# Patient Record
Sex: FEMALE | Race: BLACK OR AFRICAN AMERICAN | Employment: FULL TIME | URBAN - METROPOLITAN AREA
[De-identification: names, ages, dates, MRNs, and addresses within clinical notes are randomized per-mention and may not be internally consistent; named-entity substitution may affect disease eponyms.]

---

## 2017-02-13 DIAGNOSIS — B37.31 YEAST VAGINITIS: Primary | ICD-10-CM

## 2017-02-13 RX ORDER — FLUCONAZOLE 150 MG/1
TABLET ORAL
Qty: 1 TAB | Refills: 0 | Status: SHIPPED | OUTPATIENT
Start: 2017-02-13 | End: 2017-07-12

## 2017-02-13 RX ORDER — FLUCONAZOLE 150 MG/1
150 TABLET ORAL DAILY
Qty: 1 TAB | Refills: 5 | Status: SHIPPED | OUTPATIENT
Start: 2017-02-13 | End: 2017-02-14

## 2017-02-18 DIAGNOSIS — B37.31 YEAST VAGINITIS: Primary | ICD-10-CM

## 2017-02-18 RX ORDER — FLUCONAZOLE 150 MG/1
150 TABLET ORAL DAILY
Qty: 1 TAB | Refills: 5 | Status: SHIPPED | OUTPATIENT
Start: 2017-02-18 | End: 2017-02-19

## 2017-02-23 ENCOUNTER — OFFICE VISIT (OUTPATIENT)
Dept: OBGYN CLINIC | Age: 47
End: 2017-02-23

## 2017-02-23 VITALS
HEIGHT: 62 IN | WEIGHT: 270 LBS | HEART RATE: 74 BPM | BODY MASS INDEX: 49.69 KG/M2 | SYSTOLIC BLOOD PRESSURE: 110 MMHG | DIASTOLIC BLOOD PRESSURE: 72 MMHG

## 2017-02-23 DIAGNOSIS — N89.8 VAGINAL DISCHARGE: ICD-10-CM

## 2017-02-23 DIAGNOSIS — N92.6 MENSTRUAL CYCLE PROBLEM: Primary | ICD-10-CM

## 2017-02-23 DIAGNOSIS — N92.6 IRREGULAR MENSES: ICD-10-CM

## 2017-02-23 LAB
BILIRUB UR QL STRIP: NEGATIVE
GLUCOSE UR-MCNC: NEGATIVE MG/DL
HCG URINE, QL. (POC): NEGATIVE
KETONES P FAST UR STRIP-MCNC: NEGATIVE MG/DL
PH UR STRIP: 5.5 [PH] (ref 4.6–8)
PROT UR QL STRIP: NEGATIVE MG/DL
SP GR UR STRIP: 1.03 (ref 1–1.03)
UA UROBILINOGEN AMB POC: NORMAL (ref 0.2–1)
URINALYSIS CLARITY POC: CLEAR
URINALYSIS COLOR POC: YELLOW
URINE BLOOD POC: NORMAL
URINE LEUKOCYTES POC: NEGATIVE
URINE NITRITES POC: NEGATIVE
VALID INTERNAL CONTROL?: YES

## 2017-02-23 NOTE — PATIENT INSTRUCTIONS
Vaginal Bleeding in Nonpregnant Women: Care Instructions  Your Care Instructions    Many women have bleeding or spotting between periods. Lots of things can cause it. You may bleed because of hormone problems, stress, or ovulation. Fibroids and IUDs (intrauterine devices) can also cause bleeding. If your bleeding or spotting is caused by one of these things and is not heavy or doesn't happen often, you probably don't need to worry. But in rare cases, infection, cancer, or other serious conditions can cause bleeding. So you may need more tests to find the cause of your bleeding. The doctor has checked you carefully, but problems can develop later. If you notice any problems or new symptoms, get medical treatment right away. Follow-up care is a key part of your treatment and safety. Be sure to make and go to all appointments, and call your doctor if you are having problems. It's also a good idea to know your test results and keep a list of the medicines you take. How can you care for yourself at home? · Take pain medicines exactly as directed. ¨ If the doctor gave you a prescription medicine for pain, take it as prescribed. ¨ If you are not taking a prescription pain medicine, ask your doctor if you can take an over-the-counter medicine. Do not take aspirin, which may make bleeding worse. · If your doctor prescribed birth control pills for your bleeding, take them as directed. · Eat foods that are high in iron and vitamin C. Foods high in iron include red meat, shellfish, eggs, beans, and leafy green vegetables. Foods high in vitamin C include citrus fruits, tomatoes, and broccoli. Ask your doctor if you need to take iron pills or a multivitamin. · Ask your doctor when it is okay to have sex. When should you call for help? Call 911 anytime you think you may need emergency care. For example, call if:  · You passed out (lost consciousness). · You have sudden, severe pain in your belly or pelvis.   Call your doctor now or seek immediate medical care if:  · You have severe vaginal bleeding. You are soaking through your usual pads or tampons each hour for 2 or more hours. · You are dizzy or lightheaded or you feel like you may faint. · You have new belly or pelvic pain. · You have a fever. · Your bleeding gets worse. Watch closely for changes in your health, and be sure to contact your doctor if:  · You have new or worse vaginal discharge. · You do not get better as expected. Where can you learn more? Go to http://ivan-joshua.info/. Enter U700 in the search box to learn more about \"Vaginal Bleeding in Nonpregnant Women: Care Instructions. \"  Current as of: February 25, 2016  Content Version: 11.1  © 2925-9105 [employer]. Care instructions adapted under license by [employer] (which disclaims liability or warranty for this information). If you have questions about a medical condition or this instruction, always ask your healthcare professional. [address] any warranty or liability for your use of this information.

## 2017-02-23 NOTE — PROGRESS NOTES
SUBJ: Ms. Noam Grewal is a 51 y.o. y/o female, , who presents today for     Chief Complaint   Patient presents with    Vaginal Discharge       Her LMP was Patient's last menstrual period was 2017. Maynor Begum has questions about her menstrual cycle. LMP was ; however, this cycle was very light compared to what she is used to. Normally her cycles are moderate flow and last 5 days without menorrhagia or dysmenorrhea. She did experience cramping with her cycle; however, this was relieved with OTC analgesics. She currently has mild hot flashes. Menses comes monthly. She has questions regarding if she is \"going through the change\". Past Medical History:   Diagnosis Date    HTN (hypertension), benign 2009    HTN (hypertension), benign 2009      Past Surgical History:   Procedure Laterality Date    HX CHOLECYSTECTOMY      HX GYN          HX GYN      BTL     Family History   Problem Relation Age of Onset    Cancer Mother     Stroke Father     Hypertension Father     Cancer Brother     Diabetes Brother      Social History     Social History    Marital status: SINGLE     Spouse name: N/A    Number of children: N/A    Years of education: N/A     Social History Main Topics    Smoking status: Never Smoker    Smokeless tobacco: Never Used    Alcohol use No    Drug use: No    Sexual activity: Yes     Partners: Female     Other Topics Concern    None     Social History Narrative         PE:   Visit Vitals    Ht 5' 2\" (1.575 m)    Wt 270 lb (122.5 kg)    LMP 2017    BMI 49.38 kg/m2       Pt is a well developed female who is alert and oriented x 3. CVS exam: normal rate, regular rhythm, normal S1, S2, no murmurs, rubs, clicks or gallops.   Lungs: clear bilaterally to auscultation, no wheezing, rhonci or rales  Pelvic exam: VULVA: normal appearing vulva with no masses, tenderness or lesions, VAGINA: normal appearing vagina with normal color and discharge, no lesions, CERVIX: normal appearing cervix without discharge or lesions, cervical motion tenderness absent, UTERUS: uterus is normal size, shape, consistency and nontender, ADNEXA: normal adnexa in size, nontender and no masses. Assessment/Plan:         3.  Menstrual cycle problem  Urine preg test - neg  U/A neg  Reviewed menopause as no menses for > 1 year  Discussed perimenopause  Will monitor menstrual cycles  RTC annually and prn

## 2017-04-06 ENCOUNTER — OFFICE VISIT (OUTPATIENT)
Dept: OBGYN CLINIC | Age: 47
End: 2017-04-06

## 2017-04-06 VITALS
SYSTOLIC BLOOD PRESSURE: 113 MMHG | BODY MASS INDEX: 49.87 KG/M2 | HEART RATE: 91 BPM | HEIGHT: 62 IN | DIASTOLIC BLOOD PRESSURE: 68 MMHG | WEIGHT: 271 LBS

## 2017-04-06 DIAGNOSIS — N76.0 VAGINITIS AND VULVOVAGINITIS: Primary | ICD-10-CM

## 2017-04-06 RX ORDER — TERCONAZOLE 8 MG/G
1 CREAM VAGINAL
Qty: 20 G | Refills: 3 | Status: SHIPPED | OUTPATIENT
Start: 2017-04-06 | End: 2017-09-26

## 2017-04-06 NOTE — PROGRESS NOTES
Daily Progress Note    Patient: Alize Ocampo  MRN: [id number]  Date: 4/6/2017     Age:  51 y.o.,      Sex: female    YOB: 1970    Alize Ocampo is a 51y.o. year-old female, G 4 P 4 whose last normal menstrual period is unknown . She presents today with recurrent vulva and vaginal itching and discharge x1 week after treatments of some bacteria infection. Review of Systems: General, Cardiovascular, Respiratory, Gastrointestinal, Genitourinary, Neuropsychiatry, Musculoskeletal. Patient denies any problems associated with these systems except for the above complaints. General Examination: She is a well-developed, well-nourished female in no acute distress. Abdomen--soft, nontender, and normal active. Pelvic exam--External genitalia--dry skin with mild monilial vulvitis and BUS--normal.             Cervix: Normal    Vagina: vaginal discharge white and odorless                          Uterus: normal size, contour, position, consistency, mobility, non-tender    Adnexa: normal bimanual exam    Impression. Monilial Vulvo-vaginitis.     Plan: --Rx Terazol-3 cream.              RTC--augustus Rome MD  April 6, 2017

## 2017-04-06 NOTE — MR AVS SNAPSHOT
Visit Information Date & Time Provider Department Dept. Phone Encounter #  
 4/6/2017  2:30 PM Bernadette Traorephong [id number] Follow-up Instructions Return if symptoms worsen or fail to improve. Upcoming Health Maintenance Date Due INFLUENZA AGE 9 TO ADULT 8/1/2016 PAP AKA CERVICAL CYTOLOGY 10/28/2019 Allergies as of 4/6/2017  Review Complete On: 4/6/2017 By: Aroldo Rubi MD  
 No Known Allergies Current Immunizations  Never Reviewed No immunizations on file. Not reviewed this visit You Were Diagnosed With   
  
 Codes Comments Vaginitis and vulvovaginitis    -  Primary ICD-10-CM: N76.0 ICD-9-CM: 616.10 Vitals BP Pulse Height(growth percentile) Weight(growth percentile) BMI OB Status 113/68 (BP 1 Location: Right arm, BP Patient Position: Sitting) 91 5' 2\" (1.575 m) 271 lb (122.9 kg) 49.57 kg/m2 Having regular periods Smoking Status Never Smoker BMI and BSA Data Body Mass Index Body Surface Area  
 49.57 kg/m 2 2.32 m 2 Preferred Pharmacy Pharmacy Name Phone [facility] [address] [phone] Your Updated Medication List  
  
   
This list is accurate as of: 4/6/17  3:25 PM.  Always use your most recent med list.  
  
  
  
  
 fluconazole 150 mg tablet Commonly known as:  DIFLUCAN  
TAKE 1 TABLET BY MOUTH AS A SINGLE DOSE HYDROcodone-acetaminophen 5-325 mg per tablet Commonly known as:  Ceil Heap Take 1 Tab by mouth nightly. Max Daily Amount: 1 Tab.  
  
 ibuprofen 800 mg tablet Commonly known as:  MOTRIN Take 1 Tab by mouth every eight (8) hours as needed for Pain. Indications: DYSMENORRHEA, PAIN  
  
 lisinopril-hydroCHLOROthiazide 20-25 mg per tablet Commonly known as:  PRINZIDE, ZESTORETIC  
TAKE 1 TABLET BY MOUTH DAILY metoprolol tartrate 100 mg IR tablet Commonly known as:  LOPRESSOR Take 1 Tab by mouth daily. terconazole 0.8 % vaginal cream  
Commonly known as:  TERAZOL 3 Insert 1 Applicator into vagina nightly. Prescriptions Sent to Pharmacy Refills  
 terconazole (TERAZOL 3) 0.8 % vaginal cream 3 Sig: Insert 1 Applicator into vagina nightly. Class: Normal  
 Pharmacy: [facility] [address] [place], [place] [address] [address] [place] [address] #: [phone] Route: Vaginal  
  
Follow-up Instructions Return if symptoms worsen or fail to improve. Patient Instructions Vaginitis: Care Instructions Your Care Instructions Vaginitis is soreness or infection of the vagina. This common problem can cause itching and burning. And it can cause a change in vaginal discharge. Sometimes it can cause pain during sex. Vaginitis may be caused by bacteria, yeast, or other germs. Some infections that cause it are caught from a sexual partner. Bath products, spermicides, and douches can irritate the vagina too. Some women have this problem during and after menopause. A drop in estrogen levels during this time can cause dryness, soreness, and pain during sex. Your doctor can give you medicine to treat an infection. And home care may help you feel better. For certain types of infections, your sex partner must be treated too. Follow-up care is a key part of your treatment and safety. Be sure to make and go to all appointments, and call your doctor if you are having problems. It's also a good idea to know your test results and keep a list of the medicines you take. How can you care for yourself at home? · If your doctor prescribed antibiotics, take them as directed. Do not stop taking them just because you feel better. You need to take the full course of antibiotics. · Take your medicines exactly as prescribed.  Call your doctor if you think you are having a problem with your medicine. · Do not eat or drink anything that has alcohol if you are taking metronidazole (Flagyl). · If you have a yeast infection, use over-the-counter products as your doctor tells you to. Or take medicine your doctor prescribes exactly as directed. · Wash your vaginal area daily with water. You also can use a mild, unscented soap if you want. · Do not use scented bath products. And do not use vaginal sprays or douches. · Put a washcloth soaked in cool water on the area to relieve itching. Or you can take cool baths. · If you have dryness because of menopause, use estrogen cream or pills that your doctor prescribes. · Ask your doctor about when it is okay to have sex. · Use a personal lubricant before sex if you have dryness. Examples are Astroglide, K-Y Jelly, and Wet Lubricant Gel. · Ask your doctor if your sex partner also needs treatment. When should you call for help? Call your doctor now or seek immediate medical care if: 
· You have a fever and pelvic pain. Watch closely for changes in your health, and be sure to contact your doctor if: 
· You have bleeding other than your period. · You do not get better as expected. Where can you learn more? Go to http://ivan-joshua.info/. Enter Q034 in the search box to learn more about \"Vaginitis: Care Instructions. \" Current as of: October 13, 2016 Content Version: 11.2 © 5658-0374 [employer]. Care instructions adapted under license by [employer] (which disclaims liability or warranty for this information). If you have questions about a medical condition or this instruction, always ask your healthcare professional. [place] [address] any warranty or liability for your use of this information. Introducing [facility]!    
 [employer] introduces [employer] Mobile patient portal. Now you can access parts of your medical record, email your doctor's office, and request medication refills online. 1. In your internet browser, go to [email or web address] 2. Click on the First Time User? Click Here link in the Sign In box. You will see the New Member Sign Up page. 3. Enter your [employer] Access Code exactly as it appears below. You will not need to use this code after youve completed the sign-up process. If you do not sign up before the expiration date, you must request a new code. · [employer] Access Code: [email or web address] Expires: 7/5/2017  3:25 PM 
 
4. Enter the last four digits of your Social Security Number (xxxx) and Date of Birth (mm/dd/yyyy) as indicated and click Submit. You will be taken to the next sign-up page. 5. Create a [employer] ID. This will be your [employer] login ID and cannot be changed, so think of one that is secure and easy to remember. 6. Create a [employer] password. You can change your password at any time. 7. Enter your Password Reset Question and Answer. This can be used at a later time if you forget your password. 8. Enter your e-mail address. You will receive e-mail notification when new information is available in [address] 9. Click Sign Up. You can now view and download portions of your medical record. 10. Click the Download Summary menu link to download a portable copy of your medical information. If you have questions, please visit the Frequently Asked Questions section of the [employer] website. Remember, [employer] is NOT to be used for urgent needs. For medical emergencies, dial 911. Now available from your iPhone and Android! Please provide this summary of care documentation to your next provider. Your primary care clinician is listed as Fidelina Hoffmann. If you have any questions after today's visit, please call [phone].

## 2017-04-06 NOTE — PATIENT INSTRUCTIONS

## 2017-04-07 ENCOUNTER — HOSPITAL ENCOUNTER (EMERGENCY)
Age: 47
Discharge: LEFT BEFORE DISCHARGE | End: 2017-04-08
Attending: EMERGENCY MEDICINE
Payer: MEDICAID

## 2017-04-07 VITALS
OXYGEN SATURATION: 99 % | DIASTOLIC BLOOD PRESSURE: 86 MMHG | SYSTOLIC BLOOD PRESSURE: 134 MMHG | RESPIRATION RATE: 20 BRPM | TEMPERATURE: 98.4 F | HEART RATE: 88 BPM | BODY MASS INDEX: 49.49 KG/M2 | HEIGHT: 62 IN | WEIGHT: 268.96 LBS

## 2017-04-07 DIAGNOSIS — Z53.21 PATIENT LEFT WITHOUT BEING SEEN: Primary | ICD-10-CM

## 2017-04-07 PROCEDURE — 75810000275 HC EMERGENCY DEPT VISIT NO LEVEL OF CARE

## 2017-04-07 PROCEDURE — 99282 EMERGENCY DEPT VISIT SF MDM: CPT

## 2017-04-08 NOTE — ED TRIAGE NOTES
Pt woke up this morning with two large red itchy whelps on right arm only. Has stayed throughout the day with redness, warmth and itching.

## 2017-07-12 ENCOUNTER — HOSPITAL ENCOUNTER (EMERGENCY)
Age: 47
Discharge: HOME | End: 2017-07-12
Attending: EMERGENCY MEDICINE
Payer: MEDICAID

## 2017-07-12 VITALS
WEIGHT: 270 LBS | TEMPERATURE: 98.5 F | BODY MASS INDEX: 49.69 KG/M2 | DIASTOLIC BLOOD PRESSURE: 78 MMHG | OXYGEN SATURATION: 100 % | HEIGHT: 62 IN | RESPIRATION RATE: 18 BRPM | HEART RATE: 73 BPM | SYSTOLIC BLOOD PRESSURE: 133 MMHG

## 2017-07-12 DIAGNOSIS — N30.00 ACUTE CYSTITIS WITHOUT HEMATURIA: Primary | ICD-10-CM

## 2017-07-12 LAB
APPEARANCE UR: CLEAR
BILIRUB UR QL: NEGATIVE
COLOR UR: YELLOW
GLUCOSE UR STRIP.AUTO-MCNC: NEGATIVE MG/DL
HCG UR QL: NEGATIVE
HGB UR QL STRIP: NEGATIVE
KETONES UR QL STRIP.AUTO: NEGATIVE MG/DL
LEUKOCYTE ESTERASE UR QL STRIP.AUTO: NEGATIVE
NITRITE UR QL STRIP.AUTO: NEGATIVE
PH UR STRIP: 6 [PH] (ref 5–8)
PROT UR STRIP-MCNC: NEGATIVE MG/DL
SP GR UR REFRACTOMETRY: 1.02 (ref 1–1.03)
UROBILINOGEN UR QL STRIP.AUTO: 1 EU/DL (ref 0.2–1)

## 2017-07-12 PROCEDURE — 74011250637 HC RX REV CODE- 250/637: Performed by: EMERGENCY MEDICINE

## 2017-07-12 PROCEDURE — 81003 URINALYSIS AUTO W/O SCOPE: CPT | Performed by: EMERGENCY MEDICINE

## 2017-07-12 PROCEDURE — 81025 URINE PREGNANCY TEST: CPT | Performed by: EMERGENCY MEDICINE

## 2017-07-12 PROCEDURE — 99283 EMERGENCY DEPT VISIT LOW MDM: CPT

## 2017-07-12 RX ORDER — NITROFURANTOIN 25; 75 MG/1; MG/1
100 CAPSULE ORAL 2 TIMES DAILY
Qty: 20 CAP | Refills: 0 | Status: SHIPPED | OUTPATIENT
Start: 2017-07-12 | End: 2017-07-22

## 2017-07-12 RX ORDER — NITROFURANTOIN MACROCRYSTALS 50 MG/1
100 CAPSULE ORAL
Status: COMPLETED | OUTPATIENT
Start: 2017-07-12 | End: 2017-07-12

## 2017-07-12 RX ADMIN — NITROFURANTOIN MACROCRYSTALS 100 MG: 50 CAPSULE ORAL at 22:51

## 2017-07-13 NOTE — ED NOTES
I have reviewed discharge instructions with the patient. The patient verbalized understanding. Patient armband removed and shredded  Current Discharge Medication List      START taking these medications    Details   nitrofurantoin, macrocrystal-monohydrate, (MACROBID) 100 mg capsule Take 1 Cap by mouth two (2) times a day for 10 days.   Qty: 20 Cap, Refills: 0

## 2017-07-13 NOTE — DISCHARGE INSTRUCTIONS
Urinary Tract Infection in Women: Care Instructions  Your Care Instructions    A urinary tract infection, or UTI, is a general term for an infection anywhere between the kidneys and the urethra (where urine comes out). Most UTIs are bladder infections. They often cause pain or burning when you urinate. UTIs are caused by bacteria and can be cured with antibiotics. Be sure to complete your treatment so that the infection goes away. Follow-up care is a key part of your treatment and safety. Be sure to make and go to all appointments, and call your doctor if you are having problems. It's also a good idea to know your test results and keep a list of the medicines you take. How can you care for yourself at home? · Take your antibiotics as directed. Do not stop taking them just because you feel better. You need to take the full course of antibiotics. · Drink extra water and other fluids for the next day or two. This may help wash out the bacteria that are causing the infection. (If you have kidney, heart, or liver disease and have to limit fluids, talk with your doctor before you increase your fluid intake.)  · Avoid drinks that are carbonated or have caffeine. They can irritate the bladder. · Urinate often. Try to empty your bladder each time. · To relieve pain, take a hot bath or lay a heating pad set on low over your lower belly or genital area. Never go to sleep with a heating pad in place. To prevent UTIs  · Drink plenty of water each day. This helps you urinate often, which clears bacteria from your system. (If you have kidney, heart, or liver disease and have to limit fluids, talk with your doctor before you increase your fluid intake.)  · Urinate when you need to. · Urinate right after you have sex. · Change sanitary pads often. · Avoid douches, bubble baths, feminine hygiene sprays, and other feminine hygiene products that have deodorants.   · After going to the bathroom, wipe from front to back.  When should you call for help? Call your doctor now or seek immediate medical care if:  · Symptoms such as fever, chills, nausea, or vomiting get worse or appear for the first time. · You have new pain in your back just below your rib cage. This is called flank pain. · There is new blood or pus in your urine. · You have any problems with your antibiotic medicine. Watch closely for changes in your health, and be sure to contact your doctor if:  · You are not getting better after taking an antibiotic for 2 days. · Your symptoms go away but then come back. Where can you learn more? Go to http://ivan-joshua.info/. Enter L208 in the search box to learn more about \"Urinary Tract Infection in Women: Care Instructions. \"  Current as of: November 28, 2016  Content Version: 11.3  © 5835-2481 [employer], [employer]. Care instructions adapted under license by [employer] (which disclaims liability or warranty for this information). If you have questions about a medical condition or this instruction, always ask your healthcare professional. [address] any warranty or liability for your use of this information.

## 2017-07-13 NOTE — ED PROVIDER NOTES
HPI Comments: 10:15 PM Padmaja Osei is a 51 y.o. female with a history of HTN who presents to the emergency department c/o urinary frequency and urgency since earlier today. Patient also c/o lower abdominal pain and fatigue as associated symptoms. Patient notes a hx of UTI. She denies any fever or back pain. The patient reports no other symptoms and has no other concerns at this time. PCP: Pranav Dove MD      The history is provided by the patient. Past Medical History:   Diagnosis Date    HTN (hypertension), benign 2009    HTN (hypertension), benign 2009       Past Surgical History:   Procedure Laterality Date    HX CHOLECYSTECTOMY      HX GYN  2003        HX GYN  2005    BTL         Family History:   Problem Relation Age of Onset    Cancer Mother     Stroke Father     Hypertension Father     Cancer Brother     Diabetes Brother        Social History     Social History    Marital status: SINGLE     Spouse name: N/A    Number of children: N/A    Years of education: N/A     Occupational History    Not on file. Social History Main Topics    Smoking status: Never Smoker    Smokeless tobacco: Never Used    Alcohol use No    Drug use: No    Sexual activity: Yes     Partners: Female     Other Topics Concern    Not on file     Social History Narrative         ALLERGIES: Review of patient's allergies indicates no known allergies. Review of Systems   Constitutional: Positive for fatigue. Negative for diaphoresis and fever. HENT: Negative for ear pain, rhinorrhea and trouble swallowing. Eyes: Negative for visual disturbance. Respiratory: Negative for cough and shortness of breath. Cardiovascular: Negative for chest pain and leg swelling. Gastrointestinal: Positive for abdominal pain (lower). Negative for blood in stool, diarrhea, nausea and vomiting. Genitourinary: Positive for frequency and urgency.  Negative for difficulty urinating, flank pain and hematuria. Musculoskeletal: Negative for back pain and neck pain. Skin: Negative for rash. Neurological: Negative for dizziness, weakness, numbness and headaches. Hematological: Negative. Psychiatric/Behavioral: Negative. All other systems reviewed and are negative. Vitals:    07/12/17 2210   BP: 133/78   Pulse: 73   Resp: 18   Temp: 98.5 °F (36.9 °C)   SpO2: 100%   Weight: 122.5 kg (270 lb)   Height: 5' 2\" (1.575 m)    100 %. Percentage is within normal limits. Physical Exam   Constitutional: She is oriented to person, place, and time. She appears well-developed and well-nourished. HENT:   Head: Normocephalic and atraumatic. Mouth/Throat: Oropharynx is clear and moist.   Eyes: Conjunctivae are normal. Pupils are equal, round, and reactive to light. No scleral icterus. Neck: Normal range of motion. Neck supple. No JVD present. No tracheal deviation present. Cardiovascular: Normal rate, regular rhythm and normal heart sounds. Pulmonary/Chest: Effort normal and breath sounds normal. No respiratory distress. She has no wheezes. Abdominal: Soft. Bowel sounds are normal.   Musculoskeletal: Normal range of motion. Neurological: She is alert and oriented to person, place, and time. She has normal strength. Gait normal. GCS eye subscore is 4. GCS verbal subscore is 5. GCS motor subscore is 6. Skin: Skin is warm and dry. Psychiatric: She has a normal mood and affect. Nursing note and vitals reviewed. MDM  Number of Diagnoses or Management Options  Acute cystitis without hematuria:   Diagnosis management comments: Will treat pt clinically. Says this feels like early UTI. Garcia Mohr MD         Amount and/or Complexity of Data Reviewed  Clinical lab tests: ordered and reviewed      ED Course       Procedures  Vitals:  Patient Vitals for the past 12 hrs:   Temp Pulse Resp BP SpO2   07/12/17 2210 98.5 °F (36.9 °C) 73 18 133/78 100 %   100 %.  Percentage is within normal limits. Medications ordered:   Medications - No data to display      Lab findings:  No results found for this or any previous visit (from the past 12 hour(s)). EKG interpretation by ED Physician:      X-Ray, CT or other radiology findings or impressions:  No orders to display       Progress notes, Consult notes or additional Procedure notes:   I have reevaluated patient. Patient is feeling better. Discussed lab results with the patient. She agrees with plan for discharge. All concerns have been addressed. Disposition:  Diagnosis: No diagnosis found. Disposition: Discharged    Follow-up Information     None           Patient's Medications   Start Taking    No medications on file   Continue Taking    LISINOPRIL-HYDROCHLOROTHIAZIDE (PRINZIDE, ZESTORETIC) 20-25 MG PER TABLET    TAKE 1 TABLET BY MOUTH DAILY    METOPROLOL (LOPRESSOR) 100 MG TABLET    Take 1 Tab by mouth daily. TERCONAZOLE (TERAZOL 3) 0.8 % VAGINAL CREAM    Insert 1 Applicator into vagina nightly. These Medications have changed    No medications on file   Stop Taking    FLUCONAZOLE (DIFLUCAN) 150 MG TABLET    TAKE 1 TABLET BY MOUTH AS A SINGLE DOSE    HYDROCODONE-ACETAMINOPHEN (NORCO) 5-325 MG PER TABLET    Take 1 Tab by mouth nightly. Max Daily Amount: 1 Tab. IBUPROFEN (MOTRIN) 800 MG TABLET    Take 1 Tab by mouth every eight (8) hours as needed for Pain. Indications: DYSMENORRHEA, PAIN     Scribe Attestation:     I, Abran Yost, scribing for and in the presence of Mahogany Wilcox MD July 12, 2017 at 10:24 PM     Signed by: Pepe Clay, July 12, 2017, 10:15 PM    Physician Attestation:   I personally performed the services described in this documentation, reviewed and edited the documentation which was dictated to the scribe in my presence, and it accurately records my words and actions.  Mahogany Wilcox MD  July 12, 2017 at 10:24 PM

## 2017-09-26 ENCOUNTER — HOSPITAL ENCOUNTER (EMERGENCY)
Age: 47
Discharge: HOME | End: 2017-09-26
Attending: EMERGENCY MEDICINE
Payer: MEDICAID

## 2017-09-26 VITALS
OXYGEN SATURATION: 98 % | SYSTOLIC BLOOD PRESSURE: 134 MMHG | TEMPERATURE: 98.3 F | BODY MASS INDEX: 53.01 KG/M2 | HEIGHT: 60 IN | DIASTOLIC BLOOD PRESSURE: 88 MMHG | RESPIRATION RATE: 18 BRPM | HEART RATE: 72 BPM | WEIGHT: 270 LBS
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
## 2017-09-26 PROCEDURE — 99282 EMERGENCY DEPT VISIT SF MDM: CPT

## 2017-09-26 RX ORDER — DIPHENHYDRAMINE HCL 25 MG
CAPSULE ORAL
Qty: 16 CAP | Refills: 0 | Status: SHIPPED | OUTPATIENT
Start: 2017-09-26 | End: 2018-04-03

## 2017-09-26 RX ORDER — METHYLPREDNISOLONE 4 MG/1
TABLET ORAL
Qty: 1 DOSE PACK | Refills: 0 | Status: SHIPPED | OUTPATIENT
Start: 2017-09-26 | End: 2018-04-03

## 2017-09-26 NOTE — ED TRIAGE NOTES
Patient states hx of allergic reactions to unknown substances in past.  She states rash to arms, chest and back since yesterday. States itching to affected areas.

## 2017-09-26 NOTE — ED PROVIDER NOTES
HPI Comments: 3:36 PM  51 y.o. female who presents to ED C/O multiple bites and erythema of upper extremities and back x 2 days. Notes rash is pruritic. Was outside all weekend, so may have been bitten by something. Denies fever/chills, throat swelling, dyspnea,family members with similar symptoms. Has not tried any medication for symptoms. No new lotions/detergents/foods. No hx of allergies  Pt denies any other sxs or complaints. Written by Dwayne Gallegos PA-C      The history is provided by the patient. Past Medical History:   Diagnosis Date    Acute cystitis with hematuria     Heel spur     HTN (hypertension), benign 2009    Irregular menses     Menorrhagia     Plantar fasciitis     Shoulder bursitis     Urticaria     Vulvovaginitis        Past Surgical History:   Procedure Laterality Date    HX CHOLECYSTECTOMY      HX GYN          HX GYN  2005    BTL         Family History:   Problem Relation Age of Onset    Cancer Mother     Stroke Father     Hypertension Father     Cancer Brother     Diabetes Brother        Social History     Social History    Marital status: SINGLE     Spouse name: N/A    Number of children: N/A    Years of education: N/A     Occupational History    Not on file. Social History Main Topics    Smoking status: Never Smoker    Smokeless tobacco: Never Used    Alcohol use No    Drug use: No    Sexual activity: Yes     Partners: Female     Other Topics Concern    Not on file     Social History Narrative         ALLERGIES: Review of patient's allergies indicates no known allergies. Review of Systems   Constitutional: Negative for chills and fever. HENT: Negative for trouble swallowing. Respiratory: Negative for shortness of breath. Cardiovascular: Negative for chest pain. Gastrointestinal: Negative for abdominal pain, nausea and vomiting. Musculoskeletal: Negative for joint swelling. Skin: Positive for rash.  Negative for color change, pallor and wound. Neurological: Negative for weakness. Hematological: Does not bruise/bleed easily. All other systems reviewed and are negative. Vitals:    09/26/17 1538   BP: 134/88   Pulse: 72   Resp: 18   Temp: 98.3 °F (36.8 °C)   SpO2: 98%   Weight: 122.5 kg (270 lb)   Height: 5' (1.524 m)            Physical Exam   Constitutional: She appears well-developed and well-nourished. No distress. HENT:   Head: Normocephalic and atraumatic. Mouth/Throat: Oropharynx is clear and moist.   Neck: Normal range of motion. Neck supple. Cardiovascular: Normal rate, regular rhythm and normal heart sounds. Exam reveals no gallop and no friction rub. No murmur heard. Pulmonary/Chest: Effort normal and breath sounds normal. No respiratory distress. She has no wheezes. She has no rales. Neurological: She is alert. Skin: Skin is warm and dry. Rash noted. Rash is papular (multiple raised erythemnatous areas RUE and R upper back). She is not diaphoretic. Joints without erythema or warmth, no streaking into extremity, no palpable or visualized abscess, small areas of excoriations   Nursing note and vitals reviewed. [facility]  ED Course       Procedures      RESULTS:    No orders to display       Labs Reviewed - No data to display    No results found for this or any previous visit (from the past 12 hour(s)). IMPRESSION AND MEDICAL DECISION MAKING:  Pt with likely reaction to bug bites. No s/s of cellulitis. Not consistent with scabies or bed bugs. Stable for d/c with outpatient follow-up. CONDITION ON DISCHARGE:  stable    DISCHARGE NOTE:  3:54 PM    Judy Boothe  results have been reviewed with her. She has been counseled regarding her diagnosis, treatment, and plan. She verbally conveys understanding and agreement of the signs, symptoms, diagnosis, treatment and prognosis and additionally agrees to follow up as discussed.   She also agrees with the care-plan and conveys that all of her questions have been answered. I have also provided discharge instructions for her that include: educational information regarding their diagnosis and treatment, and list of reasons why they would want to return to the ED prior to their follow-up appointment, should her condition change. CLINICAL IMPRESSION:    1. Bug bite, initial encounter        AFTER VISIT PLAN:    Current Discharge Medication List      START taking these medications    Details   methylPREDNISolone (MEDROL, AGUILA,) 4 mg tablet As prescribed  Qty: 1 Dose Pack, Refills: 0      diphenhydrAMINE (BENADRYL) 25 mg capsule Take 1-2 tabs every 6 hours as needed.   Qty: 16 Cap, Refills: 0              Follow-up Information     Follow up With Details Comments Contact Info    [facility] EMERGENCY DEPT  If symptoms worsen [id number]    Cl Rico MD In 2 days  [address] [place] [address]  [phone]             Written by Jennifer Perez PA-C

## 2017-11-02 ENCOUNTER — OFFICE VISIT (OUTPATIENT)
Dept: OBGYN CLINIC | Age: 47
End: 2017-11-02

## 2017-11-02 VITALS
DIASTOLIC BLOOD PRESSURE: 66 MMHG | HEART RATE: 74 BPM | BODY MASS INDEX: 53.01 KG/M2 | HEIGHT: 60 IN | WEIGHT: 270 LBS | SYSTOLIC BLOOD PRESSURE: 118 MMHG

## 2017-11-02 DIAGNOSIS — B96.89 BACTERIAL VAGINOSIS: Primary | ICD-10-CM

## 2017-11-02 DIAGNOSIS — N76.0 BACTERIAL VAGINOSIS: Primary | ICD-10-CM

## 2017-11-02 LAB — WET MOUNT POCT, WMPOCT: NORMAL

## 2017-11-02 RX ORDER — METRONIDAZOLE 500 MG/1
500 TABLET ORAL 2 TIMES DAILY WITH MEALS
Qty: 14 TAB | Refills: 0 | Status: SHIPPED | OUTPATIENT
Start: 2017-11-02 | End: 2017-11-09

## 2017-11-02 NOTE — PROGRESS NOTES
SUBJ: Ms. Marshall Pearson is a 51 y.o. y/o female, , who presents today for     Chief Complaint   Patient presents with    Vaginal Discharge       Her LMP was Patient's last menstrual period was 10/09/2017. Ms. Valentina Olson c/o vaginal discharge. Jhony Cower colored, \"watery\". Began 5 days ago, not present today. Noticed after changing soaps used to wash vulva. She denies vaginal itching, odor or changes to sexual partner. Denies other complaints such as dysuria, pelvic pain or abnormal bleeding. Past Medical History:   Diagnosis Date    Acute cystitis with hematuria     Heel spur     HTN (hypertension), benign 2009    Irregular menses     Menorrhagia     Plantar fasciitis     Shoulder bursitis     Urticaria     Vulvovaginitis       Past Surgical History:   Procedure Laterality Date    HX CHOLECYSTECTOMY      HX GYN  2003        HX GYN  2005    BTL     Family History   Problem Relation Age of Onset    Cancer Mother     Stroke Father     Hypertension Father     Cancer Brother     Diabetes Brother      Social History     Social History    Marital status: SINGLE     Spouse name: N/A    Number of children: N/A    Years of education: N/A     Social History Main Topics    Smoking status: Never Smoker    Smokeless tobacco: Never Used    Alcohol use No    Drug use: No    Sexual activity: Yes     Partners: Female     Other Topics Concern    None     Social History Narrative         PE:   Visit Vitals    /66 (BP 1 Location: Right arm, BP Patient Position: Sitting)    Pulse 74    Ht 5' (1.524 m)    Wt 270 lb (122.5 kg)    LMP 10/09/2017    BMI 52.73 kg/m2       Pt is a well developed female who is alert and oriented x 3. CVS exam: normal rate, regular rhythm, normal S1, S2, no murmurs, rubs, clicks or gallops.   Lungs: clear bilaterally to auscultation, no wheezing, rhonci or rales  Pelvic exam: VULVA: normal appearing vulva with no masses, tenderness or lesions, VAGINA: normal appearing vagina with normal color and discharge, no lesions, CERVIX: normal appearing cervix without discharge or lesions, cervical motion tenderness absent, UTERUS: uterus is normal size, shape, consistency and nontender, ADNEXA: normal adnexa in size, nontender and no masses, WET MOUNT done - results: clue cells. Assessment/Plan:       1. Bacterial vaginosis  Reviewed BV precautions  - AMB POC WET PREP (AKA STAIN, INTERPRET, WET MOUNT)  - metroNIDAZOLE (FLAGYL) 500 mg tablet; Take 1 Tab by mouth two (2) times daily (with meals) for 7 days. Dispense: 14 Tab;  Refill: 0    RTC annually or prn

## 2017-11-02 NOTE — PATIENT INSTRUCTIONS
Bacterial Vaginosis: Care Instructions  Your Care Instructions    Bacterial vaginosis is a type of vaginal infection. It is caused by excess growth of certain bacteria that are normally found in the vagina. Symptoms can include itching, swelling, pain when you urinate or have sex, and a gray or yellow discharge with a \"fishy\" odor. It is not considered an infection that is spread through sexual contact. Although symptoms can be annoying and uncomfortable, bacterial vaginosis does not usually cause other health problems. However, if you have it while you are pregnant, it can cause complications. While the infection may go away on its own, most doctors use antibiotics to treat it. You may have been prescribed pills or vaginal cream. With treatment, bacterial vaginosis usually clears up in 5 to 7 days. Follow-up care is a key part of your treatment and safety. Be sure to make and go to all appointments, and call your doctor if you are having problems. It's also a good idea to know your test results and keep a list of the medicines you take. How can you care for yourself at home? · Take your antibiotics as directed. Do not stop taking them just because you feel better. You need to take the full course of antibiotics. · Do not eat or drink anything that contains alcohol if you are taking metronidazole (Flagyl). · Keep using your medicine if you start your period. Use pads instead of tampons while using a vaginal cream or suppository. Tampons can absorb the medicine. · Wear loose cotton clothing. Do not wear nylon and other materials that hold body heat and moisture close to the skin. · Do not scratch. Relieve itching with a cold pack or a cool bath. · Do not wash your vaginal area more than once a day. Use plain water or a mild, unscented soap. Do not douche. When should you call for help?   Watch closely for changes in your health, and be sure to contact your doctor if:  ? · You have unexpected vaginal bleeding. ? · You have a fever. ? · You have new or increased pain in your vagina or pelvis. ? · You are not getting better after 1 week. ? · Your symptoms return after you finish the course of your medicine. Where can you learn more? Go to http://iavn-joshua.info/. Sharla Slade in the search box to learn more about \"Bacterial Vaginosis: Care Instructions. \"  Current as of: October 13, 2016  Content Version: 11.4  © 6368-3016 [employer]. Care instructions adapted under license by [employer] (which disclaims liability or warranty for this information). If you have questions about a medical condition or this instruction, always ask your healthcare professional. [address] any warranty or liability for your use of this information.

## 2017-12-20 DIAGNOSIS — N94.6 DYSMENORRHEA: ICD-10-CM

## 2018-01-01 RX ORDER — IBUPROFEN 800 MG/1
TABLET ORAL
Qty: 40 TAB | Refills: 0 | Status: SHIPPED | OUTPATIENT
Start: 2018-01-01 | End: 2018-09-10 | Stop reason: SDUPTHER

## 2018-03-01 ENCOUNTER — OFFICE VISIT (OUTPATIENT)
Dept: FAMILY MEDICINE CLINIC | Age: 48
End: 2018-03-01

## 2018-03-01 VITALS
OXYGEN SATURATION: 97 % | WEIGHT: 273.6 LBS | SYSTOLIC BLOOD PRESSURE: 102 MMHG | HEART RATE: 80 BPM | HEIGHT: 61 IN | BODY MASS INDEX: 51.65 KG/M2 | TEMPERATURE: 98.3 F | DIASTOLIC BLOOD PRESSURE: 70 MMHG | RESPIRATION RATE: 16 BRPM

## 2018-03-01 DIAGNOSIS — B37.2 CANDIDAL DERMATITIS: ICD-10-CM

## 2018-03-01 DIAGNOSIS — J06.9 UPPER RESPIRATORY TRACT INFECTION, UNSPECIFIED TYPE: ICD-10-CM

## 2018-03-01 DIAGNOSIS — J02.0 STREP THROAT: ICD-10-CM

## 2018-03-01 DIAGNOSIS — E55.9 VITAMIN D DEFICIENCY: ICD-10-CM

## 2018-03-01 DIAGNOSIS — E66.01 OBESITY, MORBID (HCC): ICD-10-CM

## 2018-03-01 DIAGNOSIS — Z12.39 SCREENING FOR BREAST CANCER: ICD-10-CM

## 2018-03-01 DIAGNOSIS — I10 ESSENTIAL HYPERTENSION: Primary | ICD-10-CM

## 2018-03-01 LAB
S PYO AG THROAT QL: POSITIVE
VALID INTERNAL CONTROL?: YES

## 2018-03-01 RX ORDER — HYDROCODONE POLISTIREX AND CHLORPHENIRAMINE POLISTIREX 10; 8 MG/5ML; MG/5ML
5 SUSPENSION, EXTENDED RELEASE ORAL
Qty: 115 ML | Refills: 0 | Status: SHIPPED | OUTPATIENT
Start: 2018-03-01 | End: 2018-04-03

## 2018-03-01 RX ORDER — METOPROLOL TARTRATE 100 MG/1
100 TABLET ORAL DAILY
Qty: 90 TAB | Refills: 1 | Status: SHIPPED | OUTPATIENT
Start: 2018-03-01 | End: 2018-10-22 | Stop reason: SDUPTHER

## 2018-03-01 RX ORDER — NYSTATIN 100000 [USP'U]/G
POWDER TOPICAL
Qty: 1 BOTTLE | Refills: 2 | Status: SHIPPED | OUTPATIENT
Start: 2018-03-01 | End: 2018-04-03

## 2018-03-01 RX ORDER — LISINOPRIL AND HYDROCHLOROTHIAZIDE 20; 25 MG/1; MG/1
TABLET ORAL
Qty: 90 TAB | Refills: 1 | Status: SHIPPED | OUTPATIENT
Start: 2018-03-01 | End: 2018-10-29 | Stop reason: SDUPTHER

## 2018-03-01 RX ORDER — AMOXICILLIN 500 MG/1
TABLET, FILM COATED ORAL
Qty: 20 TAB | Refills: 0 | Status: SHIPPED | OUTPATIENT
Start: 2018-03-01 | End: 2018-04-03

## 2018-03-01 NOTE — MR AVS SNAPSHOT
[address] [facility] 250 200 [facility] 
[phone] Patient: Mingo Jacinto MRN: [id number] :1970 Visit Information Date & Time Provider Department Dept. Phone Encounter #  
 3/1/2018  3:30 PM Debbi Byoce, 933 [facility] [id number] Follow-up Instructions Return in about 6 months (around 2018) for routine care. Upcoming Health Maintenance Date Due DTaP/Tdap/Td series (1 - Tdap) 1990 PAP AKA CERVICAL CYTOLOGY 10/28/2019 Allergies as of 3/1/2018  Review Complete On: 3/1/2018 By: CHRISSY Bates No Known Allergies Current Immunizations  Never Reviewed Name Date Influenza Vaccine 2017 Not reviewed this visit You Were Diagnosed With   
  
 Codes Comments Essential hypertension    -  Primary ICD-10-CM: I10 
ICD-9-CM: 401.9 [facility]     ICD-10-CM: E66.01 
ICD-9-CM: 278.01 Strep throat     ICD-10-CM: J02.0 ICD-9-CM: 034.0 Upper respiratory tract infection, unspecified type     ICD-10-CM: J06.9 ICD-9-CM: 465.9 Candidal dermatitis     ICD-10-CM: B37.2 ICD-9-CM: 112.3 Screening for breast cancer     ICD-10-CM: Z12.31 
ICD-9-CM: V76.10 Vitamin D deficiency     ICD-10-CM: E55.9 ICD-9-CM: 268.9 Vitals BP Pulse Temp Resp Height(growth percentile) Weight(growth percentile) 102/70 (BP 1 Location: Left arm, BP Patient Position: Sitting) 80 98.3 °F (36.8 °C) (Oral) 16 5' 1\" (1.549 m) 273 lb 9.6 oz (124.1 kg) LMP SpO2 BMI OB Status Smoking Status 2017 97% 51.7 kg/m2 Unknown Never Smoker Vitals History BMI and BSA Data Body Mass Index Body Surface Area 51.7 kg/m 2 2.31 m 2 Preferred Pharmacy Pharmacy Name Phone [facility] 16 214 [facility] [phone] Your Updated Medication List  
  
   
This list is accurate as of 3/1/18  4:25 PM.  Always use your most recent med list.  
  
  
  
  
 amoxicillin 500 mg Tab Take 1 tab PO BID x 10 days  
  
 chlorpheniramine-HYDROcodone 10-8 mg/5 mL suspension Commonly known as:  Rhesa Crigler ER Take 5 mL by mouth every twelve (12) hours as needed for Cough. Max Daily Amount: 10 mL. CORICIDIN HBP PO Take  by mouth. diphenhydrAMINE 25 mg capsule Commonly known as:  BENADRYL Take 1-2 tabs every 6 hours as needed. ibuprofen 800 mg tablet Commonly known as:  MOTRIN  
TAKE 1 TABLET BY MOUTH EVERY 8 HOURS AS NEEDED FOR PAIN  
  
 lisinopril-hydroCHLOROthiazide 20-25 mg per tablet Commonly known as:  PRINZIDE, ZESTORETIC  
TAKE 1 TABLET BY MOUTH DAILY  
  
 methylPREDNISolone 4 mg tablet Commonly known as:  MEDROL (AGUILA) As prescribed  
  
 metoprolol tartrate 100 mg IR tablet Commonly known as:  LOPRESSOR Take 1 Tab by mouth daily. nystatin powder Commonly known as:  MYCOSTATIN Apply  to affected area two (2) times daily as needed. Prescriptions Printed Refills  
 chlorpheniramine-HYDROcodone (TUSSIONEX PENNKINETIC ER) 10-8 mg/5 mL suspension 0 Sig: Take 5 mL by mouth every twelve (12) hours as needed for Cough. Max Daily Amount: 10 mL. Class: Print Route: Oral  
  
Prescriptions Sent to Pharmacy Refills  
 lisinopril-hydroCHLOROthiazide (PRINZIDE, ZESTORETIC) 20-25 mg per tablet 1 Sig: TAKE 1 TABLET BY MOUTH DAILY Class: Normal  
 Pharmacy: [email or web address] [address] [place] [address] Ph #: [phone]  
 metoprolol tartrate (LOPRESSOR) 100 mg IR tablet 1 Sig: Take 1 Tab by mouth daily. Class: Normal  
 Pharmacy: [email or web address] [address] [place], [place] [address] [address] [place] [address] Ph #: [phone]  Route: Oral  
 nystatin (MYCOSTATIN) powder 2  
 Sig: Apply  to affected area two (2) times daily as needed. Class: Normal  
 Pharmacy: [facility] [address] [place], [place] [address] [address] [facility] Ph #: [phone] Route: Topical  
 amoxicillin 500 mg tab 0 Sig: Take 1 tab PO BID x 10 days Class: Normal  
 Pharmacy: [facility] [address] [place], [place] [address] [address] [facility] Ph #: [phone] We Performed the Following AMB POC RAPID STREP A [66650 CPT(R)] METABOLIC PANEL, COMPREHENSIVE [86293 CPT(R)] VITAMIN D, 25 HYDROXY [id number] CPT(R)] Follow-up Instructions Return in about 6 months (around 9/1/2018) for routine care. To-Do List   
 03/01/2018 Lab:  HEMOGLOBIN A1C W/O EAG Around 03/02/2018 Lab:  CBC WITH AUTOMATED DIFF Around 03/02/2018 Lab:  LIPID PANEL Around 03/02/2018 Lab:  TSH 3RD GENERATION   
  
 03/21/2018 Imaging:  [facility] BI SCREENING INCL CAD Patient Instructions A Healthy Lifestyle: Care Instructions Your Care Instructions A healthy lifestyle can help you feel good, stay at a healthy weight, and have plenty of energy for both work and play. A healthy lifestyle is something you can share with your whole family. A healthy lifestyle also can lower your risk for serious health problems, such as high blood pressure, heart disease, and diabetes. You can follow a few steps listed below to improve your health and the health of your family. Follow-up care is a key part of your treatment and safety. Be sure to make and go to all appointments, and call your doctor if you are having problems. It's also a good idea to know your test results and keep a list of the medicines you take. How can you care for yourself at home? · Do not eat too much sugar, fat, or fast foods. You can still have dessert and treats now and then. The goal is moderation. · Start small to improve your eating habits. Pay attention to portion sizes, drink less juice and soda pop, and eat more fruits and vegetables. ¨ Eat a healthy amount of food. A 3-ounce serving of meat, for example, is about the size of a deck of cards. Fill the rest of your plate with vegetables and whole grains. ¨ Limit the amount of soda and sports drinks you have every day. Drink more water when you are thirsty. ¨ Eat at least 5 servings of fruits and vegetables every day. It may seem like a lot, but it is not hard to reach this goal. A serving or helping is 1 piece of fruit, 1 cup of vegetables, or 2 cups of leafy, raw vegetables. Have an apple or some carrot sticks as an afternoon snack instead of a candy bar. Try to have fruits and/or vegetables at every meal. 
· Make exercise part of your daily routine. You may want to start with simple activities, such as walking, bicycling, or slow swimming. Try to be active 30 to 60 minutes every day. You do not need to do all 30 to 60 minutes all at once. For example, you can exercise 3 times a day for 10 or 20 minutes. Moderate exercise is safe for most people, but it is always a good idea to talk to your doctor before starting an exercise program. 
· Keep moving. Juan Carlos Hoop the lawn, work in the garden, or [employer]. Take the stairs instead of the elevator at work. · If you smoke, quit. People who smoke have an increased risk for heart attack, stroke, cancer, and other lung illnesses. Quitting is hard, but there are ways to boost your chance of quitting tobacco for good. ¨ Use nicotine gum, patches, or lozenges. ¨ Ask your doctor about stop-smoking programs and medicines. ¨ Keep trying. In addition to reducing your risk of diseases in the future, you will notice some benefits soon after you stop using tobacco. If you have shortness of breath or asthma symptoms, they will likely get better within a few weeks after you quit. · Limit how much alcohol you drink. Moderate amounts of alcohol (up to 2 drinks a day for men, 1 drink a day for women) are okay. But drinking too much can lead to liver problems, high blood pressure, and other health problems. Family health If you have a family, there are many things you can do together to improve your health. · Eat meals together as a family as often as possible. · Eat healthy foods. This includes fruits, vegetables, lean meats and dairy, and whole grains. · Include your family in your fitness plan. Most people think of activities such as jogging or tennis as the way to fitness, but there are many ways you and your family can be more active. Anything that makes you breathe hard and gets your heart pumping is exercise. Here are some tips: 
¨ Walk to do errands or to take your child to school or the bus. ¨ Go for a family bike ride after dinner instead of watching TV. Where can you learn more? Go to http://ivan-joshua.info/. Enter R850 in the search box to learn more about \"A Healthy Lifestyle: Care Instructions. \" Current as of: May 12, 2017 Content Version: 11.4 © 2919-0636 [employer]. Care instructions adapted under license by [employer] (which disclaims liability or warranty for this information). If you have questions about a medical condition or this instruction, always ask your healthcare professional. [place] [address] any warranty or liability for your use of this information. Strep Throat: Care Instructions Your Care Instructions Strep throat is a bacterial infection that causes sudden, severe sore throat and fever. Strep throat, which is caused by bacteria called streptococcus, is treated with antibiotics. Sometimes a strep test is necessary to tell if the sore throat is caused by strep bacteria. Treatment can help ease symptoms and may prevent future problems. Follow-up care is a key part of your treatment and safety. Be sure to make and go to all appointments, and call your doctor if you are having problems. It's also a good idea to know your test results and keep a list of the medicines you take. How can you care for yourself at home? · Take your antibiotics as directed. Do not stop taking them just because you feel better. You need to take the full course of antibiotics. · Strep throat can spread to others until 24 hours after you begin taking antibiotics. During this time, you should avoid contact with other people at work or home, especially infants and children. Do not sneeze or cough on others, and wash your hands often. Keep your drinking glass and eating utensils separate from those of others, and wash these items well in hot, soapy water. · Gargle with warm salt water at least once each hour to help reduce swelling and make your throat feel better. Use 1 teaspoon of salt mixed in 8 fluid ounces of warm water. · Take an over-the-counter pain medication, such as acetaminophen (Tylenol), ibuprofen (Advil, Motrin), or naproxen (Aleve). Read and follow all instructions on the label. · Try an over-the-counter anesthetic throat spray or throat lozenges, which may help relieve throat pain. · Drink plenty of fluids. Fluids may help soothe an irritated throat. Hot fluids, such as tea or soup, may help your throat feel better. · Eat soft solids and drink plenty of clear liquids. Flavored ice pops, ice cream, scrambled eggs, sherbet, and gelatin dessert (such as Jell-O) may also soothe the throat. · Get lots of rest. 
· Do not smoke, and avoid secondhand smoke. If you need help quitting, talk to your doctor about stop-smoking programs and medicines. These can increase your chances of quitting for good. · Use a vaporizer or humidifier to add moisture to the air in your bedroom. Follow the directions for cleaning the machine. When should you call for help? Call your doctor now or seek immediate medical care if: 
? · You have a new or higher fever. ? · You have a fever with a stiff neck or severe headache. ? · You have new or worse trouble swallowing. ? · Your sore throat gets much worse on one side. ? · Your pain becomes much worse on one side of your throat. ? Watch closely for changes in your health, and be sure to contact your doctor if: 
? · You are not getting better after 2 days (48 hours). ? · You do not get better as expected. Where can you learn more? Go to http://ivan-joshua.info/. Enter K625 in the search box to learn more about \"Strep Throat: Care Instructions. \" Current as of: May 12, 2017 Content Version: 11.4 © 3361-6594 [employer]. Care instructions adapted under license by [employer] (which disclaims liability or warranty for this information). If you have questions about a medical condition or this instruction, always ask your healthcare professional. [address] any warranty or liability for your use of this information. Introducing [facility]! Lizy Dobbins introduces [employer] patient portal. Now you can access parts of your medical record, email your doctor's office, and request medication refills online. 1. In your internet browser, go to [email or web address] 2. Click on the First Time User? Click Here link in the Sign In box. You will see the New Member Sign Up page. 3. Enter your [employer] Access Code exactly as it appears below. You will not need to use this code after youve completed the sign-up process. If you do not sign up before the expiration date, you must request a new code. · [employer] Access Code: [email or web address] Expires: 5/30/2018  3:45 PM 
 
4. Enter the last four digits of your Social Security Number (xxxx) and Date of Birth (mm/dd/yyyy) as indicated and click Submit.  You will be taken to the next sign-up page. 5. Create a [employer] ID. This will be your [employer] login ID and cannot be changed, so think of one that is secure and easy to remember. 6. Create a [employer] password. You can change your password at any time. 7. Enter your Password Reset Question and Answer. This can be used at a later time if you forget your password. 8. Enter your e-mail address. You will receive e-mail notification when new information is available in [address]. 9. Click Sign Up. You can now view and download portions of your medical record. 10. Click the Download Summary menu link to download a portable copy of your medical information. If you have questions, please visit the Frequently Asked Questions section of the [employer] website. Remember, [employer] is NOT to be used for urgent needs. For medical emergencies, dial 911. Now available from your iPhone and Android! Please provide this summary of care documentation to your next provider. Your primary care clinician is listed as Ann Rodrigues. If you have any questions after today's visit, please call [phone].

## 2018-03-01 NOTE — PROGRESS NOTES
Patient: Carolina Singh MRN: [id number]  SSN: [id number]    YOB: 1970  Age: 49 y.o. Sex: female      Date of Service: 3/1/2018   Provider: CHRISSY Shah   Office Location:   [address] Trae carmen, [address] [facility] [address]  Office Phone: [phone]  Office Fax: [phone]        REASON FOR VISIT:   Chief Complaint   Patient presents with    New Patient    Hypertension     f/u    Other     head congestion x 2 days and ears itching    Sore Throat     had sore throat 2 days ago    Medication Evaluation     patient wants Rx on Nystatin Powder for rash underneath breasts        VITALS:   Visit Vitals    /70 (BP 1 Location: Left arm, BP Patient Position: Sitting)    Pulse 80    Temp 98.3 °F (36.8 °C) (Oral)    Resp 16    Ht 5' 1\" (1.549 m)    Wt 273 lb 9.6 oz (124.1 kg)    LMP 12/17/2017    SpO2 97%    BMI 51.7 kg/m2       MEDICATIONS:   Current Outpatient Prescriptions   Medication Sig Dispense Refill    GUAIFENESIN/DEXTROMETHORPHAN (CORICIDIN HBP PO) Take  by mouth.  ibuprofen (MOTRIN) 800 mg tablet TAKE 1 TABLET BY MOUTH EVERY 8 HOURS AS NEEDED FOR PAIN 40 Tab 0    diphenhydrAMINE (BENADRYL) 25 mg capsule Take 1-2 tabs every 6 hours as needed. 16 Cap 0    lisinopril-hydrochlorothiazide (PRINZIDE, ZESTORETIC) 20-25 mg per tablet TAKE 1 TABLET BY MOUTH DAILY 90 Tab 1    metoprolol (LOPRESSOR) 100 mg tablet Take 1 Tab by mouth daily.  90 Tab 1    methylPREDNISolone (MEDROL, AGUILA,) 4 mg tablet As prescribed 1 Dose Pack 0        ALLERGIES:   No Known Allergies     ACTIVE MEDICAL PROBLEMS:  Patient Active Problem List   Diagnosis Code    HTN (hypertension), benign I10    Obesity, morbid ([facility] E66.01        MEDICAL/SURGICAL HISTORY:  Past Medical History:   Diagnosis Date    Acid reflux     Acute cystitis with hematuria     Heel spur     HTN (hypertension), benign 12/2/2009    Irregular menses     Menorrhagia     Plantar fasciitis     Shoulder bursitis     Urticaria     Vulvovaginitis       Past Surgical History:   Procedure Laterality Date    HX CHOLECYSTECTOMY      HX GYN  2003        HX GYN  2005    BTL        FAMILY HISTORY:  Family History   Problem Relation Age of Onset    Cancer Mother     Diabetes Mother     Heart Failure Mother    Adali Romano Arthritis-rheumatoid Mother     Stroke Father     Hypertension Father     Substance Abuse Father      tobacco use    Alzheimer Father     Cancer Brother     Diabetes Brother     Drug Abuse Brother     Alcohol abuse Maternal Grandfather     Attention Deficit Disorder Daughter     Heart Attack Maternal Aunt         SOCIAL HISTORY:  Social History   Substance Use Topics    Smoking status: Never Smoker    Smokeless tobacco: Never Used    Alcohol use Yes      Comment: rare            HISTORY OF PRESENT ILLNESS:   Charlie Johnson is a 49 y.o. female who presents to the office to establish care as a new patient    Chronic Medical Problems -     Hypertension -   Currently, the patient's condition is well controlled  Reports compliance with the following regimen: lopressor 100 mg daily, lisinopril-HCTZ 20-25 mg daily. reports she has been stable on this exact regimen for > 15 years. Had previously been following with her old PCP q 6 months for blood pressure checks. Notes that she is due for some routine fasting labs    Obesity -   Patient reports she goes \"up and down\" with her weight, and finds that throughout the course of a year she'll fluctuate between 240 and 275. She does not feel bothered by her weight, but is interested in losing weight to be healthier overall. Admits she eats a lot of fast food, but when she does cook she usually makes balanced meals with a protein, veggie, and a starch.  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 No known sick contacts. No fevers, chills, chest pain, or SOB. Using OTC cough meds without relief. Requests rx for Tussionex as this is what her old PCP used to give her, and it worked well. Rash -   Patient reports a rash under b/l breasts that comes and goes. Requesting nystatin powder today     Health Maintenance -   Previous PCP - Kaylie Loza   Last PE with routine labs - about a year ago   Last pap smear - 10/28/16, patient follows with ob/gyn   Last mammogram - about 2 years ago, normal per patient  Last DEXA scan - never  Last colonoscopy - never  Last flu shot - September 2017   Last pneumonia vaccine - never  Last Tdap booster - unknown    REVIEW OF SYSTEMS:  Review of Systems   Constitutional: Positive for malaise/fatigue. Negative for chills and fever. HENT: Positive for congestion and sore throat. Negative for ear pain. Eyes: Negative for blurred vision and double vision. Respiratory: Positive for cough. Negative for shortness of breath and wheezing. Cardiovascular: Negative for chest pain, palpitations and orthopnea. Gastrointestinal: Negative for abdominal pain, blood in stool, constipation, diarrhea, nausea and vomiting. Skin: Positive for rash. Neurological: Negative for dizziness, sensory change and headaches. Endo/Heme/Allergies: Positive for environmental allergies. Negative for polydipsia. Does not bruise/bleed easily. PHYSICAL EXAMINATION:  Physical Exam   Constitutional: She is oriented to person, place, and time and well-developed, well-nourished, and in no distress. HENT:   Head: Normocephalic and atraumatic. Right Ear: External ear normal.   Left Ear: External ear normal.   Nose: Nose normal.   Mouth/Throat: Uvula is midline and mucous membranes are normal. Posterior oropharyngeal erythema present. No oropharyngeal exudate, posterior oropharyngeal edema or tonsillar abscesses.    Eyes: Conjunctivae are normal. Pupils are equal, round, and reactive to light. Right eye exhibits no discharge. Left eye exhibits no discharge. Neck: Neck supple. Cardiovascular: Normal rate, regular rhythm and normal heart sounds. Exam reveals no gallop and no friction rub. No murmur heard. Pulmonary/Chest: Effort normal and breath sounds normal. No stridor. She has no wheezes. She has no rales. Lymphadenopathy:     She has cervical adenopathy. Neurological: She is alert and oriented to person, place, and time. Gait normal.   Skin: Skin is warm and dry. Psychiatric: Mood, memory and affect normal.        RESULTS:  No results found for this visit on 03/01/18. ASSESSMENT/PLAN:  Diagnoses and all orders for this visit:    1. Essential hypertension  - Well controlled  - Meds refilled today  Orders:    -     lisinopril-hydroCHLOROthiazide (PRINZIDE, ZESTORETIC) 20-25 mg per tablet; TAKE 1 TABLET BY MOUTH DAILY  -     metoprolol tartrate (LOPRESSOR) 100 mg IR tablet; Take 1 Tab by mouth daily. 2. Obesity, morbid ([address]  - Counseled on making healthier food choices and getting regular exercise  - healthy living handout provided  - Check fasting labs at earliest convenience to screen for DM and lipid disorders  Orders:    -     CBC WITH AUTOMATED DIFF; Future  -     METABOLIC PANEL, COMPREHENSIVE  -     LIPID PANEL; Future  -     HEMOGLOBIN A1C W/O EAG; Future  -     TSH 3RD GENERATION; Future    3. Strep throat  - Rapid strep positive in office, start abx as below  Orders:    -     AMB POC RAPID STREP A  -     amoxicillin 500 mg tab; Take 1 tab PO BID x 10 days    4. Upper respiratory tract infection, unspecified type   - Agreed to prescribe Tussionex for cough for bedtime use only. use sparingly  Orders:    -     chlorpheniramine-HYDROcodone (TUSSIONEX PENNKINETIC ER) 10-8 mg/5 mL suspension; Take 5 mL by mouth every twelve (12) hours as needed for Cough. Max Daily Amount: 10 mL.     5. Candidal dermatitis  Orders:    -     nystatin (MYCOSTATIN) powder; Apply  to affected area two (2) times daily as needed. 6. Screening for breast cancer  - Routine mammogram ordered  - Patient gets her yearly breast exam at ob/gyn  Orders:    -     [facility] BI SCREENING INCL CAD; Future        Follow-up Disposition:  Return in about 6 months (around 9/1/2018) for routine care.        PATIENT CARE TEAM:   Patient Care Team:  CHRISSY Holliday as PCP - General (Physician Assistant)       CHRISSY Holliday   March 1, 2018    4:30 PM

## 2018-03-01 NOTE — PROGRESS NOTES
1. Have you been to the ER, urgent care clinic since your last visit? Hospitalized since your last visit? No    2. Have you seen or consulted any other health care providers outside of the [address] [place] [address] since your last visit? Include any pap smears or colon screening.  No    Last flu vaccine 9/2017

## 2018-03-02 DIAGNOSIS — E66.01 OBESITY, MORBID (HCC): ICD-10-CM

## 2018-04-03 ENCOUNTER — HOSPITAL ENCOUNTER (EMERGENCY)
Age: 48
Discharge: HOME | End: 2018-04-03
Attending: EMERGENCY MEDICINE | Admitting: EMERGENCY MEDICINE
Payer: MEDICAID

## 2018-04-03 VITALS
BODY MASS INDEX: 51.92 KG/M2 | RESPIRATION RATE: 18 BRPM | TEMPERATURE: 98.6 F | HEIGHT: 61 IN | HEART RATE: 87 BPM | WEIGHT: 275 LBS | DIASTOLIC BLOOD PRESSURE: 76 MMHG | SYSTOLIC BLOOD PRESSURE: 134 MMHG | OXYGEN SATURATION: 99 %

## 2018-04-03 DIAGNOSIS — M25.561 ARTHRALGIA OF RIGHT LOWER LEG: Primary | ICD-10-CM

## 2018-04-03 LAB — D DIMER PPP FEU-MCNC: 0.85 UG/ML(FEU)

## 2018-04-03 PROCEDURE — 85379 FIBRIN DEGRADATION QUANT: CPT | Performed by: PHYSICIAN ASSISTANT

## 2018-04-03 PROCEDURE — 93971 EXTREMITY STUDY: CPT

## 2018-04-03 PROCEDURE — 99283 EMERGENCY DEPT VISIT LOW MDM: CPT

## 2018-04-03 NOTE — ED NOTES
Duane from [facility]ascular called from [facility], states he has one more study to do prior to coming to do study ordered here. PA Obadiah Severe and patient made aware. Patient becoming frustrated with waiting. Offered room with TV or refreshments. Patient states she will wait until 1815.

## 2018-04-03 NOTE — PROCEDURES
[facility]  *** FINAL REPORT ***    Name: Simon Lozano  MRN: [id number]  : 1970  HIS Order #: [id number]  [address] [facility] Visit #: [id number]  Date: 2018    TYPE OF TEST: Peripheral Venous Testing    REASON FOR TEST  Pain in limb, Limb swelling    Right Leg:-  Deep venous thrombosis:           No  Superficial venous thrombosis:    No  Deep venous insufficiency:        Not examined  Superficial venous insufficiency: Not examined      INTERPRETATION/FINDINGS  Duplex images were obtained using 2-D gray scale, color flow, and  spectral Doppler analysis. Right leg :  1. Deep veins visualized include the common femoral, femoral,  popliteal, posterior tibial and peroneal veins. 2. No evidence of deep venous thrombosis detected in the veins  visualized. 3. No evidence of deep vein thrombosis in the contralateral common  femoral vein. 4. Superficial veins visualized include the great saphenous vein. 5. No evidence of superficial thrombosis detected. 6. Normal multiphasic flow in the posterior tibial artery. ADDITIONAL COMMENTS    I have personally reviewed the data relevant to the interpretation of  this  study. TECHNOLOGIST: MINA Middleton, RVS  Signed: 2018 06:45 PM    PHYSICIAN: Cristal Mccord.  Leticia Jolley MD  Signed: 2018 08:49 AM

## 2018-04-03 NOTE — ED TRIAGE NOTES
C/o right knee pain (chronic) with pain radiating into right lower leg/calf & right foot since yesterday. Pt states her ortho instructed her to come to ER to evaluate for possible DVT. Has ortho appt on Monday.

## 2018-04-03 NOTE — DISCHARGE INSTRUCTIONS
Leg Pain: Care Instructions  Your Care Instructions  Many things can cause leg pain. Too much exercise or overuse can cause a muscle cramp (or charley horse). You can get leg cramps from not eating a balanced diet that has enough potassium, calcium, and other minerals. If you do not drink enough fluids or are taking certain medicines, you may develop leg cramps. Other causes of leg pain include injuries, blood flow problems, nerve damage, and twisted and enlarged veins (varicose veins). You can usually ease pain with self-care. Your doctor may recommend that you rest your leg and keep it elevated. Follow-up care is a key part of your treatment and safety. Be sure to make and go to all appointments, and call your doctor if you are having problems. It's also a good idea to know your test results and keep a list of the medicines you take. How can you care for yourself at home? · Take pain medicines exactly as directed. ¨ If the doctor gave you a prescription medicine for pain, take it as prescribed. ¨ If you are not taking a prescription pain medicine, ask your doctor if you can take an over-the-counter medicine. · Take any other medicines exactly as prescribed. Call your doctor if you think you are having a problem with your medicine. · Rest your leg while you have pain, and avoid standing for long periods of time. · Prop up your leg at or above the level of your heart when possible. · Make sure you are eating a balanced diet that is rich in calcium, potassium, and magnesium, especially if you are pregnant. · If directed by your doctor, put ice or a cold pack on the area for 10 to 20 minutes at a time. Put a thin cloth between the ice and your skin. · Your leg may be in a splint, a brace, or an elastic bandage, and you may have crutches to help you walk. Follow your doctor's directions about how long to wear supports and how to use the crutches. When should you call for help?   Call 911 anytime you think you may need emergency care. For example, call if:  ? · You have sudden chest pain and shortness of breath, or you cough up blood. ? · Your leg is cool or pale or changes color. ?Call your doctor now or seek immediate medical care if:  ? · You have increasing or severe pain. ? · Your leg suddenly feels weak and you cannot move it. ? · You have signs of a blood clot, such as:  ¨ Pain in your calf, back of the knee, thigh, or groin. ¨ Redness and swelling in your leg or groin. ? · You have signs of infection, such as:  ¨ Increased pain, swelling, warmth, or redness. ¨ Red streaks leading from the sore area. ¨ Pus draining from a place on your leg. ¨ A fever. ? · You cannot bear weight on your leg. ? Watch closely for changes in your health, and be sure to contact your doctor if:  ? · You do not get better as expected. Where can you learn more? Go to http://ivan-joshua.info/. Enter D467 in the search box to learn more about \"Leg Pain: Care Instructions. \"  Current as of: March 20, 2017  Content Version: 11.4  © 9392-7983 [employer]. Care instructions adapted under license by [employer] (which disclaims liability or warranty for this information). If you have questions about a medical condition or this instruction, always ask your healthcare professional. [place] [address] any warranty or liability for your use of this information.

## 2018-04-03 NOTE — ED NOTES
Pt advised that vascular tech is coming from [facility] to perform her vascular study. Pt states she is unsure if she wants to wait for test.  States \"I will think about it\". CHRISSY Hess aware.

## 2018-04-03 NOTE — ED PROVIDER NOTES
HPI Comments: Pt reports right calf pain since yesterday. Hx of knee pain so called her ortho doctor and instructed to come to ED to r/o DVT. Denies recent travel, immobilization, hx of cancer, bleeding disorders, smoking, OCP use. No injury or trauma. No pain in right knee or ankle. No sob. No cp. Patient is a 49 y.o. female presenting with leg pain and foot pain. The history is provided by the patient. Leg Pain    Pertinent negatives include no back pain and no neck pain. Foot Pain    Pertinent negatives include no back pain and no neck pain. Past Medical History:   Diagnosis Date    Acid reflux     Acute cystitis with hematuria     Heel spur     HTN (hypertension), benign 2009    Irregular menses     Menorrhagia     Plantar fasciitis     Shoulder bursitis     Urticaria     Vulvovaginitis        Past Surgical History:   Procedure Laterality Date    HX CHOLECYSTECTOMY      HX GYN  2003        HX GYN  2005    BTL         Family History:   Problem Relation Age of Onset    Cancer Mother     Diabetes Mother     Heart Failure Mother    [facility] Arthritis-rheumatoid Mother     Stroke Father     Hypertension Father     Substance Abuse Father      tobacco use    Alzheimer Father     Cancer Brother     Diabetes Brother     Drug Abuse Brother     Alcohol abuse Maternal Grandfather     Attention Deficit Disorder Daughter     Heart Attack Maternal Aunt        Social History     Social History    Marital status: SINGLE     Spouse name: N/A    Number of children: N/A    Years of education: N/A     Occupational History    Not on file.      Social History Main Topics    Smoking status: Never Smoker    Smokeless tobacco: Never Used    Alcohol use Yes      Comment: rare    Drug use: No    Sexual activity: Not Currently     Partners: Male     Birth control/ protection: Surgical      Comment: tubal ligation     Other Topics Concern    Not on file     Social History Narrative ALLERGIES: Review of patient's allergies indicates no known allergies. Review of Systems   Constitutional: Negative for chills and fever. HENT: Negative. Negative for congestion and facial swelling. Eyes: Negative for discharge and redness. Respiratory: Negative for cough and shortness of breath. Cardiovascular: Negative for chest pain. Gastrointestinal: Negative for abdominal pain, nausea and vomiting. Endocrine: Negative. Genitourinary: Negative. Musculoskeletal: Positive for myalgias. Negative for back pain and neck pain. Skin: Negative for rash and wound. Allergic/Immunologic: Negative. Neurological: Negative for dizziness, light-headedness and headaches. Hematological: Negative. Psychiatric/Behavioral: Negative. All other systems reviewed and are negative. There were no vitals filed for this visit. Physical Exam   Constitutional: She is oriented to person, place, and time. She appears well-developed and well-nourished. No distress. HENT:   Head: Normocephalic and atraumatic. Mouth/Throat: Oropharynx is clear and moist.   Eyes: Conjunctivae are normal.   Neck: Normal range of motion. Neck supple. Cardiovascular: Normal rate, regular rhythm and normal heart sounds. Pulmonary/Chest: Effort normal and breath sounds normal. No respiratory distress. She has no wheezes. She exhibits no tenderness. Abdominal: Soft. She exhibits no distension. There is no tenderness. Musculoskeletal: Normal range of motion. Right knee: Normal. She exhibits normal range of motion, no swelling, no effusion, no ecchymosis, no deformity, no erythema, normal alignment, no LCL laxity and normal patellar mobility. No tenderness found. Right ankle: Normal. Achilles tendon normal. Achilles tendon exhibits no pain, no defect and normal Cardenas's test results. Right lower leg: She exhibits tenderness.  She exhibits no bony tenderness, no swelling, no edema, no deformity and no laceration. Right calf TTP. no swelling, erythema or warmth to RLE. Normal LLE. Neurological: She is alert and oriented to person, place, and time. No cranial nerve deficit. Coordination normal.   Skin: Skin is warm and dry. No rash noted. She is not diaphoretic. Psychiatric: She has a normal mood and affect. Nursing note and vitals reviewed. MDM  Number of Diagnoses or Management Options  Arthralgia of right lower leg:   Diagnosis management comments: No bony tenderness, recent injuries/trauma. No indication for imaging  D-dimer elevated, US of right LE obtained and negative for DVT. Increased length of stay due to [place] delay.          ED Course       Procedures

## 2018-07-24 ENCOUNTER — OFFICE VISIT (OUTPATIENT)
Dept: FAMILY MEDICINE CLINIC | Age: 48
End: 2018-07-24

## 2018-07-24 VITALS
DIASTOLIC BLOOD PRESSURE: 76 MMHG | WEIGHT: 275.4 LBS | HEART RATE: 77 BPM | OXYGEN SATURATION: 98 % | SYSTOLIC BLOOD PRESSURE: 124 MMHG | RESPIRATION RATE: 12 BRPM | TEMPERATURE: 98 F | HEIGHT: 62 IN | BODY MASS INDEX: 50.68 KG/M2

## 2018-07-24 DIAGNOSIS — R35.0 URINARY FREQUENCY: ICD-10-CM

## 2018-07-24 DIAGNOSIS — R10.2 SUPRAPUBIC PAIN: ICD-10-CM

## 2018-07-24 DIAGNOSIS — N89.8 VAGINAL ODOR: ICD-10-CM

## 2018-07-24 DIAGNOSIS — N89.8 VAGINAL DISCHARGE: ICD-10-CM

## 2018-07-24 DIAGNOSIS — R35.0 URINARY FREQUENCY: Primary | ICD-10-CM

## 2018-07-24 LAB
BILIRUB UR QL STRIP: NEGATIVE
GLUCOSE UR-MCNC: NEGATIVE MG/DL
KETONES P FAST UR STRIP-MCNC: NEGATIVE MG/DL
PH UR STRIP: 6.5 [PH] (ref 4.6–8)
PROT UR QL STRIP: NEGATIVE
SP GR UR STRIP: 1.01 (ref 1–1.03)
UA UROBILINOGEN AMB POC: NORMAL (ref 0.2–1)
URINALYSIS CLARITY POC: CLEAR
URINALYSIS COLOR POC: YELLOW
URINE BLOOD POC: NEGATIVE
URINE LEUKOCYTES POC: NEGATIVE
URINE NITRITES POC: NEGATIVE

## 2018-07-24 RX ORDER — METRONIDAZOLE 500 MG/1
500 TABLET ORAL 2 TIMES DAILY
Qty: 14 TAB | Refills: 0 | Status: SHIPPED | OUTPATIENT
Start: 2018-07-24 | End: 2018-07-31

## 2018-07-24 NOTE — PROGRESS NOTES
Patient: Hina Meraz MRN: [id number]  SSN: [id number]    YOB: 1970  Age: 49 y.o. Sex: female      Date of Service: 7/24/2018   Provider: CHRISSY Car   Office Location:   [address] [facility]  [address]  Office Phone: [phone]  Office Fax: [phone]        REASON FOR VISIT:   Chief Complaint   Patient presents with    Hypertension    Urinary Frequency     1 week    Abdominal Pain     1 week        VITALS:   Visit Vitals    /76 (BP 1 Location: Left arm, BP Patient Position: Sitting)    Pulse 77    Temp 98 °F (36.7 °C) (Oral)    Resp 12    Ht 5' 2\" (1.575 m)    Wt 275 lb 6.4 oz (124.9 kg)    SpO2 98%    BMI 50.37 kg/m2       MEDICATIONS:   Current Outpatient Prescriptions   Medication Sig Dispense Refill    lisinopril-hydroCHLOROthiazide (PRINZIDE, ZESTORETIC) 20-25 mg per tablet TAKE 1 TABLET BY MOUTH DAILY 90 Tab 1    metoprolol tartrate (LOPRESSOR) 100 mg IR tablet Take 1 Tab by mouth daily. 90 Tab 1    ibuprofen (MOTRIN) 800 mg tablet TAKE 1 TABLET BY MOUTH EVERY 8 HOURS AS NEEDED FOR PAIN 40 Tab 0        ALLERGIES:   No Known Allergies     ACTIVE MEDICAL PROBLEMS:  Patient Active Problem List   Diagnosis Code    [facility] morbid ([facility] E66.01    Essential hypertension I10          HISTORY OF PRESENT ILLNESS:   Hina Meraz is a 49 y.o. female who presents to the office for acute care. Here today with complaints of possible UTI. Patient reports 4 day history of urinary frequency, suprapubic pressure, and fatigue. She typically gets a UTI about once a year, and symptoms feel similar. She has, however, also noted some vaginal discharge with a fishy odor over about the same time frame. She declines a pelvic exam today as she has an appointment with gynecology for her annual well woman exam tomorrow.     The patient denies fevers, chills, nausea, vomiting, flank pain, vaginal bleeding, itching, or sores. She has not noted any hematuria or dysuria. REVIEW OF SYSTEMS:  ROS as detailed in HPI     PHYSICAL EXAMINATION:  Physical Exam   Constitutional: She is oriented to person, place, and time and well-developed, well-nourished, and in no distress. Cardiovascular: Normal rate, regular rhythm and normal heart sounds. Exam reveals no gallop and no friction rub. No murmur heard. Pulmonary/Chest: Effort normal and breath sounds normal. She has no wheezes. She has no rales. Abdominal: Soft. Bowel sounds are normal. She exhibits no distension and no mass. There is no tenderness. There is no rebound and no guarding.   (-) CVA tenderness   Neurological: She is alert and oriented to person, place, and time. Gait normal.   Skin: Skin is warm and dry. No rash noted. Psychiatric: Mood, memory and affect normal.        RESULTS:  Results for orders placed or performed in visit on 07/24/18   AMB POC URINALYSIS DIP STICK AUTO W/ MICRO   Result Value Ref Range    Color (UA POC) Yellow     Clarity (UA POC) Clear     Glucose (UA POC) Negative Negative    Bilirubin (UA POC) Negative Negative    Ketones (UA POC) Negative Negative    Specific gravity (UA POC) 1.015 1.001 - 1.035    Blood (UA POC) Negative Negative    pH (UA POC) 6.5 4.6 - 8.0    Protein (UA POC) Negative Negative    Urobilinogen (UA POC) 0.2 mg/dL 0.2 - 1    Nitrites (UA POC) Negative Negative    Leukocyte esterase (UA POC) Negative Negative        ASSESSMENT/PLAN:  Diagnoses and all orders for this visit:    1. Urinary frequency  2. Suprapubic pain  3. Vaginal discharge  4. Vaginal odor  - UA was essentially normal, though some of patients symptoms favor UTI over vaginal infection. Will send urine culture to r/o UTI  - In the meantime, I will send metronidazole to empirically cover BV given vaginal discharge with fishy odor. Patient declined a pelvic exam today as she is seeing her gynecologist tomorrow.  Encouraged her to mention these symptoms to gyn as well  Orders:    -     AMB POC URINALYSIS DIP STICK AUTO W/ MICRO  -     CULTURE, URINE; Future  -     metroNIDAZOLE (FLAGYL) 500 mg tablet; Take 1 Tab by mouth two (2) times a day for 7 days. All questions answered. Patient expresses understanding and agrees with the plan as detailed above.     Follow-up Disposition: Return for follow up as scheduled       PATIENT CARE TEAM:   Patient Care Team:  CHRISSY Cuevas as PCP - General (Physician Assistant)       Juanis Cuevas   July 24, 2018    4:01 PM

## 2018-07-24 NOTE — LETTER
NOTIFICATION RETURN TO WORK  
 
7/24/2018 3:48 PM 
 
Ms. Stephon Goodson [address] [facility] [address] To Whom It May Concern: 
 
Stephon Goodson is currently under the care of [address]  . She will return to work 7/26/18 If there are questions or concerns please have the patient contact our office.  
 
 
 
Sincerely, 
 
 
CHRISSY Mendez

## 2018-07-24 NOTE — PROGRESS NOTES
Chief Complaint   Patient presents with    Hypertension    Urinary Frequency     1 week    Abdominal Pain     1 week     Visit Vitals    /76 (BP 1 Location: Left arm, BP Patient Position: Sitting)    Pulse 77    Temp 98 °F (36.7 °C) (Oral)    Resp 12    Ht 5' 2\" (1.575 m)    Wt 275 lb 6.4 oz (124.9 kg)    SpO2 98%    BMI 50.37 kg/m2       Patient is not fasting. Patient in room # 7.     1. Have you been to the ER, urgent care clinic since your last visit? Hospitalized since your last visit? Yes When: 06/2018 Where: [facility] Reason for visit: Finger cut on right 2nd digit    2. Have you seen or consulted any other health care providers outside of the [address] [place] [address] since your last visit? Include any pap smears or colon screening. No     Reviewed.  Tdap at [facility]  Verbal order for in office urinalysis per CHRISSY Contreras/Chey Carmona LPN

## 2018-07-24 NOTE — MR AVS SNAPSHOT
[address] [facility] [address] [facility] [address] 
[phone] Patient: Deja Smith MRN: [id number] :1970 Visit Information Date & Time Provider Department Dept. Phone Encounter #  
 2018  3:30 PM Kiko Pandey, [address] [place] [address] [id number] Your Appointments 2018  8:30 AM  
FOLLOW UP EXAM with CHRISSY Cooney  [facility] ([facility]) Appt Note: [address] [facility] 12 F/U  
 [address] [facility]  
[address] [facility] [address] [facility] Upcoming Health Maintenance Date Due DTaP/Tdap/Td series (1 - Tdap) 2018 Influenza Age 5 to Adult 2018 PAP AKA CERVICAL CYTOLOGY 10/28/2019 Allergies as of 2018  Review Complete On: 2018 By: CHRISSY Cooney No Known Allergies Current Immunizations  Never Reviewed Name Date Influenza Vaccine 2017 Td, Adsorbed 6/10/2018 11:38 PM  
  
 Not reviewed this visit You Were Diagnosed With   
  
 Codes Comments Urinary frequency    -  Primary ICD-10-CM: R35.0 ICD-9-CM: 788.41 Vaginal discharge     ICD-10-CM: N89.8 ICD-9-CM: 623.5 Vitals BP Pulse Temp Resp Height(growth percentile) Weight(growth percentile) 124/76 (BP 1 Location: Left arm, BP Patient Position: Sitting) 77 98 °F (36.7 °C) (Oral) 12 5' 2\" (1.575 m) 275 lb 6.4 oz (124.9 kg) SpO2 BMI OB Status Smoking Status 98% 50.37 kg/m2 Perimenopausal Never Smoker Vitals History BMI and BSA Data Body Mass Index Body Surface Area  
 50.37 kg/m 2 2.34 m 2 Preferred Pharmacy Pharmacy Name Phone [facility] [address] [facility] [phone] Your Updated Medication List  
  
   
 This list is accurate as of 7/24/18  3:55 PM.  Always use your most recent med list.  
  
  
  
  
 ibuprofen 800 mg tablet Commonly known as:  MOTRIN  
TAKE 1 TABLET BY MOUTH EVERY 8 HOURS AS NEEDED FOR PAIN  
  
 lisinopril-hydroCHLOROthiazide 20-25 mg per tablet Commonly known as:  PRINZIDE, ZESTORETIC  
TAKE 1 TABLET BY MOUTH DAILY  
  
 metoprolol tartrate 100 mg IR tablet Commonly known as:  LOPRESSOR Take 1 Tab by mouth daily. metroNIDAZOLE 500 mg tablet Commonly known as:  FLAGYL Take 1 Tab by mouth two (2) times a day for 7 days. Prescriptions Sent to Pharmacy Refills  
 metroNIDAZOLE (FLAGYL) 500 mg tablet 0 Sig: Take 1 Tab by mouth two (2) times a day for 7 days. Class: Normal  
 Pharmacy: [address] [place] [address] #: [phone] Route: Oral  
  
We Performed the Following AMB POC URINALYSIS DIP STICK AUTO W/ MICRO [52355 CPT(R)] To-Do List   
 07/24/2018 Microbiology:  CULTURE, URINE Introducing [facility]! [facility] introduces [employer] patient portal. Now you can access parts of your medical record, email your doctor's office, and request medication refills online. 1. In your internet browser, go to [email or web address] 2. Click on the First Time User? Click Here link in the Sign In box. You will see the New Member Sign Up page. 3. Enter your [employer] Access Code exactly as it appears below. You will not need to use this code after youve completed the sign-up process. If you do not sign up before the expiration date, you must request a new code. · [employer] Access Code: [email or web address] Expires: 9/8/2018 11:21 PM 
 
4. Enter the last four digits of your Social Security Number (xxxx) and Date of Birth (mm/dd/yyyy) as indicated and click Submit. You will be taken to the next sign-up page. 5. Create a Teach [employer]n Go ID. This will be your [employer] login ID and cannot be changed, so think of one that is secure and easy to remember. 6. Create a [employer] Go password. You can change your password at any time. 7. Enter your Password Reset Question and Answer. This can be used at a later time if you forget your password. 8. Enter your e-mail address. You will receive e-mail notification when new information is available in [address] 9. Click Sign Up. You can now view and download portions of your medical record. 10. Click the Download Summary menu link to download a portable copy of your medical information. If you have questions, please visit the Frequently Asked Questions section of the [employer]ach [employer] Go website. Remember, [employer] is NOT to be used for urgent needs. For medical emergencies, dial 911. Now available from your iPhone and Android! Please provide this summary of care documentation to your next provider. Your primary care clinician is listed as Savannah Win. If you have any questions after today's visit, please call [phone].

## 2018-07-25 ENCOUNTER — HOSPITAL ENCOUNTER (OUTPATIENT)
Dept: LAB | Age: 48
Discharge: HOME | End: 2018-07-25

## 2018-07-25 ENCOUNTER — TELEPHONE (OUTPATIENT)
Dept: FAMILY MEDICINE CLINIC | Age: 48
End: 2018-07-25

## 2018-07-25 LAB — SENTARA SPECIMEN COL,SENBCF: NORMAL

## 2018-07-25 PROCEDURE — 99001 SPECIMEN HANDLING PT-LAB: CPT | Performed by: PHYSICIAN ASSISTANT

## 2018-07-25 NOTE — TELEPHONE ENCOUNTER
Received call. Verified name and . Spoke with patient. Per patient still having pain and urinary frequency. Per patient has stated medication. Per patient she would like a letter stating her return date of Monday. Patient was notified that will notify provider. Patient had no further questions or concerns.

## 2018-07-25 NOTE — TELEPHONE ENCOUNTER
I will print an updated letter. Still waiting on results of urine culture. Can you find out if her ob/gyn had any additional suggestions at her appointment today?

## 2018-07-25 NOTE — TELEPHONE ENCOUNTER
Called home number. Verified name and . Spoke with patient. Per patient obgyn did not give her anything and did not have any suggestions, they are waiting on culture. Patient was notified of letter at  for pick-up. Patient had no further questions or concerns.

## 2018-07-25 NOTE — LETTER
NOTIFICATION RETURN TO WORK  
 
7/25/2018 4:13 PM 
 
Ms. Naz Sanchez [address] [address] 
[facility] [address] To Whom It May Concern: 
 
Naz Sanchez is currently under the care of [facility]. She will return to work 7/30/18 If there are questions or concerns please have the patient contact our office.  
 
 
 
Sincerely, 
 
 
CHRISSY Gamboa

## 2018-07-26 LAB
25(OH)D3 SERPL-MCNC: 17.5 NG/ML (ref 32–100)
A-G RATIO,AGRAT: 1 RATIO (ref 1.1–2.6)
ABSOLUTE LYMPHOCYTE COUNT, 10803: 1.5 K/UL (ref 1–4.8)
ALBUMIN SERPL-MCNC: 3.8 G/DL (ref 3.5–5)
ALP SERPL-CCNC: 70 U/L (ref 25–115)
ALT SERPL-CCNC: 15 U/L (ref 5–40)
ANION GAP SERPL CALC-SCNC: 13 MMOL/L
AST SERPL W P-5'-P-CCNC: 14 U/L (ref 10–37)
AVG GLU, 10930: 128 MG/DL (ref 91–123)
BASOPHILS # BLD: 0 K/UL (ref 0–0.2)
BASOPHILS NFR BLD: 0 % (ref 0–2)
BILIRUB SERPL-MCNC: 0.5 MG/DL (ref 0.2–1.2)
BUN SERPL-MCNC: 14 MG/DL (ref 6–22)
CALCIUM SERPL-MCNC: 9.2 MG/DL (ref 8.4–10.5)
CHLORIDE SERPL-SCNC: 97 MMOL/L (ref 98–110)
CHOLEST SERPL-MCNC: 165 MG/DL (ref 110–200)
CO2 SERPL-SCNC: 29 MMOL/L (ref 20–32)
CREAT SERPL-MCNC: 0.7 MG/DL (ref 0.5–1.2)
EOSINOPHIL # BLD: 0.1 K/UL (ref 0–0.5)
EOSINOPHIL NFR BLD: 2 % (ref 0–6)
ERYTHROCYTE [DISTWIDTH] IN BLOOD BY AUTOMATED COUNT: 15.8 % (ref 10–15.5)
GFRAA, 66117: >60
GFRNA, 66118: >60
GLOBULIN,GLOB: 3.8 G/DL (ref 2–4)
GLUCOSE SERPL-MCNC: 104 MG/DL (ref 70–99)
GRANULOCYTES,GRANS: 75 % (ref 40–75)
HBA1C MFR BLD HPLC: 6.1 % (ref 4.8–5.9)
HCT VFR BLD AUTO: 42.3 % (ref 35.1–48)
HDLC SERPL-MCNC: 3.3 MG/DL (ref 0–5)
HDLC SERPL-MCNC: 50 MG/DL (ref 40–59)
HGB BLD-MCNC: 13.3 G/DL (ref 11.7–16)
LDLC SERPL CALC-MCNC: 101 MG/DL (ref 50–99)
LYMPHOCYTES, LYMLT: 20 % (ref 20–45)
MCH RBC QN AUTO: 28 PG (ref 26–34)
MCHC RBC AUTO-ENTMCNC: 31 G/DL (ref 31–36)
MCV RBC AUTO: 89 FL (ref 80–95)
MONOCYTES # BLD: 0.3 K/UL (ref 0.1–1)
MONOCYTES NFR BLD: 3 % (ref 3–12)
NEUTROPHILS # BLD AUTO: 5.7 K/UL (ref 1.8–7.7)
PLATELET # BLD AUTO: 314 K/UL (ref 140–440)
PMV BLD AUTO: 10.3 FL (ref 9–13)
POTASSIUM SERPL-SCNC: 3.8 MMOL/L (ref 3.5–5.5)
PROT SERPL-MCNC: 7.6 G/DL (ref 6.4–8.3)
RBC # BLD AUTO: 4.74 M/UL (ref 3.8–5.2)
RESULT: NORMAL
SODIUM SERPL-SCNC: 139 MMOL/L (ref 133–145)
TRIGL SERPL-MCNC: 68 MG/DL (ref 40–149)
TSH SERPL DL<=0.005 MIU/L-ACNC: 2.51 MCU/ML (ref 0.27–4.2)
VLDLC SERPL CALC-MCNC: 14 MG/DL (ref 8–30)
WBC # BLD AUTO: 7.6 K/UL (ref 4–11)

## 2018-07-26 NOTE — PROGRESS NOTES
Urine culture showed a contaminated sample. If she is still symptomatic, I would recommend coming in today to leave another urine sample (and please educate her on proper clean catch technique when she comes in) This can be be a nurse visit.

## 2018-07-26 NOTE — PROGRESS NOTES
Called home number. Verified name and . Spoke with patient. Patient notified of message below per Kayleen LOWE. Patient stated feeling better today and will come in on today or tomorrow to leave sample. Patient understood the reason for call and had no further questions or concerns.

## 2018-07-30 ENCOUNTER — TELEPHONE (OUTPATIENT)
Dept: FAMILY MEDICINE CLINIC | Age: 48
End: 2018-07-30

## 2018-07-30 RX ORDER — ACETAMINOPHEN 500 MG
2000 TABLET ORAL DAILY
Qty: 30 CAP | Refills: 2 | Status: SHIPPED | OUTPATIENT
Start: 2018-07-30 | End: 2019-01-14 | Stop reason: SDUPTHER

## 2018-07-30 NOTE — TELEPHONE ENCOUNTER
Called home number. Verified name and . Spoke with patient. Patient notified of results below Harish LOWE. Patient requested order for Vit D. Notified that insurance may not cover and she may have to come out of pocket. Patient had no further questions or concerns. CHRISSY Bro, KARLO                     Blood counts - normal   Blood sugar - A1c is elevated in the pre-diabetic range. Would recommend working on weight loss through diet and exercise. We can discuss in greater detail at her upcoming follow up visit   Cholesterol - looks ok   Vitamin D - low. Recommend starting OTC vitamin D supplement of 2,000 units daily. Will plan to re-check this in about 12 weeks.    Kidney, Liver & Thyroid function - normal

## 2018-07-30 NOTE — PROGRESS NOTES
Blood counts - normal  Blood sugar - A1c is elevated in the pre-diabetic range. Would recommend working on weight loss through diet and exercise. We can discuss in greater detail at her upcoming follow up visit   Cholesterol - looks ok   Vitamin D - low. Recommend starting OTC vitamin D supplement of 2,000 units daily. Will plan to re-check this in about 12 weeks.   Kidney, Liver & Thyroid function - normal

## 2018-08-22 ENCOUNTER — TELEPHONE (OUTPATIENT)
Dept: FAMILY MEDICINE CLINIC | Age: 48
End: 2018-08-22

## 2018-08-22 NOTE — TELEPHONE ENCOUNTER
Called home number. Left message on answering machine to return the call. Call concerning which medication is patient requesting.

## 2018-09-10 ENCOUNTER — OFFICE VISIT (OUTPATIENT)
Dept: FAMILY MEDICINE CLINIC | Age: 48
End: 2018-09-10

## 2018-09-10 VITALS
HEIGHT: 62 IN | TEMPERATURE: 98.7 F | DIASTOLIC BLOOD PRESSURE: 74 MMHG | SYSTOLIC BLOOD PRESSURE: 120 MMHG | BODY MASS INDEX: 51.27 KG/M2 | RESPIRATION RATE: 12 BRPM | OXYGEN SATURATION: 99 % | HEART RATE: 71 BPM | WEIGHT: 278.6 LBS

## 2018-09-10 DIAGNOSIS — J01.90 ACUTE BACTERIAL SINUSITIS: Primary | ICD-10-CM

## 2018-09-10 DIAGNOSIS — N94.6 DYSMENORRHEA: ICD-10-CM

## 2018-09-10 DIAGNOSIS — R05.9 COUGH: ICD-10-CM

## 2018-09-10 DIAGNOSIS — B96.89 ACUTE BACTERIAL SINUSITIS: Primary | ICD-10-CM

## 2018-09-10 RX ORDER — HYDROCODONE POLISTIREX AND CHLORPHENIRAMINE POLISTIREX 10; 8 MG/5ML; MG/5ML
5 SUSPENSION, EXTENDED RELEASE ORAL
Qty: 115 ML | Refills: 0 | Status: SHIPPED | OUTPATIENT
Start: 2018-09-10 | End: 2019-01-17 | Stop reason: SDUPTHER

## 2018-09-10 RX ORDER — IBUPROFEN 800 MG/1
TABLET ORAL
Qty: 30 TAB | Refills: 0 | Status: SHIPPED | OUTPATIENT
Start: 2018-09-10 | End: 2019-01-17

## 2018-09-10 RX ORDER — AMOXICILLIN 500 MG/1
500 TABLET, FILM COATED ORAL 3 TIMES DAILY
Qty: 30 TAB | Refills: 0 | Status: SHIPPED | OUTPATIENT
Start: 2018-09-10 | End: 2018-09-20

## 2018-09-10 NOTE — PATIENT INSTRUCTIONS
Cough: Care Instructions  Your Care Instructions    A cough is your body's response to something that bothers your throat or airways. Many things can cause a cough. You might cough because of a cold or the flu, bronchitis, or asthma. Smoking, postnasal drip, allergies, and stomach acid that backs up into your throat also can cause coughs. A cough is a symptom, not a disease. Most coughs stop when the cause, such as a cold, goes away. You can take a few steps at home to cough less and feel better. Follow-up care is a key part of your treatment and safety. Be sure to make and go to all appointments, and call your doctor if you are having problems. It's also a good idea to know your test results and keep a list of the medicines you take. How can you care for yourself at home? · Drink lots of water and other fluids. This helps thin the mucus and soothes a dry or sore throat. Honey or lemon juice in hot water or tea may ease a dry cough. · Take cough medicine as directed by your doctor. · Prop up your head on pillows to help you breathe and ease a dry cough. · Try cough drops to soothe a dry or sore throat. Cough drops don't stop a cough. Medicine-flavored cough drops are no better than candy-flavored drops or hard candy. · Do not smoke. Avoid secondhand smoke. If you need help quitting, talk to your doctor about stop-smoking programs and medicines. These can increase your chances of quitting for good. When should you call for help? Call 911 anytime you think you may need emergency care.  For example, call if:    · You have severe trouble breathing.    Call your doctor now or seek immediate medical care if:    · You cough up blood.     · You have new or worse trouble breathing.     · You have a new or higher fever.     · You have a new rash.    Watch closely for changes in your health, and be sure to contact your doctor if:    · You cough more deeply or more often, especially if you notice more mucus or a change in the color of your mucus.     · You have new symptoms, such as a sore throat, an earache, or sinus pain.     · You do not get better as expected. Where can you learn more? Go to http://ivan-joshua.info/. Enter D279 in the search box to learn more about \"Cough: Care Instructions. \"  Current as of: December 6, 2017  Content Version: 11.7  © 5932-0350 [employer]. Care instructions adapted under license by [employer] (which disclaims liability or warranty for this information). If you have questions about a medical condition or this instruction, always ask your healthcare professional. [address] any warranty or liability for your use of this information. Sinusitis: Care Instructions  Your Care Instructions    Sinusitis is an infection of the lining of the sinus cavities in your head. Sinusitis often follows a cold. It causes pain and pressure in your head and face. In most cases, sinusitis gets better on its own in 1 to 2 weeks. But some mild symptoms may last for several weeks. Sometimes antibiotics are needed. Follow-up care is a key part of your treatment and safety. Be sure to make and go to all appointments, and call your doctor if you are having problems. It's also a good idea to know your test results and keep a list of the medicines you take. How can you care for yourself at home? · Take an over-the-counter pain medicine, such as acetaminophen (Tylenol), ibuprofen (Advil, Motrin), or naproxen (Aleve). Read and follow all instructions on the label. · If the doctor prescribed antibiotics, take them as directed. Do not stop taking them just because you feel better. You need to take the full course of antibiotics. · Be careful when taking over-the-counter cold or flu medicines and Tylenol at the same time. Many of these medicines have acetaminophen, which is Tylenol.  Read the labels to make sure that you are not taking more than the recommended dose. Too much acetaminophen (Tylenol) can be harmful. · Breathe warm, moist air from a steamy shower, a hot bath, or a sink filled with hot water. Avoid cold, dry air. Using a humidifier in your home may help. Follow the directions for cleaning the machine. · Use saline (saltwater) nasal washes to help keep your nasal passages open and wash out mucus and bacteria. You can buy saline nose drops at a grocery store or drugstore. Or you can make your own at home by adding 1 teaspoon of salt and 1 teaspoon of baking soda to 2 cups of distilled water. If you make your own, fill a bulb syringe with the solution, insert the tip into your nostril, and squeeze gently. María Herrera your nose. · Put a hot, wet towel or a warm gel pack on your face 3 or 4 times a day for 5 to 10 minutes each time. · Try a decongestant nasal spray like oxymetazoline (Afrin). Do not use it for more than 3 days in a row. Using it for more than 3 days can make your congestion worse. When should you call for help? Call your doctor now or seek immediate medical care if:    · You have new or worse swelling or redness in your face or around your eyes.     · You have a new or higher fever.    Watch closely for changes in your health, and be sure to contact your doctor if:    · You have new or worse facial pain.     · The mucus from your nose becomes thicker (like pus) or has new blood in it.     · You are not getting better as expected. Where can you learn more? Go to http://ivan-joshua.info/. Enter V379 in the search box to learn more about \"Sinusitis: Care Instructions. \"  Current as of: May 12, 2017  Content Version: 11.7  © 6876-2422 [employer]. Care instructions adapted under license by [employer] (which disclaims liability or warranty for this information).  If you have questions about a medical condition or this instruction, always ask your healthcare professional. [employer] disclaims any warranty or liability for your use of this information.

## 2018-09-10 NOTE — PROGRESS NOTES
Patient: Tiesha Carbajal MRN: [id number]  SSN: [id number]    YOB: 1970  Age: 49 y.o. Sex: female      Date of Service: 9/10/2018   Provider: CHRISSY Venegas   Office Location:   [facility] [address], [address]  Office Phone: [phone]  Office Fax: [phone]        REASON FOR VISIT:   Chief Complaint   Patient presents with    Cough     1 week        VITALS:   Visit Vitals    /74 (BP 1 Location: Right arm, BP Patient Position: Sitting)  Comment: manual    Pulse 71    Temp 98.7 °F (37.1 °C) (Oral)    Resp 12    Ht 5' 2\" (1.575 m)    Wt 278 lb 9.6 oz (126.4 kg)    LMP 09/08/2018    SpO2 99%    BMI 50.96 kg/m2       MEDICATIONS:   Current Outpatient Prescriptions   Medication Sig Dispense Refill    Cholecalciferol, Vitamin D3, (VITAMIN D3) 2,000 unit cap capsule Take 2,000 Units by mouth daily. 30 Cap 2    lisinopril-hydroCHLOROthiazide (PRINZIDE, ZESTORETIC) 20-25 mg per tablet TAKE 1 TABLET BY MOUTH DAILY 90 Tab 1    metoprolol tartrate (LOPRESSOR) 100 mg IR tablet Take 1 Tab by mouth daily. 90 Tab 1    ibuprofen (MOTRIN) 800 mg tablet TAKE 1 TABLET BY MOUTH EVERY 8 HOURS AS NEEDED FOR PAIN 40 Tab 0        ALLERGIES:   No Known Allergies     ACTIVE MEDICAL PROBLEMS:  Patient Active Problem List   Diagnosis Code    Obesity, morbid ([facility].) E66.01    Essential hypertension I10          HISTORY OF PRESENT ILLNESS:   Tiesha Carbajal is a 49 y.o. female who presents to the office for acute care. Here today with complaints of progressively worsening cough and sinus pressure x 1 week. Patient reports symptoms started as a sore throat but quickly progressed to cough an congestion. Cough is mostly dry, but in the mornings is productive of thick yellowish sputum. It seems to be getting worse, and is keeping her awake all night. She also notes b/l maxillary sinus pressure and nasal congestion.  She has a history of sinus infections and this feels similar. Using OTC cough suppressants without much relief. Denies fevers, chills, ear pain, chest pain, SOB, wheezing, n/v/d. REVIEW OF SYSTEMS:  ROS as noted in HPI     PHYSICAL EXAMINATION:  Physical Exam   Constitutional: She is oriented to person, place, and time and well-developed, well-nourished, and in no distress. HENT:   Head: Normocephalic and atraumatic. Right Ear: Tympanic membrane is not erythematous, not retracted and not bulging. A middle ear effusion is present. Left Ear: Tympanic membrane is not erythematous, not retracted and not bulging. A middle ear effusion is present. Nose: Mucosal edema present. Right sinus exhibits no maxillary sinus tenderness and no frontal sinus tenderness. Left sinus exhibits no maxillary sinus tenderness and no frontal sinus tenderness. Mouth/Throat: Uvula is midline and mucous membranes are normal. No posterior oropharyngeal erythema. pharyngeal cobblestoning and post nasal drip noted   Cardiovascular: Normal rate, regular rhythm and normal heart sounds. Exam reveals no gallop and no friction rub. No murmur heard. Pulmonary/Chest: Effort normal and breath sounds normal. She has no wheezes. She has no rales. Neurological: She is alert and oriented to person, place, and time. Gait normal.   Skin: Skin is warm and dry. No rash noted. Psychiatric: Mood, memory and affect normal.        RESULTS:  No results found for this visit on 09/10/18. ASSESSMENT/PLAN:  Diagnoses and all orders for this visit:    1. Acute bacterial sinusitis  - Given severity of symptoms and relative lack of improvement with appropriate supportive care, will trial antibiotics as detailed below   - Continue supportive care with fluids, rest, OTC meds   Orders:    -     amoxicillin 500 mg tab; Take 500 mg by mouth three (3) times daily for 10 days.     2. Cough  - Tussionex PRN at bedtime  Orders:    -     [facility] 10-8 mg/5 mL suspension; Take 5 mL by mouth every twelve (12) hours as needed for Cough. Max Daily Amount: 10 mL. All questions answered. Patient expresses understanding and agrees with the plan as detailed above. Follow-up Disposition:  Return for f/u as scheduled.        PATIENT CARE TEAM:   Patient Care Team:  CHRISSY Bassett as PCP - General (Physician Assistant)       CHRISSY Bassett   September 10, 2018   11:02 AM

## 2018-09-10 NOTE — PROGRESS NOTES
Chief Complaint   Patient presents with    Cough     1 week     Patient is not fasting. Patient in room # 7.     1. Have you been to the ER, urgent care clinic since your last visit? Hospitalized since your last visit? No    2. Have you seen or consulted any other health care providers outside of the [address] [place] [address] since your last visit? Include any pap smears or colon screening. No     Reviewed. Flu declined.

## 2018-09-10 NOTE — MR AVS SNAPSHOT
[address] [facility] 
[phone] Patient: Keyur Baca MRN: [id number] :1970 Visit Information Date & Time Provider Department Dept. Phone Encounter #  
 9/10/2018  4:30 PM Yobany Gooden, [address] [facility] [id number] Follow-up Instructions Return for as scheduled. Your Appointments 2018  5:00 PM  
FOLLOW UP EXAM with CHRISSY Sin  [facility] ([facility]) Appt Note: [address] [facility] 12 F/U; pt r/s from 18 [address] [place], [place] [address] Upcoming Health Maintenance Date Due DTaP/Tdap/Td series (1 - Tdap) 2018 Influenza Age 5 to Adult 2018 PAP AKA CERVICAL CYTOLOGY 10/28/2019 Allergies as of 9/10/2018  Review Complete On: 9/10/2018 By: CHRISSY Sin No Known Allergies Current Immunizations  Never Reviewed Name Date Influenza Vaccine 2017 Td, Adsorbed 6/10/2018 11:38 PM  
  
 Not reviewed this visit You Were Diagnosed With   
  
 Codes Comments Acute bacterial sinusitis    -  Primary ICD-10-CM: J01.90, B96.89 
ICD-9-CM: 461.9 Cough     ICD-10-CM: R05 ICD-9-CM: 092. 2 Dysmenorrhea     ICD-10-CM: N94.6 ICD-9-CM: 521. 3 Vitals BP Pulse Temp Resp Height(growth percentile) Weight(growth percentile) 120/74 (BP 1 Location: Right arm, BP Patient Position: Sitting) 71 98.7 °F (37.1 °C) (Oral) 12 5' 2\" (1.575 m) 278 lb 9.6 oz (126.4 kg) LMP SpO2 BMI OB Status Smoking Status 2018 99% 50.96 kg/m2 Perimenopausal Never Smoker Vitals History BMI and BSA Data Body Mass Index Body Surface Area 50.96 kg/m 2 2.35 m 2 Preferred Pharmacy Pharmacy Name Phone [facility] [address] [place] [address] [phone] Your Updated Medication List  
  
   
This list is accurate as of 9/10/18  5:00 PM.  Always use your most recent med list.  
  
  
  
  
 amoxicillin 500 mg Tab Take 500 mg by mouth three (3) times daily for 10 days. chlorpheniramine-HYDROcodone 10-8 mg/5 mL suspension Commonly known as:  Jerie Both ER Take 5 mL by mouth every twelve (12) hours as needed for Cough. Max Daily Amount: 10 mL. Cholecalciferol (Vitamin D3) 2,000 unit Cap capsule Commonly known as:  VITAMIN D3 Take 2,000 Units by mouth daily. ibuprofen 800 mg tablet Commonly known as:  MOTRIN  
TAKE 1 TABLET BY MOUTH EVERY 8 HOURS AS NEEDED FOR PAIN  
  
 lisinopril-hydroCHLOROthiazide 20-25 mg per tablet Commonly known as:  PRINZIDE, ZESTORETIC  
TAKE 1 TABLET BY MOUTH DAILY  
  
 metoprolol tartrate 100 mg IR tablet Commonly known as:  LOPRESSOR Take 1 Tab by mouth daily. Prescriptions Printed Refills  
 chlorpheniramine-HYDROcodone (TUSSIONEX PENNKINETIC ER) 10-8 mg/5 mL suspension 0 Sig: Take 5 mL by mouth every twelve (12) hours as needed for Cough. Max Daily Amount: 10 mL. Class: Print Route: Oral  
  
Prescriptions Sent to Pharmacy Refills  
 amoxicillin 500 mg tab 0 Sig: Take 500 mg by mouth three (3) times daily for 10 days. Class: Normal  
 Pharmacy: [facility] [address] [place] [address] Ph #: [phone] Route: Oral  
 ibuprofen (MOTRIN) 800 mg tablet 0 Sig: TAKE 1 TABLET BY MOUTH EVERY 8 HOURS AS NEEDED FOR PAIN Class: Normal  
 Pharmacy: [facility] [address] [place] [address] Ph #: [phone] Follow-up Instructions Return for as scheduled. Patient Instructions Cough: Care Instructions Your Care Instructions A cough is your body's response to something that bothers your throat or airways. Many things can cause a cough. You might cough because of a cold or the flu, bronchitis, or asthma. Smoking, postnasal drip, allergies, and stomach acid that backs up into your throat also can cause coughs. A cough is a symptom, not a disease. Most coughs stop when the cause, such as a cold, goes away. You can take a few steps at home to cough less and feel better. Follow-up care is a key part of your treatment and safety. Be sure to make and go to all appointments, and call your doctor if you are having problems. It's also a good idea to know your test results and keep a list of the medicines you take. How can you care for yourself at home? · Drink lots of water and other fluids. This helps thin the mucus and soothes a dry or sore throat. Honey or lemon juice in hot water or tea may ease a dry cough. · Take cough medicine as directed by your doctor. · Prop up your head on pillows to help you breathe and ease a dry cough. · Try cough drops to soothe a dry or sore throat. Cough drops don't stop a cough. Medicine-flavored cough drops are no better than candy-flavored drops or hard candy. · Do not smoke. Avoid secondhand smoke. If you need help quitting, talk to your doctor about stop-smoking programs and medicines. These can increase your chances of quitting for good. When should you call for help? Call 911 anytime you think you may need emergency care.  For example, call if: 
  · You have severe trouble breathing.  
 Call your doctor now or seek immediate medical care if: 
  · You cough up blood.  
  · You have new or worse trouble breathing.  
  · You have a new or higher fever.  
  · You have a new rash.  
 Watch closely for changes in your health, and be sure to contact your doctor if: 
  · You cough more deeply or more often, especially if you notice more mucus or a change in the color of your mucus.  
  · You have new symptoms, such as a sore throat, an earache, or sinus pain.  
  · You do not get better as expected. Where can you learn more? Go to http://ivan-joshua.info/. Enter D279 in the search box to learn more about \"Cough: Care Instructions. \" Current as of: December 6, 2017 Content Version: 11.7 © 2722-4443 [employer]. Care instructions adapted under license by [employer] (which disclaims liability or warranty for this information). If you have questions about a medical condition or this instruction, always ask your healthcare professional. [place] [address] any warranty or liability for your use of this information. Sinusitis: Care Instructions Your Care Instructions Sinusitis is an infection of the lining of the sinus cavities in your head. Sinusitis often follows a cold. It causes pain and pressure in your head and face. In most cases, sinusitis gets better on its own in 1 to 2 weeks. But some mild symptoms may last for several weeks. Sometimes antibiotics are needed. Follow-up care is a key part of your treatment and safety. Be sure to make and go to all appointments, and call your doctor if you are having problems. It's also a good idea to know your test results and keep a list of the medicines you take. How can you care for yourself at home? · Take an over-the-counter pain medicine, such as acetaminophen (Tylenol), ibuprofen (Advil, Motrin), or naproxen (Aleve). Read and follow all instructions on the label. · If the doctor prescribed antibiotics, take them as directed. Do not stop taking them just because you feel better. You need to take the full course of antibiotics. · Be careful when taking over-the-counter cold or flu medicines and Tylenol at the same time.  Many of these medicines have acetaminophen, which is Tylenol. Read the labels to make sure that you are not taking more than the recommended dose. Too much acetaminophen (Tylenol) can be harmful. · Breathe warm, moist air from a steamy shower, a hot bath, or a sink filled with hot water. Avoid cold, dry air. Using a humidifier in your home may help. Follow the directions for cleaning the machine. · Use saline (saltwater) nasal washes to help keep your nasal passages open and wash out mucus and bacteria. You can buy saline nose drops at a grocery store or [facility]. Or you can make your own at home by adding 1 teaspoon of salt and 1 teaspoon of baking soda to 2 cups of distilled water. If you make your own, fill a bulb syringe with the solution, insert the tip into your nostril, and squeeze gently. Lorrin Fraction your nose. · Put a hot, wet towel or a warm gel pack on your face 3 or 4 times a day for 5 to 10 minutes each time. · Try a decongestant nasal spray like oxymetazoline (Afrin). Do not use it for more than 3 days in a row. Using it for more than 3 days can make your congestion worse. When should you call for help? Call your doctor now or seek immediate medical care if: 
  · You have new or worse swelling or redness in your face or around your eyes.  
  · You have a new or higher fever.  
 Watch closely for changes in your health, and be sure to contact your doctor if: 
  · You have new or worse facial pain.  
  · The mucus from your nose becomes thicker (like pus) or has new blood in it.  
  · You are not getting better as expected. Where can you learn more? Go to http://ivan-joshua.info/. Enter J754 in the search box to learn more about \"Sinusitis: Care Instructions. \" Current as of: May 12, 2017 Content Version: 11.7 © 1125-4172 [employer].  Care instructions adapted under license by [employer] (which disclaims liability or warranty for this information). If you have questions about a medical condition or this instruction, always ask your healthcare professional. [address] any warranty or liability for your use of this information. Introducing [facility] SERVICES! [employer] introduces DoubleDutch patient portal. Now you can access parts of your medical record, email your doctor's office, and request medication refills online. 1. In your internet browser, go to [email or web address] 2. Click on the First Time User? Click Here link in the Sign In box. You will see the New Member Sign Up page. 3. Enter your DoubleDutch Access Code exactly as it appears below. You will not need to use this code after youve completed the sign-up process. If you do not sign up before the expiration date, you must request a new code. · DoubleDutch Access Code: [email or web address] Expires: 12/9/2018  5:00 PM 
 
4. Enter the last four digits of your Social Security Number (xxxx) and Date of Birth (mm/dd/yyyy) as indicated and click Submit. You will be taken to the next sign-up page. 5. Create a DoubleDutch ID. This will be your DoubleDutch login ID and cannot be changed, so think of one that is secure and easy to remember. 6. Create a DoubleDutch password. You can change your password at any time. 7. Enter your Password Reset Question and Answer. This can be used at a later time if you forget your password. 8. Enter your e-mail address. You will receive e-mail notification when new information is available in [address]. 9. Click Sign Up. You can now view and download portions of your medical record. 10. Click the Download Summary menu link to download a portable copy of your medical information. If you have questions, please visit the Frequently Asked Questions section of the DoubleDutch website. Remember, DoubleDutch is NOT to be used for urgent needs. For medical emergencies, dial 911. Now available from your iPhone and Android! Please provide this summary of care documentation to your next provider. Your primary care clinician is listed as Tata Castellon. If you have any questions after today's visit, please call [phone].

## 2018-09-20 ENCOUNTER — OFFICE VISIT (OUTPATIENT)
Dept: FAMILY MEDICINE CLINIC | Age: 48
End: 2018-09-20

## 2018-09-20 VITALS
SYSTOLIC BLOOD PRESSURE: 114 MMHG | WEIGHT: 272.8 LBS | HEART RATE: 66 BPM | BODY MASS INDEX: 50.2 KG/M2 | RESPIRATION RATE: 16 BRPM | DIASTOLIC BLOOD PRESSURE: 70 MMHG | OXYGEN SATURATION: 99 % | TEMPERATURE: 98.7 F | HEIGHT: 62 IN

## 2018-09-20 DIAGNOSIS — R05.9 COUGH: ICD-10-CM

## 2018-09-20 DIAGNOSIS — J02.9 SORE THROAT: Primary | ICD-10-CM

## 2018-09-20 LAB
QUICKVUE INFLUENZA TEST: NEGATIVE
S PYO AG THROAT QL: POSITIVE
VALID INTERNAL CONTROL?: YES
VALID INTERNAL CONTROL?: YES

## 2018-09-20 RX ORDER — AMOXICILLIN AND CLAVULANATE POTASSIUM 875; 125 MG/1; MG/1
1 TABLET, FILM COATED ORAL 2 TIMES DAILY
Qty: 20 TAB | Refills: 0 | Status: SHIPPED | OUTPATIENT
Start: 2018-09-20 | End: 2018-09-30

## 2018-09-20 NOTE — PROGRESS NOTES
HISTORY OF PRESENT ILLNESS  Omar Simental is a 49 y.o. female. HPI: Lamond Negus patient. Here with c/o sore throat, fever, cough mainly dry and night time, nausea, abdomina pain, diarrehea since yesterday. Works in [employer] . No h/o copd or asthma. Wheezing at night time. Sitting comfortable. No wheezing. Able to talk in full sentence. Vitals stable. No chest pain or sob. Visit Vitals    /70 (BP 1 Location: Left arm, BP Patient Position: Sitting)    Pulse 66    Temp 98.7 °F (37.1 °C) (Oral)    Resp 16    Ht 5' 2\" (1.575 m)    Wt 272 lb 12.8 oz (123.7 kg)    SpO2 99%    BMI 49.9 kg/m2     Review medication list, vitals, problem list,allergies. ROS: see HPI     Physical Exam   Constitutional: She is oriented to person, place, and time. No distress. HENT:   Throat: generalize erythema, no tonsillar enlargement or exudate  Neck: no palpable lymph nodes    Cardiovascular: Normal heart sounds. Pulmonary/Chest: No respiratory distress. She has no wheezes. Abdominal: Soft. There is no tenderness. Musculoskeletal: She exhibits no edema. Neurological: She is oriented to person, place, and time. ASSESSMENT and PLAN    ICD-10-CM ICD-9-CM    1. Sore throat: rapid strep is positive. Negative influenza test. Has diarrhea and stomach cramping. Discuss to take probiotic. Drink more fluid. Take motrin or tylenol with food as needed for pain. Given augmentin to take it with food and probiotic. Discussed medication side effects. Salt water gargle for sore throat as needed. J02.9 462 AMB POC RAPID STREP A      amoxicillin-clavulanate (AUGMENTIN) 875-125 mg per tablet   2. Cough R05 786.2 AMB POC RAPID INFLUENZA TEST   Pt understood and agree with the plan   Follow-up Disposition:  Return in about 2 weeks (around 10/4/2018), or if symptoms worsen or fail to improve.

## 2018-09-20 NOTE — MR AVS SNAPSHOT
[address] [facility] [address] [facility] [address] 
[phone] Patient: Charlie Good MRN: [id number] :1970 Visit Information Date & Time Provider Department Dept. Phone Encounter #  
 2018 10:15 AM Imtiaz Nickerson,  [facility] [phone] [id number] Follow-up Instructions Return in about 2 weeks (around 10/4/2018), or if symptoms worsen or fail to improve. Your Appointments 10/2/2018  4:30 PM  
FOLLOW UP EXAM with CHRISSY Larkin  [facility] ([address]) Appt Note: [address] F/U; pt r/s from 18; =  
 [address] [facility] [address] [facility] [address]  
[address] [facility] 200 [facility] Upcoming Health Maintenance Date Due DTaP/Tdap/Td series (1 - Tdap) 2018 Influenza Age 5 to Adult 2018 PAP AKA CERVICAL CYTOLOGY 10/28/2019 Allergies as of 2018  Review Complete On: 2018 By: Imtiaz Nickerson MD  
 No Known Allergies Current Immunizations  Never Reviewed Name Date Influenza Vaccine 2017 Td, Adsorbed 6/10/2018 11:38 PM  
  
 Not reviewed this visit You Were Diagnosed With   
  
 Codes Comments Sore throat    -  Primary ICD-10-CM: J02.9 ICD-9-CM: 347 Cough     ICD-10-CM: R05 ICD-9-CM: 505. 2 Vitals BP Pulse Temp Resp Height(growth percentile) Weight(growth percentile) 114/70 (BP 1 Location: Left arm, BP Patient Position: Sitting) 66 98.7 °F (37.1 °C) (Oral) 16 5' 2\" (1.575 m) 272 lb 12.8 oz (123.7 kg) LMP SpO2 BMI OB Status Smoking Status 2018 99% 49.9 kg/m2 Perimenopausal Never Smoker Vitals History BMI and BSA Data Body Mass Index Body Surface Area  
 49.9 kg/m 2 2.33 m 2 Preferred Pharmacy Pharmacy Name Phone [facility] [address] [place] [address] [phone] Your Updated Medication List  
  
   
This list is accurate as of 9/20/18 11:04 AM.  Always use your most recent med list.  
  
  
  
  
 amoxicillin-clavulanate 875-125 mg per tablet Commonly known as:  AUGMENTIN Take 1 Tab by mouth two (2) times a day for 10 days. chlorpheniramine-HYDROcodone 10-8 mg/5 mL suspension Commonly known as:  Vicci Shoe ER Take 5 mL by mouth every twelve (12) hours as needed for Cough. Max Daily Amount: 10 mL. Cholecalciferol (Vitamin D3) 2,000 unit Cap capsule Commonly known as:  VITAMIN D3 Take 2,000 Units by mouth daily. ibuprofen 800 mg tablet Commonly known as:  MOTRIN  
TAKE 1 TABLET BY MOUTH EVERY 8 HOURS AS NEEDED FOR PAIN  
  
 lisinopril-hydroCHLOROthiazide 20-25 mg per tablet Commonly known as:  PRINZIDE, ZESTORETIC  
TAKE 1 TABLET BY MOUTH DAILY  
  
 metoprolol tartrate 100 mg IR tablet Commonly known as:  LOPRESSOR Take 1 Tab by mouth daily. Prescriptions Sent to Pharmacy Refills  
 amoxicillin-clavulanate (AUGMENTIN) 875-125 mg per tablet 0 Sig: Take 1 Tab by mouth two (2) times a day for 10 days. Class: Normal  
 Pharmacy: [facility] [address] [place] [address] #: [phone] Route: Oral  
  
We Performed the Following AMB POC RAPID INFLUENZA TEST [31621 CPT(R)] AMB POC RAPID STREP A [63133 CPT(R)] Follow-up Instructions Return in about 2 weeks (around 10/4/2018), or if symptoms worsen or fail to improve. Patient Instructions Sore Throat: Care Instructions Your Care Instructions Infection by bacteria or a virus causes most sore throats. Cigarette smoke, dry air, air pollution, allergies, and yelling can also cause a sore throat. Sore throats can be painful and annoying.  Fortunately, most sore throats go away on their own. If you have a bacterial infection, your doctor may prescribe antibiotics. Follow-up care is a key part of your treatment and safety. Be sure to make and go to all appointments, and call your doctor if you are having problems. It's also a good idea to know your test results and keep a list of the medicines you take. How can you care for yourself at home? · If your doctor prescribed antibiotics, take them as directed. Do not stop taking them just because you feel better. You need to take the full course of antibiotics. · Gargle with warm salt water once an hour to help reduce swelling and relieve discomfort. Use 1 teaspoon of salt mixed in 1 cup of warm water. · Take an over-the-counter pain medicine, such as acetaminophen (Tylenol), ibuprofen (Advil, Motrin), or naproxen (Aleve). Read and follow all instructions on the label. · Be careful when taking over-the-counter cold or flu medicines and Tylenol at the same time. Many of these medicines have acetaminophen, which is Tylenol. Read the labels to make sure that you are not taking more than the recommended dose. Too much acetaminophen (Tylenol) can be harmful. · Drink plenty of fluids. Fluids may help soothe an irritated throat. Hot fluids, such as tea or soup, may help decrease throat pain. · Use over-the-counter throat lozenges to soothe pain. Regular cough drops or hard candy may also help. These should not be given to young children because of the risk of choking. · Do not smoke or allow others to smoke around you. If you need help quitting, talk to your doctor about stop-smoking programs and medicines. These can increase your chances of quitting for good. · Use a vaporizer or humidifier to add moisture to your bedroom. Follow the directions for cleaning the machine. When should you call for help? Call your doctor now or seek immediate medical care if: 
  · You have new or worse trouble swallowing.   · Your sore throat gets much worse on one side.  
 Watch closely for changes in your health, and be sure to contact your doctor if you do not get better as expected. Where can you learn more? Go to http://ivan-joshua.info/. Enter [phone] [id number] [phone] in the search box to learn more about \"Sore Throat: Care Instructions. \" Current as of: May 12, 2017 Content Version: 11.7 © 3443-0973 [employer]. Care instructions adapted under license by [employer] (which disclaims liability or warranty for this information). If you have questions about a medical condition or this instruction, always ask your healthcare professional. [address] any warranty or liability for your use of this information. Introducing [facility]! Qiana Singh introduces Becual patient portal. Now you can access parts of your medical record, email your doctor's office, and request medication refills online. 1. In your internet browser, go to [email or web address] 2. Click on the First Time User? Click Here link in the Sign In box. You will see the New Member Sign Up page. 3. Enter your Becual Access Code exactly as it appears below. You will not need to use this code after youve completed the sign-up process. If you do not sign up before the expiration date, you must request a new code. · Becual Access Code: [email or web address] Expires: 12/9/2018  5:00 PM 
 
4. Enter the last four digits of your Social Security Number (xxxx) and Date of Birth (mm/dd/yyyy) as indicated and click Submit. You will be taken to the next sign-up page. 5. Create a [employer] ID. This will be your Becual login ID and cannot be changed, so think of one that is secure and easy to remember. 6. Create a Becual password. You can change your password at any time. 7. Enter your Password Reset Question and Answer. This can be used at a later time if you forget your password. 8. Enter your e-mail address. You will receive e-mail notification when new information is available in [address] 9. Click Sign Up. You can now view and download portions of your medical record. 10. Click the Download Summary menu link to download a portable copy of your medical information. If you have questions, please visit the Frequently Asked Questions section of the [employer] website. Remember, [employer] is NOT to be used for urgent needs. For medical emergencies, dial 911. Now available from your iPhone and Android! Please provide this summary of care documentation to your next provider. Your primary care clinician is listed as Savannah Win. If you have any questions after today's visit, please call [phone].

## 2018-09-20 NOTE — LETTER
NOTIFICATION RETURN TO WORK / SCHOOL 
 
9/20/2018 11:07 AM 
 
Ms. Hina Meraz [id number] 
[facility] [address] To Whom It May Concern: 
 
Hina Meraz is currently under the care of [address] [facility]. She will return to work/school on: 09/24/2018 (patient needs to have medication for 24 hours prior to returning to work) If there are questions or concerns please have the patient contact our office. Sincerely, Aris Strickland MD

## 2018-09-20 NOTE — LETTER
NOTIFICATION RETURN TO WORK / SCHOOL 
 
9/20/2018 11:03 AM 
 
Ms. Angelia Mcclelland [address] [facility] [address] To Whom It May Concern: 
 
Angelia Mcclelland is currently under the care of [address] [facility]. She will return to work/school on: 24 hours from 11:30am 9/20/18 If there are questions or concerns please have the patient contact our office. Sincerely, Domo Mahajan MD

## 2018-09-20 NOTE — PROGRESS NOTES
1. Have you been to the ER, urgent care clinic since your last visit? Hospitalized since your last visit? No    2. Have you seen or consulted any other health care providers outside of the [address] [place] [address] since your last visit? Include any pap smears or colon screening.  No    Last flu vaccine 9/01/17

## 2018-09-20 NOTE — PATIENT INSTRUCTIONS
Sore Throat: Care Instructions  Your Care Instructions    Infection by bacteria or a virus causes most sore throats. Cigarette smoke, dry air, air pollution, allergies, and yelling can also cause a sore throat. Sore throats can be painful and annoying. Fortunately, most sore throats go away on their own. If you have a bacterial infection, your doctor may prescribe antibiotics. Follow-up care is a key part of your treatment and safety. Be sure to make and go to all appointments, and call your doctor if you are having problems. It's also a good idea to know your test results and keep a list of the medicines you take. How can you care for yourself at home? · If your doctor prescribed antibiotics, take them as directed. Do not stop taking them just because you feel better. You need to take the full course of antibiotics. · Gargle with warm salt water once an hour to help reduce swelling and relieve discomfort. Use 1 teaspoon of salt mixed in 1 cup of warm water. · Take an over-the-counter pain medicine, such as acetaminophen (Tylenol), ibuprofen (Advil, Motrin), or naproxen (Aleve). Read and follow all instructions on the label. · Be careful when taking over-the-counter cold or flu medicines and Tylenol at the same time. Many of these medicines have acetaminophen, which is Tylenol. Read the labels to make sure that you are not taking more than the recommended dose. Too much acetaminophen (Tylenol) can be harmful. · Drink plenty of fluids. Fluids may help soothe an irritated throat. Hot fluids, such as tea or soup, may help decrease throat pain. · Use over-the-counter throat lozenges to soothe pain. Regular cough drops or hard candy may also help. These should not be given to young children because of the risk of choking. · Do not smoke or allow others to smoke around you. If you need help quitting, talk to your doctor about stop-smoking programs and medicines.  These can increase your chances of quitting for good. · Use a vaporizer or humidifier to add moisture to your bedroom. Follow the directions for cleaning the machine. When should you call for help? Call your doctor now or seek immediate medical care if:    · You have new or worse trouble swallowing.     · Your sore throat gets much worse on one side.    Watch closely for changes in your health, and be sure to contact your doctor if you do not get better as expected. Where can you learn more? Go to http://ivan-joshua.info/. Enter [phone] [id number] [phone] in the search box to learn more about \"Sore Throat: Care Instructions. \"  Current as of: May 12, 2017  Content Version: 11.7  © 6534-5940 [employer]. Care instructions adapted under license by [employer] (which disclaims liability or warranty for this information). If you have questions about a medical condition or this instruction, always ask your healthcare professional. [address] any warranty or liability for your use of this information.

## 2018-10-22 ENCOUNTER — TELEPHONE (OUTPATIENT)
Dept: FAMILY MEDICINE CLINIC | Age: 48
End: 2018-10-22

## 2018-10-22 DIAGNOSIS — I10 ESSENTIAL HYPERTENSION: ICD-10-CM

## 2018-10-22 RX ORDER — METOPROLOL TARTRATE 100 MG/1
100 TABLET ORAL DAILY
Qty: 30 TAB | Refills: 0 | Status: SHIPPED | OUTPATIENT
Start: 2018-10-22 | End: 2018-10-29 | Stop reason: SDUPTHER

## 2018-10-23 NOTE — TELEPHONE ENCOUNTER
Called home number. No message left. No answering machine. This call was concerning message below per Regina LOWE appointment needed within next few weeks.

## 2018-10-23 NOTE — TELEPHONE ENCOUNTER
Called home number. No message left. Mailbox full. This call was concerning message below appointment needed within next few weeks.

## 2018-10-29 ENCOUNTER — OFFICE VISIT (OUTPATIENT)
Dept: FAMILY MEDICINE CLINIC | Age: 48
End: 2018-10-29

## 2018-10-29 VITALS
WEIGHT: 272.4 LBS | SYSTOLIC BLOOD PRESSURE: 116 MMHG | OXYGEN SATURATION: 96 % | DIASTOLIC BLOOD PRESSURE: 70 MMHG | TEMPERATURE: 98.4 F | BODY MASS INDEX: 50.13 KG/M2 | HEIGHT: 62 IN | RESPIRATION RATE: 12 BRPM | HEART RATE: 74 BPM

## 2018-10-29 DIAGNOSIS — R73.03 PREDIABETES: ICD-10-CM

## 2018-10-29 DIAGNOSIS — E66.01 MORBID OBESITY WITH BMI OF 45.0-49.9, ADULT (HCC): ICD-10-CM

## 2018-10-29 DIAGNOSIS — E55.9 VITAMIN D DEFICIENCY: ICD-10-CM

## 2018-10-29 DIAGNOSIS — Z20.7 EXPOSURE TO SCABIES: ICD-10-CM

## 2018-10-29 DIAGNOSIS — Z23 ENCOUNTER FOR IMMUNIZATION: ICD-10-CM

## 2018-10-29 DIAGNOSIS — I10 ESSENTIAL HYPERTENSION: Primary | ICD-10-CM

## 2018-10-29 RX ORDER — LISINOPRIL AND HYDROCHLOROTHIAZIDE 20; 25 MG/1; MG/1
TABLET ORAL
Qty: 90 TAB | Refills: 1 | Status: SHIPPED | OUTPATIENT
Start: 2018-10-29 | End: 2019-02-19 | Stop reason: SDUPTHER

## 2018-10-29 RX ORDER — METOPROLOL TARTRATE 100 MG/1
100 TABLET ORAL DAILY
Qty: 90 TAB | Refills: 1 | Status: SHIPPED | OUTPATIENT
Start: 2018-10-29 | End: 2019-02-19 | Stop reason: SDUPTHER

## 2018-10-29 RX ORDER — PERMETHRIN 50 MG/G
CREAM TOPICAL
Qty: 60 G | Refills: 0 | Status: SHIPPED | OUTPATIENT
Start: 2018-10-29 | End: 2019-01-17 | Stop reason: ALTCHOICE

## 2018-10-29 NOTE — PROGRESS NOTES
Patient: Jeanine Valencia MRN: [id number]  SSN: [id number] YOB: 1970  Age: 49 y.o. Sex: female Date of Service: 10/29/2018 Provider: CHRISSY Santiago Office Location:  
[address], [address] [place], [facility] [address] Office Phone: [phone] Office Fax: [phone] REASON FOR VISIT:  
Chief Complaint Patient presents with  Hypertension  Skin Problem  Medication Refill VITALS:  
Visit Vitals /70 (BP 1 Location: Left arm, BP Patient Position: Sitting) Pulse 74 Temp 98.4 °F (36.9 °C) (Oral) Resp 12 Ht 5' 2\" (1.575 m) Wt 272 lb 6.4 oz (123.6 kg) LMP 10/01/2018 SpO2 96% BMI 49.82 kg/m² MEDICATIONS:  
Current Outpatient Medications on File Prior to Visit Medication Sig Dispense Refill  metoprolol tartrate (LOPRESSOR) 100 mg IR tablet Take 1 Tab by mouth daily for 30 days. 30 Tab 0  ibuprofen (MOTRIN) 800 mg tablet TAKE 1 TABLET BY MOUTH EVERY 8 HOURS AS NEEDED FOR PAIN 30 Tab 0  Cholecalciferol, Vitamin D3, (VITAMIN D3) 2,000 unit cap capsule Take 2,000 Units by mouth daily. 30 Cap 2  
 lisinopril-hydroCHLOROthiazide (PRINZIDE, ZESTORETIC) 20-25 mg per tablet TAKE 1 TABLET BY MOUTH DAILY 90 Tab 1  chlorpheniramine-HYDROcodone (TUSSIONEX PENNKINETIC ER) 10-8 mg/5 mL suspension Take 5 mL by mouth every twelve (12) hours as needed for Cough. Max Daily Amount: 10 mL. 115 mL 0 No current facility-administered medications on file prior to visit. ALLERGIES:  
No Known Allergies MEDICAL/SURGICAL HISTORY: 
Past Medical History:  
Diagnosis Date  Acid reflux  Acute cystitis with hematuria  Heel spur  HTN (hypertension), benign 12/2/2009  Irregular menses  Menorrhagia  Plantar fasciitis  Shoulder bursitis  Urticaria  Vulvovaginitis Past Surgical History:  
Procedure Laterality Date  HX CHOLECYSTECTOMY  HX GYN  2003   HX GYN  2005 BTL FAMILY HISTORY: 
Family History Problem Relation Age of Onset  Cancer Mother  Diabetes Mother  Heart Failure Mother  Arthritis-rheumatoid Mother  Stroke Father  Hypertension Father  Substance Abuse Father   
     tobacco use  Alzheimer Father  Cancer Brother  Diabetes Brother  Drug Abuse Brother  Alcohol abuse Maternal Grandfather  Attention Deficit Disorder Daughter  Heart Attack Maternal Aunt SOCIAL HISTORY: 
Social History Tobacco Use  Smoking status: Never Smoker  Smokeless tobacco: Never Used Substance Use Topics  Alcohol use: Yes Comment: rare  Drug use: No  
   
 
 
 
HISTORY OF PRESENT ILLNESS: Norma Arambula is a 49 y.o. female who presents to the office for a routine follow up visit. Hypertension -  
Currently, the patient's condition is well controlled. Reports compliance with the following regimen: metoprolol 100 mg daily, lisinopril-HCTZ 20-25 mg daily Home BP readings: not checking Lifestyle modifications: trying to work on weight loss Prediabetes/Morbid Obesity - 
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 She would like to start by cutting out soda and trying to meal prep some healthier options to eat during the day Scabies Exposure - Patient reports one of her hospice patients was recently diagnosed with scabies. She had contact with the patient before she was informed to use precautions. She now has a solitary itchy bump on her L wrist and is concerned for possible scabies. REVIEW OF SYSTEMS: 
Review of Systems Constitutional: Negative for chills, fever and malaise/fatigue. Respiratory: Negative for cough, shortness of breath and wheezing. Cardiovascular: Negative for chest pain, palpitations and leg swelling. Gastrointestinal: Negative for abdominal pain, nausea and vomiting. PHYSICAL EXAMINATION: 
Physical Exam  
Constitutional: She is oriented to person, place, and time and well-developed, well-nourished, and in no distress. Cardiovascular: Normal rate, regular rhythm and normal heart sounds. Exam reveals no gallop and no friction rub. No murmur heard. Pulmonary/Chest: Effort normal and breath sounds normal. She has no wheezes. She has no rales. Neurological: She is alert and oriented to person, place, and time. Gait normal.  
Skin: Skin is warm and dry. No rash noted. small pink papule L dorsal wrist. No other rashes. Finger web spaces clear Psychiatric: Mood, memory and affect normal.  
  
 
RESULTS: 
No results found for this visit on 10/29/18. ASSESSMENT/PLAN: 
Diagnoses and all orders for this visit: 1. Essential hypertension - Well controlled - Meds refilled Orders:   
-     metoprolol tartrate (LOPRESSOR) 100 mg IR tablet; Take 1 Tab by mouth daily for 180 days. -     lisinopril-hydroCHLOROthiazide (PRINZIDE, ZESTORETIC) 20-25 mg per tablet; TAKE 1 TABLET BY MOUTH DAILY 2. Prediabetes - Counseled on diet and exercise - Patient is going to start by cutting out soda, and trying to pack healthier lunches for herself instead of grabbing fast food on the go  
- Re-check A1c just prior to next visit Orders:   
-     HEMOGLOBIN A1C W/O EAG; Future 3. Morbid obesity with BMI of 45.0-49.9, adult ([facility] [address] - Diet and exercise encouraged 4. Vitamin D deficiency - Now on supplementation 
- Re-check level Orders: -     VITAMIN D, 25 HYDROXY 5. Exposure to scabies 
- Reassured patient I see no clinical evidence of scabies infestation today, however given exposure, will send permethrin as below - Patient may use as prophylaxis or wait and monitor for symptoms at her discretion Orders:   
-     permethrin (ACTICIN) 5 % topical cream; massage into skin from head to toe. rinse off after 8-14 hours. 6. Encounter for immunization - Flu shot administered today with patient's consent. VIS provided. Orders: -     INFLUENZA VIRUS VAC QUAD,SPLIT,PRESV FREE SYRINGE IM Follow-up Disposition: 
Return in about 3 months (around 1/29/2019) for non fasting labs due prior. All questions answered. Patient expresses understanding and agrees with the plan as detailed above. PATIENT CARE TEAM:  
Patient Care Team: CHRISSY Painter as PCP - General (Physician Assistant) CHRISSY Peoples October 30, 2018  
9:41 AM

## 2018-10-29 NOTE — PROGRESS NOTES
Chief Complaint Patient presents with  Hypertension  Skin Problem  Medication Refill Patient is not fasting. Patient in room # 6.  
 
1. Have you been to the ER, urgent care clinic since your last visit? Hospitalized since your last visit? No 
 
2. Have you seen or consulted any other health care providers outside of the [address] [place] [address] since your last visit? Include any pap smears or colon screening. No 
 
HM Reviewed. Verbal order for in office flu injection per CHRISSY Ding/Chey Carmona LPN Hero Garcia is a 49 y.o. female who presents for routine immunizations. She denies any symptoms , reactions or allergies that would exclude them from being immunized today. Risks and adverse reactions were discussed and the VIS was given to them. All questions were addressed. She was observed for 10 min post injection. There were no reactions observed.  
 
Sandra Vale LPN

## 2018-10-29 NOTE — PATIENT INSTRUCTIONS
A Healthy Lifestyle: Care Instructions Your Care Instructions A healthy lifestyle can help you feel good, stay at a healthy weight, and have plenty of energy for both work and play. A healthy lifestyle is something you can share with your whole family. A healthy lifestyle also can lower your risk for serious health problems, such as high blood pressure, heart disease, and diabetes. You can follow a few steps listed below to improve your health and the health of your family. Follow-up care is a key part of your treatment and safety. Be sure to make and go to all appointments, and call your doctor if you are having problems. It's also a good idea to know your test results and keep a list of the medicines you take. How can you care for yourself at home? · Do not eat too much sugar, fat, or fast foods. You can still have dessert and treats now and then. The goal is moderation. · Start small to improve your eating habits. Pay attention to portion sizes, drink less juice and soda pop, and eat more fruits and vegetables. ? Eat a healthy amount of food. A 3-ounce serving of meat, for example, is about the size of a deck of cards. Fill the rest of your plate with vegetables and whole grains. ? Limit the amount of soda and sports drinks you have every day. Drink more water when you are thirsty. ? Eat at least 5 servings of fruits and vegetables every day. It may seem like a lot, but it is not hard to reach this goal. A serving or helping is 1 piece of fruit, 1 cup of vegetables, or 2 cups of leafy, raw vegetables. Have an apple or some carrot sticks as an afternoon snack instead of a candy bar. Try to have fruits and/or vegetables at every meal. 
· Make exercise part of your daily routine. You may want to start with simple activities, such as walking, bicycling, or slow swimming. Try to be active 30 to 60 minutes every day.  You do not need to do all 30 to 60 minutes all at once. For example, you can exercise 3 times a day for 10 or 20 minutes. Moderate exercise is safe for most people, but it is always a good idea to talk to your doctor before starting an exercise program. 
· Keep moving. Lucero Crewsers the lawn, work in the garden, or Mashed jobs. Take the stairs instead of the elevator at work. · If you smoke, quit. People who smoke have an increased risk for heart attack, stroke, cancer, and other lung illnesses. Quitting is hard, but there are ways to boost your chance of quitting tobacco for good. ? Use nicotine gum, patches, or lozenges. ? Ask your doctor about stop-smoking programs and medicines. ? Keep trying. In addition to reducing your risk of diseases in the future, you will notice some benefits soon after you stop using tobacco. If you have shortness of breath or asthma symptoms, they will likely get better within a few weeks after you quit. · Limit how much alcohol you drink. Moderate amounts of alcohol (up to 2 drinks a day for men, 1 drink a day for women) are okay. But drinking too much can lead to liver problems, high blood pressure, and other health problems. Family health If you have a family, there are many things you can do together to improve your health. · Eat meals together as a family as often as possible. · Eat healthy foods. This includes fruits, vegetables, lean meats and dairy, and whole grains. · Include your family in your fitness plan. Most people think of activities such as jogging or tennis as the way to fitness, but there are many ways you and your family can be more active. Anything that makes you breathe hard and gets your heart pumping is exercise. Here are some tips: 
? Walk to do errands or to take your child to school or the bus. 
? Go for a family bike ride after dinner instead of watching TV. Where can you learn more? Go to http://ivan-joshua.info/. Enter D554 in the search box to learn more about \"A Healthy Lifestyle: Care Instructions. \" Current as of: December 7, 2017 Content Version: 11.8 © 8414-3431 [employer]. Care instructions adapted under license by [employer] (which disclaims liability or warranty for this information). If you have questions about a medical condition or this instruction, always ask your healthcare professional. [place] [address] any warranty or liability for your use of this information. Vaccine Information Statement Influenza (Flu) Vaccine (Inactivated or Recombinant): What you need to know Many Vaccine Information Statements are available in [place] and other languages. See www.immunize.org/vis Hojas de Información Sobre Vacunas están disponibles en Español y en muchos otros idiomas. Visite www.immunize.org/vis 1. Why get vaccinated? Influenza (flu) is a contagious disease that spreads around the [facility] every year, usually between October and May. Flu is caused by influenza viruses, and is spread mainly by coughing, sneezing, and close contact. Anyone can get flu. Flu strikes suddenly and can last several days. Symptoms vary by age, but can include: 
 fever/chills  sore throat  muscle aches  fatigue  cough  headache  runny or stuffy nose Flu can also lead to pneumonia and blood infections, and cause diarrhea and seizures in children. If you have a medical condition, such as heart or lung disease, flu can make it worse. Flu is more dangerous for some people. Infants and young children, people 72years of age and older, pregnant women, and people with certain health conditions or a weakened immune system are at greatest risk. Each year thousands of people in the [facility] die from flu, and many more are hospitalized.   
 
Flu vaccine can: 
 keep you from getting flu, 
 make flu less severe if you do get it, and 
  keep you from spreading flu to your family and other people. 2. Inactivated and recombinant flu vaccines A dose of flu vaccine is recommended every flu season. Children 6 months through 6years of age may need two doses during the same flu season. Everyone else needs only one dose each flu season. Some inactivated flu vaccines contain a very small amount of a mercury-based preservative called thimerosal. Studies have not shown thimerosal in vaccines to be harmful, but flu vaccines that do not contain thimerosal are available. There is no live flu virus in flu shots. They cannot cause the flu. There are many flu viruses, and they are always changing. Each year a new flu vaccine is made to protect against three or four viruses that are likely to cause disease in the upcoming flu season. But even when the vaccine doesnt exactly match these viruses, it may still provide some protection Flu vaccine cannot prevent: 
 flu that is caused by a virus not covered by the vaccine, or 
 illnesses that look like flu but are not. It takes about 2 weeks for protection to develop after vaccination, and protection lasts through the flu season. 3. Some people should not get this vaccine Tell the person who is giving you the vaccine:  If you have any severe, life-threatening allergies. If you ever had a life-threatening allergic reaction after a dose of flu vaccine, or have a severe allergy to any part of this vaccine, you may be advised not to get vaccinated. Most, but not all, types of flu vaccine contain a small amount of egg protein.  If you ever had Guillain-Barré Syndrome (also called GBS). Some people with a history of GBS should not get this vaccine. This should be discussed with your doctor.  If you are not feeling well. It is usually okay to get flu vaccine when you have a mild illness, but you might be asked to come back when you feel better. 4. Risks of a vaccine reaction With any medicine, including vaccines, there is a chance of reactions. These are usually mild and go away on their own, but serious reactions are also possible. Most people who get a flu shot do not have any problems with it. Minor problems following a flu shot include:  
 soreness, redness, or swelling where the shot was given  hoarseness  sore, red or itchy eyes  cough  fever  aches  headache  itching  fatigue If these problems occur, they usually begin soon after the shot and last 1 or 2 days. More serious problems following a flu shot can include the following:  There may be a small increased risk of Guillain-Barré Syndrome (GBS) after inactivated flu vaccine. This risk has been estimated at 1 or 2 additional cases per million people vaccinated. This is much lower than the risk of severe complications from flu, which can be prevented by flu vaccine.  Young children who get the flu shot along with pneumococcal vaccine (PCV13) and/or DTaP vaccine at the same time might be slightly more likely to have a seizure caused by fever. Ask your doctor for more information. Tell your doctor if a child who is getting flu vaccine has ever had a seizure. Problems that could happen after any injected vaccine:  People sometimes faint after a medical procedure, including vaccination. Sitting or lying down for about 15 minutes can help prevent fainting, and injuries caused by a fall. Tell your doctor if you feel dizzy, or have vision changes or ringing in the ears.  Some people get severe pain in the shoulder and have difficulty moving the arm where a shot was given. This happens very rarely.  Any medication can cause a severe allergic reaction. Such reactions from a vaccine are very rare, estimated at about 1 in a million doses, and would happen within a few minutes to a few hours after the vaccination. As with any medicine, there is a very remote chance of a vaccine causing a serious injury or death. The safety of vaccines is always being monitored. For more information, visit: www.cdc.gov/vaccinesafety/ 
 
5. What if there is a serious reaction? What should I look for?  Look for anything that concerns you, such as signs of a severe allergic reaction, very high fever, or unusual behavior. Signs of a severe allergic reaction can include hives, swelling of the face and throat, difficulty breathing, a fast heartbeat, dizziness, and weakness  usually within a few minutes to a few hours after the vaccination. What should I do?  If you think it is a severe allergic reaction or other emergency that cant wait, call 9-1-1 and get the person to the nearest hospital. Otherwise, call your doctor.  Reactions should be reported to the Vaccine Adverse Event Reporting System (VAERS). Your doctor should file this report, or you can do it yourself through  the VAERS web site at www.vaers. [facility].gov, or by calling 0-506.966.5988. VAERS does not give medical advice. 6. The National Vaccine Injury Compensation Program 
 
The [facility], [facility] within [facility] Vaccine Injury Compensation Program (VICP) is a federal program that was created to compensate people who may have been injured by certain vaccines. Persons who believe they may have been injured by a vaccine can learn about the program and about filing a claim by calling 1-864.265.9201 or visiting the [employer]: turn off the lights website at www.[facility].gov/vaccinecompensation. There is a time limit to file a claim for compensation. 7. How can I learn more?  Ask your healthcare provider. He or she can give you the vaccine package insert or suggest other sources of information.  Call your local or state health department.  Contact the Centers for Disease Control and Prevention (CDC): 
- Call 6-937.151.4681 (1-800-CDC-INFO) or 
- Visit CDCs website at www.cdc.gov/flu Vaccine Information Statement Inactivated Influenza Vaccine 8/7/2015 
42 MARCIA Olivera 420DL-41 Department of Health and [employer] Centers for Disease Control and Prevention Office Use Only

## 2018-11-10 ENCOUNTER — HOSPITAL ENCOUNTER (EMERGENCY)
Age: 48
Discharge: HOME | End: 2018-11-10
Attending: EMERGENCY MEDICINE
Payer: MEDICAID

## 2018-11-10 VITALS
OXYGEN SATURATION: 99 % | HEART RATE: 67 BPM | BODY MASS INDEX: 49.38 KG/M2 | TEMPERATURE: 98.8 F | SYSTOLIC BLOOD PRESSURE: 121 MMHG | DIASTOLIC BLOOD PRESSURE: 75 MMHG | WEIGHT: 270 LBS | RESPIRATION RATE: 14 BRPM

## 2018-11-10 DIAGNOSIS — N89.8 VAGINAL IRRITATION: Primary | ICD-10-CM

## 2018-11-10 LAB
APPEARANCE UR: CLEAR
BACTERIA URNS QL MICRO: ABNORMAL /HPF
BILIRUB UR QL: NEGATIVE
COLOR UR: YELLOW
EPITH CASTS URNS QL MICRO: ABNORMAL /LPF (ref 0–5)
GLUCOSE UR STRIP.AUTO-MCNC: NEGATIVE MG/DL
HGB UR QL STRIP: NEGATIVE
KETONES UR QL STRIP.AUTO: ABNORMAL MG/DL
LEUKOCYTE ESTERASE UR QL STRIP.AUTO: ABNORMAL
NITRITE UR QL STRIP.AUTO: NEGATIVE
PH UR STRIP: 5.5 [PH] (ref 5–8)
PROT UR STRIP-MCNC: NEGATIVE MG/DL
RBC #/AREA URNS HPF: NEGATIVE /HPF (ref 0–5)
SERVICE CMNT-IMP: NORMAL
SP GR UR REFRACTOMETRY: >1.03 (ref 1–1.03)
UROBILINOGEN UR QL STRIP.AUTO: 1 EU/DL (ref 0.2–1)
WBC URNS QL MICRO: ABNORMAL /HPF (ref 0–4)
WET PREP GENITAL: NORMAL

## 2018-11-10 PROCEDURE — 74011250637 HC RX REV CODE- 250/637: Performed by: EMERGENCY MEDICINE

## 2018-11-10 PROCEDURE — 81001 URINALYSIS AUTO W/SCOPE: CPT

## 2018-11-10 PROCEDURE — 87086 URINE CULTURE/COLONY COUNT: CPT

## 2018-11-10 PROCEDURE — 87210 SMEAR WET MOUNT SALINE/INK: CPT

## 2018-11-10 PROCEDURE — 99283 EMERGENCY DEPT VISIT LOW MDM: CPT

## 2018-11-10 PROCEDURE — 87491 CHLMYD TRACH DNA AMP PROBE: CPT

## 2018-11-10 RX ORDER — FLUCONAZOLE 150 MG/1
150 TABLET ORAL
Status: COMPLETED | OUTPATIENT
Start: 2018-11-10 | End: 2018-11-10

## 2018-11-10 RX ORDER — NITROFURANTOIN 25; 75 MG/1; MG/1
100 CAPSULE ORAL 2 TIMES DAILY
Qty: 6 CAP | Refills: 0 | Status: SHIPPED | OUTPATIENT
Start: 2018-11-10 | End: 2018-11-13

## 2018-11-10 RX ADMIN — FLUCONAZOLE 150 MG: 150 TABLET ORAL at 03:22

## 2018-11-10 NOTE — DISCHARGE INSTRUCTIONS

## 2018-11-10 NOTE — ED PROVIDER NOTES
EMERGENCY DEPARTMENT HISTORY AND PHYSICAL EXAM 
 
3:02 AM 
 
 
Date: 11/10/2018 Patient Name: Stan Oneil History of Presenting Illness Chief Complaint Patient presents with  (LUTS) Lower Urinary Tract Symptoms  Abdominal Pain History Provided By: Patient Chief Complaint: vaginal discharge Duration: 3 Days Timing:  Constant Location: vagina Quality: Burning Severity: 7 out of 10 Modifying Factors: none Associated Symptoms: denies any other associated signs or symptoms Additional History (Context): Stan Oneil is a 49 y.o. female with hypertension who presents with vaginal irritation and dysuria x 3 days. Pt thought she had a yeast infection and used monostat for 3 days with no relief. She only has burning with urination when urine touches the skin. She admits to prior history of UTI's. Denies fever, chills, n/v/d or back pain. PCP: CHRISSY Naylor Current Outpatient Medications Medication Sig Dispense Refill  nitrofurantoin, macrocrystal-monohydrate, (MACROBID) 100 mg capsule Take 1 Cap by mouth two (2) times a day for 3 days. 6 Cap 0  
 metoprolol tartrate (LOPRESSOR) 100 mg IR tablet Take 1 Tab by mouth daily for 180 days. 90 Tab 1  
 lisinopril-hydroCHLOROthiazide (PRINZIDE, ZESTORETIC) 20-25 mg per tablet TAKE 1 TABLET BY MOUTH DAILY 90 Tab 1  permethrin (ACTICIN) 5 % topical cream massage into skin from head to toe. rinse off after 8-14 hours. 60 g 0  
 chlorpheniramine-HYDROcodone (TUSSIONEX PENNKINETIC ER) 10-8 mg/5 mL suspension Take 5 mL by mouth every twelve (12) hours as needed for Cough. Max Daily Amount: 10 mL. 115 mL 0  
 ibuprofen (MOTRIN) 800 mg tablet TAKE 1 TABLET BY MOUTH EVERY 8 HOURS AS NEEDED FOR PAIN 30 Tab 0  Cholecalciferol, Vitamin D3, (VITAMIN D3) 2,000 unit cap capsule Take 2,000 Units by mouth daily. 30 Cap 2 Past History Past Medical History: 
Past Medical History:  
Diagnosis Date  Acid reflux  Acute cystitis with hematuria  Heel spur  HTN (hypertension), benign 2009  Irregular menses  Menorrhagia  Plantar fasciitis  Shoulder bursitis  Urticaria  Vulvovaginitis Past Surgical History: 
Past Surgical History:  
Procedure Laterality Date  HX CHOLECYSTECTOMY  HX GYN  2003   HX GYN  2005 BTL Family History: 
Family History Problem Relation Age of Onset  Cancer Mother  Diabetes Mother  Heart Failure Mother  Arthritis-rheumatoid Mother  Stroke Father  Hypertension Father  Substance Abuse Father   
     tobacco use  Alzheimer Father  Cancer Brother  Diabetes Brother  Drug Abuse Brother  Alcohol abuse Maternal Grandfather  Attention Deficit Disorder Daughter  Heart Attack Maternal Aunt Social History: 
Social History Tobacco Use  Smoking status: Never Smoker  Smokeless tobacco: Never Used Substance Use Topics  Alcohol use: Yes Comment: rare  Drug use: No  
 
 
Allergies: 
No Known Allergies Review of Systems Review of Systems Constitutional: Negative for chills and fever. Respiratory: Negative for shortness of breath. Cardiovascular: Negative for chest pain. Gastrointestinal: Negative for nausea and vomiting. All other systems reviewed and are negative. Physical Exam  
 
Visit Vitals /75 (BP 1 Location: Left arm, BP Patient Position: At rest) Pulse 67 Temp 98.8 °F (37.1 °C) Resp 14 Wt 122.5 kg (270 lb) SpO2 99% BMI 49.38 kg/m² Physical Exam  
Constitutional: She is oriented to person, place, and time. She appears well-developed and well-nourished. No distress. HENT:  
Head: Normocephalic and atraumatic. Eyes: Conjunctivae and EOM are normal. Right eye exhibits no discharge. Left eye exhibits no discharge. No scleral icterus. Neck: Normal range of motion. Neck supple. No tracheal deviation present. Cardiovascular: Normal rate, regular rhythm and normal heart sounds. No murmur heard. Pulmonary/Chest: Effort normal and breath sounds normal. No respiratory distress. She has no wheezes. She has no rales. Abdominal: Soft. She exhibits no distension. There is no tenderness. There is no rebound and no guarding. Genitourinary: Vaginal discharge (white) found. Genitourinary Comments: - CMT Musculoskeletal: Normal range of motion. She exhibits no edema or deformity. Neurological: She is alert and oriented to person, place, and time. No cranial nerve deficit. Skin: Skin is warm and dry. She is not diaphoretic. Psychiatric: She has a normal mood and affect. Her behavior is normal. Judgment and thought content normal.  
 
 
 
Diagnostic Study Results Labs - Recent Results (from the past 12 hour(s)) URINALYSIS W/ RFLX MICROSCOPIC Collection Time: 11/10/18  2:50 AM  
Result Value Ref Range Color YELLOW Appearance CLEAR Specific gravity >1.030 (H) 1.005 - 1.030  
 pH (UA) 5.5 5.0 - 8.0 Protein NEGATIVE  NEG mg/dL Glucose NEGATIVE  NEG mg/dL Ketone TRACE (A) NEG mg/dL Bilirubin NEGATIVE  NEG Blood NEGATIVE  NEG Urobilinogen 1.0 0.2 - 1.0 EU/dL Nitrites NEGATIVE  NEG Leukocyte Esterase TRACE (A) NEG URINE MICROSCOPIC ONLY Collection Time: 11/10/18  2:50 AM  
Result Value Ref Range WBC 0 to 3 0 - 4 /hpf  
 RBC NEGATIVE  0 - 5 /hpf Epithelial cells 2+ 0 - 5 /lpf Bacteria 3+ (A) NEG /hpf WET PREP Collection Time: 11/10/18  3:00 AM  
Result Value Ref Range Special Requests: NO SPECIAL REQUESTS Wet prep CLUE CELLS ABSENT 
NO TRICHOMONAS SEEN 
NO YEAST SEEN Radiologic Studies - No orders to display Medical Decision Making I am the first provider for this patient. I reviewed the vital signs, available nursing notes, past medical history, past surgical history, family history and social history. Vital Signs-Reviewed the patient's vital signs. Provider Notes (Medical Decision Making): Pt with vaginal irritation and negative (contaminated) UA and negative wet prep. Given one dose of diflucan given possibility of partially treated yeast infection. Given macrobid to hold in case pt still has dysuria in 2 days. Cx sent. Diagnosis Clinical Impression: 1. Vaginal irritation Disposition: home Follow-up Information Follow up With Specialties Details Why Contact Info CHRISSY Saunders Physician Assistant Schedule an appointment as soon as possible for a visit  [address] [place] [address] [address] [facility] 
[phone] [address] [facility] EMERGENCY DEPT Emergency Medicine  If symptoms worsen [address] [facility] [address] [phone] Medication List  
  
START taking these medications   
nitrofurantoin (macrocrystal-monohydrate) 100 mg capsule Commonly known as:  MACROBID Take 1 Cap by mouth two (2) times a day for 3 days. ASK your doctor about these medications   
chlorpheniramine-HYDROcodone 10-8 mg/5 mL suspension Commonly known as:  Equilla Shelley ER Take 5 mL by mouth every twelve (12) hours as needed for Cough. Max Daily Amount: 10 mL. Cholecalciferol (Vitamin D3) 2,000 unit Cap capsule Commonly known as:  VITAMIN D3 Take 2,000 Units by mouth daily. ibuprofen 800 mg tablet Commonly known as:  MOTRIN 
TAKE 1 TABLET BY MOUTH EVERY 8 HOURS AS NEEDED FOR PAIN 
  
lisinopril-hydroCHLOROthiazide 20-25 mg per tablet Commonly known as:  PRINZIDE, ZESTORETIC 
TAKE 1 TABLET BY MOUTH DAILY 
  
metoprolol tartrate 100 mg IR tablet Commonly known as:  LOPRESSOR Take 1 Tab by mouth daily for 180 days.  
  
permethrin 5 % topical cream 
Commonly known as:  ACTICIN 
 massage into skin from head to toe. rinse off after 8-14 hours. Where to Get Your Medications Information about where to get these medications is not yet available Ask your nurse or doctor about these medications · nitrofurantoin (macrocrystal-monohydrate) 100 mg capsule 
  
 
_______________________________ Attestations: 
Scribe Attestation Art Feranndez MD acting as a scribe for and in the presence of No att. providers found November 10, 2018 at 3:35 AM 
    
Provider Attestation:     
I personally performed the services described in the documentation, reviewed the documentation, as recorded by the scribe in my presence, and it accurately and completely records my words and actions. November 10, 2018 at 3:35 AM - No att. providers found   
_______________________________

## 2018-11-11 LAB
BACTERIA SPEC CULT: ABNORMAL
SERVICE CMNT-IMP: ABNORMAL

## 2018-11-12 ENCOUNTER — OFFICE VISIT (OUTPATIENT)
Dept: OBGYN CLINIC | Age: 48
End: 2018-11-12

## 2018-11-12 VITALS
HEIGHT: 62 IN | TEMPERATURE: 98.1 F | OXYGEN SATURATION: 98 % | HEART RATE: 80 BPM | SYSTOLIC BLOOD PRESSURE: 110 MMHG | WEIGHT: 272 LBS | BODY MASS INDEX: 50.05 KG/M2 | DIASTOLIC BLOOD PRESSURE: 77 MMHG

## 2018-11-12 DIAGNOSIS — B37.31 YEAST VAGINITIS: Primary | ICD-10-CM

## 2018-11-12 LAB
C TRACH RRNA SPEC QL NAA+PROBE: NEGATIVE
N GONORRHOEA RRNA SPEC QL NAA+PROBE: NEGATIVE
SPECIMEN SOURCE: NORMAL

## 2018-11-12 RX ORDER — TERCONAZOLE 8 MG/G
1 CREAM VAGINAL
Qty: 20 G | Refills: 4 | Status: SHIPPED | OUTPATIENT
Start: 2018-11-12 | End: 2019-01-17 | Stop reason: ALTCHOICE

## 2018-11-12 NOTE — PROGRESS NOTES
mary--tiffanie Casillas Given  Progress Note    Patient: Mahi Barrett  MRN: [id number]  Date: 11/14/2018     Age:  49 y.o.,      Sex: female    YOB: 1970    Mahi Barrett is a 49y.o. year-old female, G 4 P 4 whose last normal menstrual period is unknown. She is not on any contraceptive. She presents today with complaints of pruritic vaginal discharge x 3 weeks. Review of Systems: General, Cardiovascular, Respiratory, Gastrointestinal, Genitourinary, Neuropsychiatry, Musculoskeletal.  Patient denies any problems associated with these systems except for diabetes mellitus. .  General Examination: She is a well-developed, well-nourished female in no acute distress. Pelvic exam--External genitalia and BUS--normal                           but with thick discharge x 3-4 weeks. .             Cervix: Normal    Vagina: vaginal discharge white, thick and odorless                          Uterus: normal size, contour, position, consistency, mobility, non-tender    Adnexa: normal bimanual exam    Impression. ---Monilial Vaginitis. Diabetes Mellitus. Plan: ---Rx Terazol-3 cream.               RTC--prn.     Gaye Sanz MD  November 14, 2018

## 2018-11-13 ENCOUNTER — TELEPHONE (OUTPATIENT)
Dept: FAMILY MEDICINE CLINIC | Age: 48
End: 2018-11-13

## 2018-11-13 ENCOUNTER — HOSPITAL ENCOUNTER (OUTPATIENT)
Dept: LAB | Age: 48
Discharge: HOME | End: 2018-11-13

## 2018-11-13 LAB — SENTARA SPECIMEN COL,SENBCF: NORMAL

## 2018-11-13 PROCEDURE — 99001 SPECIMEN HANDLING PT-LAB: CPT

## 2018-11-13 NOTE — TELEPHONE ENCOUNTER
Patient called to let Cee Emily know that she had labs done today. Her OB/gyn told her to let us know when she had them done so she could receive her results ASAP. Her # is [phone].

## 2018-11-13 NOTE — TELEPHONE ENCOUNTER
Called home number. Verified name and . Spoke with patient. Per patient she saw OBGYN was encourage to get labs completed that was ordered by Dilip LOWE, HGBA1C and VitD. Per patient provider may suspect DM due to recurrent yeast infections. Patient was notified that those results have not returned, but will be contacted once in. Patient had no further questions or concerns.

## 2018-11-14 LAB
25(OH)D3 SERPL-MCNC: 26.6 NG/ML (ref 32–100)
AVG GLU, 10930: 120 MG/DL (ref 91–123)
HBA1C MFR BLD HPLC: 5.8 % (ref 4.8–5.9)

## 2018-11-14 NOTE — TELEPHONE ENCOUNTER
Both A1c and vitamin D levels have improved! She is still in the prediabetic range, but A1c has dropped from 6.1 to 5.8% which is great. Continue to work on diet and exercise, and continue vitamin D supplement.

## 2018-11-14 NOTE — TELEPHONE ENCOUNTER
Called home number. Verified name and . Spoke with patient. Patient notified of results below per Dipti LOWE. Patient understood the reason for call and had no further questions or concerns.

## 2018-11-15 NOTE — PATIENT INSTRUCTIONS
Candidiasis: Care Instructions  Your Care Instructions  Candidiasis (say \"lda-qfa-YT-uh-mike\") is a yeast infection. Yeast normally lives in your body. But it can cause problems if your body's defenses don't work as they should. Some medicines can increase your chance of getting a yeast infection. These include antibiotics, steroids, and cancer drugs. And some diseases like AIDS and diabetes can make you more likely to get yeast infections. There are different types of yeast infections. [place] is a yeast infection in the mouth. It usually occurs in people with weak immune systems. It causes white patches inside the mouth and throat. Yeast infections of the skin usually occur in skin folds where the skin stays moist. They cause red, oozing patches on your skin. Babies can get these infections under the diaper. People who often wear gloves can get them on their hands. Many women get vaginal yeast infections. They are most common when women take antibiotics. These infections can cause the vagina to itch and burn. They also cause white discharge that looks like cottage cheese. In rare cases, yeast infects the blood. This can cause serious disease. This kind of infection is treated with medicine given through a needle into a vein (IV). After you start treatment, a yeast infection usually goes away quickly. But if your immune system is weak, the infection may come back. Tell your doctor if you get yeast infections often. Follow-up care is a key part of your treatment and safety. Be sure to make and go to all appointments, and call your doctor if you are having problems. It's also a good idea to know your test results and keep a list of the medicines you take. How can you care for yourself at home? · Take your medicines exactly as prescribed. Call your doctor if you think you are having a problem with your medicine. · Use antibiotics only as directed by your doctor. · Eat yogurt with live cultures.  It has bacteria called lactobacillus. It may help prevent some types of yeast infections. · Keep your skin clean and dry. Put powder on moist places. · If you are using a cream or suppository to treat a vaginal yeast infection, don't use condoms or a diaphragm. Use a different type of birth control. · Eat a healthy diet and get regular exercise. This will help keep your immune system strong. When should you call for help? Watch closely for changes in your health, and be sure to contact your doctor if:    · You do not get better as expected. Where can you learn more? Go to http://ivanMacrocosmjoshua.info/. Enter F807 in the search box to learn more about \"Candidiasis: Care Instructions. \"  Current as of: May 15, 2018  Content Version: 11.8  © 2301-1982 [employer]. Care instructions adapted under license by [employer] (which disclaims liability or warranty for this information). If you have questions about a medical condition or this instruction, always ask your healthcare professional. [address] any warranty or liability for your use of this information. Vaginal Yeast Infection: Care Instructions  Your Care Instructions    A vaginal yeast infection is caused by too many yeast cells in the vagina. This is common in women of all ages. Itching, vaginal discharge and irritation, and other symptoms can bother you. But yeast infections don't often cause other health problems. Some medicines can increase your risk of getting a yeast infection. These include antibiotics, birth control pills, hormones, and steroids. You may also be more likely to get a yeast infection if you are pregnant, have diabetes, douche, or wear tight clothes. With treatment, most yeast infections get better in 2 to 3 days. Follow-up care is a key part of your treatment and safety. Be sure to make and go to all appointments, and call your doctor if you are having problems.  It's also a good idea to know your test results and keep a list of the medicines you take. How can you care for yourself at home? · Take your medicines exactly as prescribed. Call your doctor if you think you are having a problem with your medicine. · Ask your doctor about over-the-counter (OTC) medicines for yeast infections. They may cost less than prescription medicines. If you use an OTC treatment, read and follow all instructions on the label. · Do not use tampons while using a vaginal cream or suppository. The tampons can absorb the medicine. Use pads instead. · Wear loose cotton clothing. Do not wear nylon or other fabric that holds body heat and moisture close to the skin. · Try sleeping without underwear. · Do not scratch. Relieve itching with a cold pack or a cool bath. · Do not wash your vaginal area more than once a day. Use plain water or a mild, unscented soap. Air-dry the vaginal area. · Change out of wet swimsuits after swimming. · Do not have sex until you have finished your treatment. · Do not douche. When should you call for help? Call your doctor now or seek immediate medical care if:    · You have unexpected vaginal bleeding.     · You have new or increased pain in your vagina or pelvis.    Watch closely for changes in your health, and be sure to contact your doctor if:    · You have a fever.     · You are not getting better after 2 days.     · Your symptoms come back after you finish your medicines. Where can you learn more? Go to http://ivan-joshua.info/. Enter M655 in the search box to learn more about \"Vaginal Yeast Infection: Care Instructions. \"  Current as of: May 15, 2018  Content Version: 11.8  © 1140-0184 [employer]. Care instructions adapted under license by [employer] (which disclaims liability or warranty for this information).  If you have questions about a medical condition or this instruction, always ask your healthcare professional. [address] any warranty or liability for your use of this information. Noninsulin Medicines for Type 2 Diabetes: Care Instructions  Your Care Instructions    There are different types of noninsulin medicines for diabetes. Each works in a different way. But they all help you control your blood sugar. Some types help your body make insulin to lower your blood sugar. Others lower how much insulin your body needs. Some can slow how fast your body digests sugars. And some can remove extra glucose through your urine. · Alpha-glucosidase inhibitors. These keep starches from breaking down. This means that they lower the amount of glucose absorbed when you eat. They don't help your body make more insulin. So they will not cause low blood sugar unless you use them with other medicines for diabetes. They include acarbose and miglitol. · DPP-4 inhibitors. These help your body raise the level of insulin after you eat. They also help your body make less of a hormone that raises blood sugar. They include linagliptin, saxagliptin, and sitagliptin. · Incretin hormones (GLP-1 receptor agonists). Your body makes a protein that can raise your insulin level. It also can lower your blood sugar and make you less hungry. You can get shots of hormones that work the same way. They include exenatide and liraglutide. · Meglitinides. These help your body release insulin. They also help slow how your body digests sugars. So they can keep your blood sugar from rising too fast after you eat. They include nateglinide and repaglinide. · Metformin. This lowers how much glucose your liver makes. And it helps you respond better to insulin. It also lowers the amount of stored sugar that your liver releases when you are not eating. · SGLT2 inhibitors. These help to remove extra glucose through your urine. They may also help some people lose weight.  They include canagliflozin, dapagliflozin, and empagliflozin. · Sulfonylureas. These help your body release more insulin. Some work for many hours. They can cause low blood sugar if you don't eat as you planned. They include glipizide and glyburide. · Thiazolidinediones. These reduce the amount of blood glucose. They also help you respond better to insulin. They include pioglitazone and rosiglitazone. You may need to take more than one medicine for diabetes. Two or more medicines may work better to lower your blood sugar level than just one does. Follow-up care is a key part of your treatment and safety. Be sure to make and go to all appointments, and call your doctor if you are having problems. It's also a good idea to know your test results and keep a list of the medicines you take. How can you care for yourself at home? · Eat a healthy diet. Get some exercise each day. This may help you to reduce how much medicine you need. · Do not take other prescription or over-the-counter medicines, vitamins, herbal products, or supplements without talking to your doctor first. Some medicines for type 2 diabetes can cause problems with other medicines or supplements. · Tell your doctor if you plan to get pregnant. Some of these drugs are not safe for pregnant women. · Be safe with medicines. Take your medicines exactly as prescribed. Meglitinides and sulfonylureas can cause your blood sugar to drop very low. Call your doctor if you think you are having a problem with your medicine. · Check your blood sugar often. You can use a glucose monitor. Keeping track can help you know how certain foods, activities, and medicines affect your blood sugar. And it can help you keep your blood sugar from getting so low that it's not safe. When should you call for help? Call 911 anytime you think you may need emergency care.  For example, call if:    · You passed out (lost consciousness).     · You are confused or cannot think clearly.     · Your blood sugar is very high or very low.    Watch closely for changes in your health, and be sure to contact your doctor if:    · Your blood sugar stays outside the level your doctor set for you.     · You have any problems. Where can you learn more? Go to http://ivan-joshua.info/. Enter H153 in the search box to learn more about \"Noninsulin Medicines for Type 2 Diabetes: Care Instructions. \"  Current as of: December 7, 2017  Content Version: 11.8  © 8240-8447 [employer]. Care instructions adapted under license by [employer] (which disclaims liability or warranty for this information). If you have questions about a medical condition or this instruction, always ask your healthcare professional. [address] any warranty or liability for your use of this information.

## 2018-11-26 ENCOUNTER — TELEPHONE (OUTPATIENT)
Dept: OBGYN CLINIC | Age: 48
End: 2018-11-26

## 2018-11-26 DIAGNOSIS — B96.89 BV (BACTERIAL VAGINOSIS): Primary | ICD-10-CM

## 2018-11-26 DIAGNOSIS — N76.0 BV (BACTERIAL VAGINOSIS): Primary | ICD-10-CM

## 2018-11-26 NOTE — TELEPHONE ENCOUNTER
Pt wants you to call her about getting a prescription called in. She was seen last week and she has an issue.

## 2018-11-27 NOTE — TELEPHONE ENCOUNTER
Patient is reaching out again in reference to seeing if she can get a medication called in for bacterial infection. Please giver patient call back.

## 2018-11-29 RX ORDER — METRONIDAZOLE 500 MG/1
500 TABLET ORAL 3 TIMES DAILY
Qty: 30 TAB | Refills: 0 | Status: SHIPPED | OUTPATIENT
Start: 2018-11-29 | End: 2018-12-09

## 2019-01-17 ENCOUNTER — OFFICE VISIT (OUTPATIENT)
Dept: FAMILY MEDICINE CLINIC | Age: 49
End: 2019-01-17

## 2019-01-17 VITALS
DIASTOLIC BLOOD PRESSURE: 72 MMHG | SYSTOLIC BLOOD PRESSURE: 108 MMHG | OXYGEN SATURATION: 97 % | BODY MASS INDEX: 49.47 KG/M2 | HEIGHT: 62 IN | TEMPERATURE: 98.7 F | WEIGHT: 268.8 LBS | RESPIRATION RATE: 12 BRPM | HEART RATE: 69 BPM

## 2019-01-17 DIAGNOSIS — J03.00 ACUTE NON-RECURRENT STREPTOCOCCAL TONSILLITIS: Primary | ICD-10-CM

## 2019-01-17 DIAGNOSIS — R05.9 COUGH: ICD-10-CM

## 2019-01-17 LAB
S PYO AG THROAT QL: POSITIVE
VALID INTERNAL CONTROL?: YES

## 2019-01-17 RX ORDER — AMOXICILLIN 500 MG/1
500 CAPSULE ORAL 2 TIMES DAILY
Qty: 20 CAP | Refills: 0 | Status: SHIPPED | OUTPATIENT
Start: 2019-01-17 | End: 2019-01-27

## 2019-01-17 RX ORDER — HYDROCODONE POLISTIREX AND CHLORPHENIRAMINE POLISTIREX 10; 8 MG/5ML; MG/5ML
5 SUSPENSION, EXTENDED RELEASE ORAL
Qty: 115 ML | Refills: 0 | Status: SHIPPED | OUTPATIENT
Start: 2019-01-17 | End: 2019-06-26 | Stop reason: ALTCHOICE

## 2019-01-17 NOTE — LETTER
NOTIFICATION RETURN TO WORK  
 
1/17/2019 2:13 PM 
 
Ms. Fareed Rudolph [facility] [id number] To Whom It May Concern: 
 
Fareed Rudolph is currently under the care of [facility]. She will return to work on 1/22/18 If there are questions or concerns please have the patient contact our office.  
 
 
Sincerely, 
 
 
 
 
 
CHRISSY Syed

## 2019-01-17 NOTE — PROGRESS NOTES
Chief Complaint   Patient presents with    Sore Throat     1 week    Cough     1 week     Patient is not fasting. Patient in room # 6.       1. Have you been to the ER, urgent care clinic since your last visit? Hospitalized since your last visit? No    2. Have you seen or consulted any other health care providers outside of the [address] [place] [address] since your last visit? Include any pap smears or colon screening. No     Reviewed. Medicare not due.   Verbal order for in office strep test per CHRISSY Maddox/Chey Carmona LPN

## 2019-01-17 NOTE — PATIENT INSTRUCTIONS

## 2019-01-17 NOTE — PROGRESS NOTES
Patient: Liu Fajardo MRN: [id number]  SSN: [id number]    YOB: 1970  Age: 51 y.o. Sex: female      Date of Service: 1/17/2019   Provider: CHRISSY Hernandez   Office Location:   [address], [address]  Office Phone: [phone]  Office Fax: [phone]        REASON FOR VISIT:   Chief Complaint   Patient presents with    Sore Throat     1 week    Cough     1 week        VITALS:   Visit Vitals  /72 (BP 1 Location: Right arm, BP Patient Position: Sitting) Comment: manual   Pulse 69   Temp 98.7 °F (37.1 °C) (Oral)   Resp 12   Ht 5' 2\" (1.575 m)   Wt 268 lb 12.8 oz (121.9 kg)   LMP 12/05/2018   SpO2 97%   BMI 49.16 kg/m²       MEDICATIONS:   Current Outpatient Medications   Medication Sig Dispense Refill    D3-2000 2,000 unit cap capsule TAKE 1 CAPSULE BY MOUTH DAILY 30 Cap 0    metoprolol tartrate (LOPRESSOR) 100 mg IR tablet Take 1 Tab by mouth daily for 180 days. 90 Tab 1    lisinopril-hydroCHLOROthiazide (PRINZIDE, ZESTORETIC) 20-25 mg per tablet TAKE 1 TABLET BY MOUTH DAILY 90 Tab 1    chlorpheniramine-HYDROcodone (TUSSIONEX PENNKINETIC ER) 10-8 mg/5 mL suspension Take 5 mL by mouth every twelve (12) hours as needed for Cough. Max Daily Amount: 10 mL. 115 mL 0        ALLERGIES:   No Known Allergies     ACTIVE MEDICAL PROBLEMS:  Patient Active Problem List   Diagnosis Code    Essential hypertension I10    Prediabetes R73.03    Morbid obesity with BMI of 45.0-49.9, adult ([facility] E66.01, Z68.42          HISTORY OF PRESENT ILLNESS:   Liu Fajardo is a 51 y.o. female who presents to the office for acute care. Here today with complaints of severe sore throat since Saturday 1/12. Patient reports symptoms have been worsening since onset. Pain is constant, but worsens with swallowing or talking. She also complains of cough for the past 2 days and requests a refill of Tussionex cough syrup.    Patient denies fevers, chills, mechanical difficulty swallowing, chest pain, SOB, wheezing, n/v/d. She does admit to some mild nasal congestion. REVIEW OF SYSTEMS:  ROS as noted in HPI. PHYSICAL EXAMINATION:  Physical Exam   Constitutional: She is oriented to person, place, and time and well-developed, well-nourished, and in no distress. HENT:   Head: Normocephalic and atraumatic. Right Ear: Tympanic membrane and external ear normal.   Left Ear: Tympanic membrane and external ear normal.   Nose: Mucosal edema present. Mouth/Throat: Uvula is midline and mucous membranes are normal. Posterior oropharyngeal edema and posterior oropharyngeal erythema present. No oropharyngeal exudate or tonsillar abscesses. Eyes: Conjunctivae are normal. Pupils are equal, round, and reactive to light. Right eye exhibits no discharge. Left eye exhibits no discharge. Neck: Neck supple. No thyromegaly present. Cardiovascular: Normal rate, regular rhythm and normal heart sounds. Exam reveals no gallop and no friction rub. No murmur heard. Pulmonary/Chest: Effort normal and breath sounds normal. She has no wheezes. She has no rales. Lymphadenopathy:     She has cervical adenopathy. Neurological: She is alert and oriented to person, place, and time. Gait normal.   Skin: Skin is warm and dry. No rash noted. Psychiatric: Mood, memory and affect normal.        RESULTS:  Results for orders placed or performed in visit on 01/17/19   AMB POC RAPID STREP A   Result Value Ref Range    VALID INTERNAL CONTROL POC Yes     Group A Strep Ag Positive Negative        ASSESSMENT/PLAN:  Diagnoses and all orders for this visit:    1. Acute non-recurrent streptococcal tonsillitis  - Rapid strep positive  - Start amoxicillin as below  - Counseled on supportive care  - Out of work note provided  Orders:    -     AMB POC RAPID STREP A  -     amoxicillin (AMOXIL) 500 mg capsule; Take 1 Cap by mouth two (2) times a day for 10 days.     2. Cough  Orders: -     chlorpheniramine-HYDROcodone (TUSSIONEX PENNKINETIC ER) 10-8 mg/5 mL suspension; Take 5 mL by mouth every twelve (12) hours as needed for Cough. Max Daily Amount: 10 mL. All questions answered. Patient expresses understanding and agrees with the plan as detailed above. Follow-up Disposition:  Return for f/u as scheduled.        PATIENT CARE TEAM:   Patient Care Team:  CHRISSY Bain as PCP - General (Physician Assistant)       CHRISSY Vaughan   January 17, 2019   2:20 PM

## 2019-01-18 ENCOUNTER — TELEPHONE (OUTPATIENT)
Dept: FAMILY MEDICINE CLINIC | Age: 49
End: 2019-01-18

## 2019-01-18 NOTE — TELEPHONE ENCOUNTER
I would advise against use of decongestants because she has hypertension.  She could try OTC plain Mucinex (guaifenesin) to help with congestion

## 2019-01-18 NOTE — TELEPHONE ENCOUNTER
Contacted patient and verified identity using name and date of birth (2- identifiers)  Spoke with patient and advised against decongestants 2' to HTN. Advised OTC Mucinex (guaifenesin). Patient verbalized understanding.

## 2019-01-18 NOTE — TELEPHONE ENCOUNTER
Pt is requesting a RX for a decongestant because she has tonsillitis and it has cause her to get stopped up. Please advise.   [facility] [phone]

## 2019-01-19 ENCOUNTER — TELEPHONE (OUTPATIENT)
Dept: FAMILY MEDICINE CLINIC | Age: 49
End: 2019-01-19

## 2019-01-19 NOTE — TELEPHONE ENCOUNTER
Pt called to report that she was dx with tonsillitis at a recent visit but now has congestion of her nose and ears along with a cough. Pt would like to know what medication to take. Pt advised to try the otc coricidin line of cold meds as these are ok for hypertensives. Also suggested that she could try vicks vaporub on her chest and use menthol cough drops to help with the congestion. Pt understands and agrees with this plan. Pt aware that she needs to cont her abx.

## 2019-02-06 RX ORDER — BUTYROSPERMUM PARKII(SHEA BUTTER), SIMMONDSIA CHINENSIS (JOJOBA) SEED OIL, ALOE BARBADENSIS LEAF EXTRACT .01; 1; 3.5 G/100G; G/100G; G/100G
LIQUID TOPICAL
Qty: 30 CAP | Refills: 5 | Status: SHIPPED | OUTPATIENT
Start: 2019-02-06 | End: 2019-09-10 | Stop reason: SDUPTHER

## 2019-02-19 ENCOUNTER — OFFICE VISIT (OUTPATIENT)
Dept: FAMILY MEDICINE CLINIC | Age: 49
End: 2019-02-19

## 2019-02-19 VITALS
WEIGHT: 272.4 LBS | DIASTOLIC BLOOD PRESSURE: 72 MMHG | HEART RATE: 81 BPM | SYSTOLIC BLOOD PRESSURE: 102 MMHG | BODY MASS INDEX: 50.13 KG/M2 | RESPIRATION RATE: 12 BRPM | HEIGHT: 62 IN | TEMPERATURE: 98.5 F | OXYGEN SATURATION: 97 %

## 2019-02-19 DIAGNOSIS — E55.9 VITAMIN D DEFICIENCY: ICD-10-CM

## 2019-02-19 DIAGNOSIS — R73.03 PREDIABETES: ICD-10-CM

## 2019-02-19 DIAGNOSIS — E66.01 MORBID OBESITY WITH BMI OF 45.0-49.9, ADULT (HCC): ICD-10-CM

## 2019-02-19 DIAGNOSIS — I10 ESSENTIAL HYPERTENSION: Primary | ICD-10-CM

## 2019-02-19 DIAGNOSIS — J30.9 CHRONIC ALLERGIC RHINITIS: ICD-10-CM

## 2019-02-19 RX ORDER — METOPROLOL TARTRATE 100 MG/1
100 TABLET ORAL DAILY
Qty: 90 TAB | Refills: 1 | Status: SHIPPED | OUTPATIENT
Start: 2019-02-19 | End: 2019-08-08 | Stop reason: SDUPTHER

## 2019-02-19 RX ORDER — LISINOPRIL AND HYDROCHLOROTHIAZIDE 20; 25 MG/1; MG/1
TABLET ORAL
Qty: 90 TAB | Refills: 1 | Status: SHIPPED | OUTPATIENT
Start: 2019-02-19 | End: 2019-08-08 | Stop reason: SDUPTHER

## 2019-02-19 NOTE — PROGRESS NOTES
Chief Complaint   Patient presents with    Hypertension    Obesity    Cough     Patient is not fasting. Patient in room # 6.       1. Have you been to the ER, urgent care clinic since your last visit? Hospitalized since your last visit? No    2. Have you seen or consulted any other health care providers outside of the [address] [place] [address] since your last visit? Include any pap smears or colon screening. No    HM Reviewed.   Flowsheet, Learning needs, PHQ and abuse completed  3 most recent PHQ Screens 2/19/2019   Little interest or pleasure in doing things Not at all   Feeling down, depressed, irritable, or hopeless Not at all   Total Score PHQ 2 0

## 2019-02-19 NOTE — PROGRESS NOTES
Patient: Philipp Florence MRN: [id number]  SSN: [id number]    YOB: 1970  Age: 51 y.o. Sex: female      Date of Service: 2019   Provider: CHRISSY Vaughan   Office Location:   [address] [place], [address] [facility] [facility] [address]  Office Phone: [phone]  Office Fax: [phone]      REASON FOR VISIT:   Chief Complaint   Patient presents with    Hypertension    Obesity    Cough        VITALS:   Visit Vitals  /72 (BP 1 Location: Right arm, BP Patient Position: Sitting)   Pulse 81   Temp 98.5 °F (36.9 °C) (Oral)   Resp 12   Ht 5' 2\" (1.575 m)   Wt 272 lb 6.4 oz (123.6 kg)   LMP 2019   SpO2 97%   BMI 49.82 kg/m²        MEDICATIONS:   Current Outpatient Medications on File Prior to Visit   Medication Sig Dispense Refill    D3-2000 2,000 unit cap capsule TAKE 1 CAPSULE BY MOUTH DAILY 30 Cap 5    chlorpheniramine-HYDROcodone (TUSSIONEX PENNKINETIC ER) 10-8 mg/5 mL suspension Take 5 mL by mouth every twelve (12) hours as needed for Cough. Max Daily Amount: 10 mL. 115 mL 0     No current facility-administered medications on file prior to visit.          ALLERGIES:   No Known Allergies     MEDICAL/SURGICAL HISTORY:  Past Medical History:   Diagnosis Date    Acid reflux     Acute cystitis with hematuria     Heel spur     HTN (hypertension), benign 2009    Irregular menses     Menorrhagia     Plantar fasciitis     Shoulder bursitis     Urticaria     Vulvovaginitis       Past Surgical History:   Procedure Laterality Date    HX CHOLECYSTECTOMY      HX GYN  2003        HX GYN  2005    BTL        FAMILY HISTORY:  Family History   Problem Relation Age of Onset    Cancer Mother     Diabetes Mother     Heart Failure Mother     Arthritis-rheumatoid Mother     Stroke Father     Hypertension Father     Substance Abuse Father         tobacco use    Alzheimer Father     Cancer Brother     Diabetes Brother     Drug Abuse Brother     Alcohol abuse Maternal Grandfather     Attention Deficit Disorder Daughter     Heart Attack Maternal Aunt         SOCIAL HISTORY:  Social History     Tobacco Use    Smoking status: Never Smoker    Smokeless tobacco: Never Used   Substance Use Topics    Alcohol use: Yes     Comment: rare    Drug use: No             HISTORY OF PRESENT ILLNESS: Ron Machuca is a 51 y.o. female who presents to the office for a routine follow up visit. Hypertension -   Currently, the patient's condition is well controlled. Reports compliance with the following regimen: metoprolol 100 mg daily, lisinopril-HCTZ 20-25 mg daily  Home BP readings: not checking   Lifestyle modifications: trying to work on weight loss      Prediabetes/Morbid Obesity -  Pre-DM is a new diagnosis based on A1c of 6.1% on 18. Improved to 5.8% on 18. Patient had successfully lost about 8 lb but has since regained some of it. She reports she just got back from a  in [place] and was eating a lot while she was there. 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 She would like to start by cutting out soda and trying to meal prep some healthier options to eat during the day         REVIEW OF SYSTEMS:  Review of Systems   Constitutional: Negative for chills, fever and malaise/fatigue. HENT: Positive for congestion. Eyes: Negative for blurred vision. Respiratory: Negative for cough, shortness of breath and wheezing. Cardiovascular: Negative for chest pain, palpitations and leg swelling. Neurological: Negative for dizziness and headaches. Endo/Heme/Allergies: Positive for environmental allergies.         PHYSICAL EXAMINATION:  Physical Exam   Constitutional: She is oriented to person, place, and time and well-developed, well-nourished, and in no distress. HENT:   Head: Normocephalic and atraumatic. Right Ear: External ear normal. Tympanic membrane is retracted. Left Ear: External ear normal. Tympanic membrane is retracted. Nose: Mucosal edema present. Mouth/Throat: Uvula is midline, oropharynx is clear and moist and mucous membranes are normal.   Eyes: Conjunctivae are normal. Pupils are equal, round, and reactive to light. Right eye exhibits no discharge. Left eye exhibits no discharge. Neck: Neck supple. Cardiovascular: Normal rate, regular rhythm and normal heart sounds. Exam reveals no gallop and no friction rub. No murmur heard. Pulmonary/Chest: Effort normal and breath sounds normal. She has no wheezes. She has no rales. Lymphadenopathy:     She has no cervical adenopathy. Neurological: She is alert and oriented to person, place, and time. Gait normal.   Skin: Skin is warm and dry. No rash noted. Psychiatric: Mood, memory and affect normal.        RESULTS:  No results found for this visit on 02/19/19. ASSESSMENT/PLAN:  Diagnoses and all orders for this visit:    1. Essential hypertension  - Well controlled, continue current regimen  Orders:    -     metoprolol tartrate (LOPRESSOR) 100 mg IR tablet; Take 1 Tab by mouth daily for 180 days. -     lisinopril-hydroCHLOROthiazide (PRINZIDE, ZESTORETIC) 20-25 mg per tablet; TAKE 1 TABLET BY MOUTH DAILY    2. Prediabetes  - Long discussion today regarding importance of weight management to prevent progression to diabetes  - Discussed some weight loss strategies  - Re-check labs prior to next visit  Orders:    -     METABOLIC PANEL, COMPREHENSIVE; Future  -     LIPID PANEL; Future  -     HEMOGLOBIN A1C W/O EAG; Future    3. Vitamin D deficiency  Orders:    -     VITAMIN D, 25 HYDROXY    4. Chronic allergic rhinitis  - Suggested OTC Zyrtec and Flonase     5.  Morbid obesity with BMI of 45.0-49.9, adult ([address]  - see above re: diet/exercise       Follow-up Disposition:  Return in about 6 months (around 8/19/2019) for routine care, fasting labs prior. All questions answered. Patient expresses understanding and agrees with the plan as detailed above.     PATIENT CARE TEAM:   Patient Care Team:  CHRISSY Squires as PCP - General (Physician Assistant)       CHRISSY Mayers   February 19, 2019   10:28 AM

## 2019-02-19 NOTE — PATIENT INSTRUCTIONS
A Healthy Lifestyle: Care Instructions  Your Care Instructions    A healthy lifestyle can help you feel good, stay at a healthy weight, and have plenty of energy for both work and play. A healthy lifestyle is something you can share with your whole family. A healthy lifestyle also can lower your risk for serious health problems, such as high blood pressure, heart disease, and diabetes. You can follow a few steps listed below to improve your health and the health of your family. Follow-up care is a key part of your treatment and safety. Be sure to make and go to all appointments, and call your doctor if you are having problems. It's also a good idea to know your test results and keep a list of the medicines you take. How can you care for yourself at home? · Do not eat too much sugar, fat, or fast foods. You can still have dessert and treats now and then. The goal is moderation. · Start small to improve your eating habits. Pay attention to portion sizes, drink less juice and soda pop, and eat more fruits and vegetables. ? Eat a healthy amount of food. A 3-ounce serving of meat, for example, is about the size of a deck of cards. Fill the rest of your plate with vegetables and whole grains. ? Limit the amount of soda and sports drinks you have every day. Drink more water when you are thirsty. ? Eat at least 5 servings of fruits and vegetables every day. It may seem like a lot, but it is not hard to reach this goal. A serving or helping is 1 piece of fruit, 1 cup of vegetables, or 2 cups of leafy, raw vegetables. Have an apple or some carrot sticks as an afternoon snack instead of a candy bar. Try to have fruits and/or vegetables at every meal.  · Make exercise part of your daily routine. You may want to start with simple activities, such as walking, bicycling, or slow swimming. Try to be active 30 to 60 minutes every day. You do not need to do all 30 to 60 minutes all at once.  For example, you can exercise 3 times a day for 10 or 20 minutes. Moderate exercise is safe for most people, but it is always a good idea to talk to your doctor before starting an exercise program.  · Keep moving. Quintenkulwant Ontiveros the lawn, work in the garden, or [employer]. Take the stairs instead of the elevator at work. · If you smoke, quit. People who smoke have an increased risk for heart attack, stroke, cancer, and other lung illnesses. Quitting is hard, but there are ways to boost your chance of quitting tobacco for good. ? Use nicotine gum, patches, or lozenges. ? Ask your doctor about stop-smoking programs and medicines. ? Keep trying. In addition to reducing your risk of diseases in the future, you will notice some benefits soon after you stop using tobacco. If you have shortness of breath or asthma symptoms, they will likely get better within a few weeks after you quit. · Limit how much alcohol you drink. Moderate amounts of alcohol (up to 2 drinks a day for men, 1 drink a day for women) are okay. But drinking too much can lead to liver problems, high blood pressure, and other health problems. Family health  If you have a family, there are many things you can do together to improve your health. · Eat meals together as a family as often as possible. · Eat healthy foods. This includes fruits, vegetables, lean meats and dairy, and whole grains. · Include your family in your fitness plan. Most people think of activities such as jogging or tennis as the way to fitness, but there are many ways you and your family can be more active. Anything that makes you breathe hard and gets your heart pumping is exercise. Here are some tips:  ? Walk to do errands or to take your child to school or the bus.  ? Go for a family bike ride after dinner instead of watching TV. Where can you learn more? Go to http://ivan-joshua.info/. Enter W821 in the search box to learn more about \"A Healthy Lifestyle: Care Instructions. \"  Current as of: September 11, 2018  Content Version: 11.9  © 4625-2629 [employer]. Care instructions adapted under license by "[employer] (which disclaims liability or warranty for this information). If you have questions about a medical condition or this instruction, always ask your healthcare professional. [address] any warranty or liability for your use of this information.

## 2019-02-20 DIAGNOSIS — R73.03 PREDIABETES: ICD-10-CM

## 2019-04-29 ENCOUNTER — OFFICE VISIT (OUTPATIENT)
Dept: ORTHOPEDIC SURGERY | Facility: CLINIC | Age: 49
End: 2019-04-29

## 2019-04-29 VITALS
TEMPERATURE: 97.8 F | DIASTOLIC BLOOD PRESSURE: 85 MMHG | WEIGHT: 282.2 LBS | RESPIRATION RATE: 18 BRPM | OXYGEN SATURATION: 100 % | SYSTOLIC BLOOD PRESSURE: 140 MMHG | HEIGHT: 62 IN | HEART RATE: 67 BPM | BODY MASS INDEX: 51.93 KG/M2

## 2019-04-29 DIAGNOSIS — M25.511 CHRONIC RIGHT SHOULDER PAIN: ICD-10-CM

## 2019-04-29 DIAGNOSIS — G89.29 CHRONIC RIGHT SHOULDER PAIN: ICD-10-CM

## 2019-04-29 DIAGNOSIS — M19.011 PRIMARY OSTEOARTHRITIS OF RIGHT SHOULDER: Primary | ICD-10-CM

## 2019-04-29 RX ORDER — IBUPROFEN 200 MG
CAPSULE ORAL
COMMUNITY
End: 2019-08-08 | Stop reason: SDUPTHER

## 2019-04-29 RX ORDER — BETAMETHASONE SODIUM PHOSPHATE AND BETAMETHASONE ACETATE 3; 3 MG/ML; MG/ML
6 INJECTION, SUSPENSION INTRA-ARTICULAR; INTRALESIONAL; INTRAMUSCULAR; SOFT TISSUE ONCE
Qty: 0.5 ML | Refills: 0
Start: 2019-04-29 | End: 2019-04-29

## 2019-04-29 RX ORDER — BETAMETHASONE SODIUM PHOSPHATE AND BETAMETHASONE ACETATE 3; 3 MG/ML; MG/ML
6 INJECTION, SUSPENSION INTRA-ARTICULAR; INTRALESIONAL; INTRAMUSCULAR; SOFT TISSUE ONCE
Qty: 0.5 ML | Refills: 0
Start: 2019-04-29 | End: 2019-04-29 | Stop reason: CLARIF

## 2019-04-29 NOTE — LETTER
4/29/2019 4:36 PM 
 
Ms. Joce Osman [facility] [id number] THE ABOVE PATIENT WAS SEEN BY ME TODAY.  
 
 
Sincerely, 
 
 
Pily Klein MD

## 2019-04-29 NOTE — PROGRESS NOTES
Patient: Mallika Gonzalez                MRN: [id number]       SSN: [id number] YOB: 1970        AGE: 51 y.o. SEX: female PCP: CHRISSY Lord 
04/29/19 Chief Complaint Patient presents with  Shoulder Pain Right HISTORY:  Mallika Gonzalez is a 51 y.o. female who is seen for right shoulder pain. She reports increased right shoulder pain and stiffness for about one month. She reports an aching pain in her right shoulder especially while working. She denies any previous shoulder injury or trauma. She takes Ibuprofen with good relief but notes the pain returns. She has difficulty dressing and undressing herself as well as difficultly removing her right arm from her sleeve. She was previously seen for bilateral foot pain (L>R). She states that she was seen by a PA at the [facility] ED last week for increased heel pain. She states that she has had increased lower back pain recently as well. She notes pain with standing and walking. 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 She left that job because she couldn't stand for long periods of time. She is left-handed. She weighs 282 pounds and is 5'2\". She notes that she has gained weight recently. Weight Metrics 4/29/2019 2/19/2019 1/17/2019 11/12/2018 11/10/2018 10/29/2018 9/20/2018 Weight 282 lb 3.2 oz 272 lb 6.4 oz 268 lb 12.8 oz 272 lb 270 lb 272 lb 6.4 oz 272 lb 12.8 oz  
BMI 51.62 kg/m2 49.82 kg/m2 49.16 kg/m2 49.75 kg/m2 49.38 kg/m2 49.82 kg/m2 49.9 kg/m2 Patient Active Problem List  
Diagnosis Code  Essential hypertension I10  
 Prediabetes R73.03  
 Morbid obesity with BMI of 45.0-49.9, adult ([facility]) E66.01, Z68.42 REVIEW OF SYSTEMS: All Below are Negative except: See HPI Constitutional: negative for fever, chills, and weight loss. Cardiovascular: negative for chest pain, claudication, leg swelling, SOB, MENENDEZ Gastrointestinal: Negative for pain, N/V/C/D, Blood in stool or urine, dysuria,  hematuria, incontinence, pelvic pain. Musculoskeletal: See HPI Neurological: Negative for dizziness and weakness. Negative for headaches, Visual changes, confusion, seizures Phychiatric/Behavioral: Negative for depression, memory loss, substance  abuse. Extremities: Negative for hair changes, rash, or skin lesion changes. Hematologic: Negative for bleeding problems, bruising, pallor or swollen lymph  nodes Peripheral Vascular: No calf pain, no circulation deficits. Social History Socioeconomic History  Marital status: SINGLE Spouse name: Not on file  Number of children: Not on file  Years of education: Not on file  Highest education level: Not on file Occupational History  Not on file Social Needs  Financial resource strain: Not on file  Food insecurity:  
  Worry: Not on file Inability: Not on file  Transportation needs:  
  Medical: Not on file Non-medical: Not on file Tobacco Use  Smoking status: Never Smoker  Smokeless tobacco: Never Used Substance and Sexual Activity  Alcohol use: Yes Comment: rare  Drug use: No  
 Sexual activity: Not Currently Partners: Male Birth control/protection: Surgical  
  Comment: tubal ligation Lifestyle  Physical activity:  
  Days per week: Not on file Minutes per session: Not on file  Stress: Not on file Relationships  Social connections:  
  Talks on phone: Not on file Gets together: Not on file Attends Christianity service: Not on file Active member of club or organization: Not on file Attends meetings of clubs or organizations: Not on file Relationship status: Not on file  Intimate partner violence:  
  Fear of current or ex partner: Not on file Emotionally abused: Not on file Physically abused: Not on file Forced sexual activity: Not on file Other Topics Concern  Not on file Social History Narrative  Not on file No Known Allergies Current Outpatient Medications Medication Sig  ibuprofen 200 mg cap Take  by mouth. Indications: Patient takes 800mg by mouth daily  metoprolol tartrate (LOPRESSOR) 100 mg IR tablet Take 1 Tab by mouth daily for 180 days.  lisinopril-hydroCHLOROthiazide (PRINZIDE, ZESTORETIC) 20-25 mg per tablet TAKE 1 TABLET BY MOUTH DAILY  D3-2000 2,000 unit cap capsule TAKE 1 CAPSULE BY MOUTH DAILY  chlorpheniramine-HYDROcodone (TUSSIONEX PENNKINETIC ER) 10-8 mg/5 mL suspension Take 5 mL by mouth every twelve (12) hours as needed for Cough. Max Daily Amount: 10 mL. No current facility-administered medications for this visit. PHYSICAL EXAMINATION: 
Visit Vitals /85 Pulse 67 Temp 97.8 °F (36.6 °C) (Oral) Resp 18 Ht 5' 2\" (1.575 m) Wt 282 lb 3.2 oz (128 kg) SpO2 100% BMI 51.62 kg/m² ORTHO EXAMINATION: 
Examination Right shoulder Left shoulder Skin Intact Intact Effusion - - Biceps deformity - - Atrophy - -  
AC joint tenderness - - Acromial tenderness + + Biceps tenderness - - Forward flexion/Elevation  160 Active abduction  160 External rotation ROM 30 25 Internal rotation ROM 70 85 Apprehension - - Impingement - + Drop Arm Test - - Neurovascular Intact Intact Left shoulder crepitation with motion Examination Right Ankle/Foot Left Ankle/Foot Skin Intact Intact Swelling - - Dorsiflexion 0 0 Plantarflexion 30 30 Deformity - - Inversion laxity - - Anterior drawer - - Medial tenderness - - Lateral tenderness - - Heel cord Intact Intact Sensation Intact Intact Bunion - - Toe nails Normal Normal  
Capillary refill Normal Normal  
Dorsal tenderness, plantar heel tenderness Chart reviewed for the following: Calos Franco MD, have reviewed the History, Physical and updated the Allergic reactions for Dent Deep TIME OUT performed immediately prior to start of procedure: 
Calos Franco MD, have performed the following reviews on Paul Deep prior to the start of the procedure: 
         
* Patient was identified by name and date of birth * Agreement on procedure being performed was verified * Risks and Benefits explained to the patient * Procedure site verified and marked as necessary * Patient was positioned for comfort * Consent was obtained Time: 4:32 PM  
 
Date of procedure: 4/29/2019 Procedure performed by:  Alexsandra Nuñez MD 
 
Ms. Ohara tolerated the procedure well with no complications. RADIOGRAPHS: 
XR RT SHOULDER 4/29/19 
-I have independently reviewed these images during this office visit. -Dr. Anyaa [place] IMPRESSION:  Three views - No fractures, no acromioclavicular narrowing, no glenohumeral narrowing, no calcific densities. XR LEFT SHOULDER 11/28/16 IMPRESSION:  No fractures, no acromioclavicular narrowing, no glenohumeral narrowing, no calcific densities, slight irregularity at the rotator cuff insertion site at the greater tuberosity. XR LEFT FOOT 7/28/16 IMPRESSION:  Generalized, nonspecific mild to moderate soft tissue swelling in left foot. Small inferior calcaneal spur, redemonstrated. There is no other diagnostic finding or interval change in left foot. IMPRESSION:   
  ICD-10-CM ICD-9-CM 1. Primary osteoarthritis of right shoulder M19.011 715.11 betamethasone (CELESTONE SOLUSPAN) 6 mg/mL injection BETAMETHASONE ACETATE & SODIUM PHOSPHATE INJECTION 3 MG EA.  
   DRAIN/INJECT LARGE JOINT/BURSA 2. Chronic right shoulder pain M25.511 719.41 AMB POC XRAY, SHOULDER; COMPLETE, 2+  
 G89.29 338.29 betamethasone (CELESTONE SOLUSPAN) 6 mg/mL injection    BETAMETHASONE ACETATE & SODIUM PHOSPHATE INJECTION 3 MG EA.  
 DRAIN/INJECT LARGE JOINT/BURSA PLAN: After discussing treatment options, patient's right shoulder was injected with 4 cc Marcaine and 1/2 cc Celestone. Provided a work note today. We discussed a possible need for right shoulder MRI with arthrogram in the future if pain continues. She will follow up as needed.   
 
Scribed by Serafin Coleman ([address] [place] [address] [address]) as dictated by Isaías David MD

## 2019-06-26 ENCOUNTER — OFFICE VISIT (OUTPATIENT)
Dept: FAMILY MEDICINE CLINIC | Age: 49
End: 2019-06-26

## 2019-06-26 VITALS
HEART RATE: 72 BPM | TEMPERATURE: 98.2 F | SYSTOLIC BLOOD PRESSURE: 116 MMHG | BODY MASS INDEX: 50.97 KG/M2 | RESPIRATION RATE: 16 BRPM | DIASTOLIC BLOOD PRESSURE: 82 MMHG | OXYGEN SATURATION: 98 % | HEIGHT: 62 IN | WEIGHT: 277 LBS

## 2019-06-26 DIAGNOSIS — Z13.31 POSITIVE DEPRESSION SCREENING: ICD-10-CM

## 2019-06-26 DIAGNOSIS — J02.9 SORE THROAT: Primary | ICD-10-CM

## 2019-06-26 LAB
S PYO AG THROAT QL: NEGATIVE
VALID INTERNAL CONTROL?: YES

## 2019-06-26 NOTE — LETTER
NOTIFICATION RETURN TO WORK  
 
6/26/2019 11:51 AM 
 
Ms. Kayla Young [facility] [address] To Whom It May Concern: 
 
Kayla Young is currently under the care of [facility]. She will return to work on 6/28/19 If there are questions or concerns please have the patient contact our office.  
 
 
 
Sincerely, 
 
 
 
CHRISSY Anne

## 2019-06-26 NOTE — PROGRESS NOTES
1. Have you been to the ER, urgent care clinic since your last visit? Hospitalized since your last visit? No    2. Have you seen or consulted any other health care providers outside of the [address] [place] [address] since your last visit? Include any pap smears or colon screening.   No

## 2019-06-26 NOTE — PROGRESS NOTES
Patient: Kayla Young MRN: [id number]  SSN: [id number]    YOB: 1970  Age: 51 y.o. Sex: female      Date of Service: 6/26/2019   Provider: CHRISSY Anne   Office Location:   [address] [facility] [address], [address]  Office Phone: [phone]  Office Fax: [phone]        REASON FOR VISIT:   Chief Complaint   Patient presents with    Headache    Ear Drainage    Sore Throat        VITALS:   Visit Vitals  /82 (BP 1 Location: Left arm, BP Patient Position: Sitting)   Pulse 72   Temp 98.2 °F (36.8 °C) (Oral)   Resp 16   Ht 5' 2\" (1.575 m)   Wt 277 lb (125.6 kg)   LMP 05/25/2019   SpO2 98%   BMI 50.66 kg/m²       MEDICATIONS:   Current Outpatient Medications   Medication Sig Dispense Refill    ibuprofen 200 mg cap Take  by mouth. Indications: Patient takes 800mg by mouth daily      metoprolol tartrate (LOPRESSOR) 100 mg IR tablet Take 1 Tab by mouth daily for 180 days. 90 Tab 1    lisinopril-hydroCHLOROthiazide (PRINZIDE, ZESTORETIC) 20-25 mg per tablet TAKE 1 TABLET BY MOUTH DAILY 90 Tab 1    D3-2000 2,000 unit cap capsule TAKE 1 CAPSULE BY MOUTH DAILY 30 Cap 5        ALLERGIES:   No Known Allergies     ACTIVE MEDICAL PROBLEMS:  Patient Active Problem List   Diagnosis Code    Essential hypertension I10    Prediabetes R73.03    Morbid obesity with BMI of 45.0-49.9, adult ([facility] E66.01, Z68.42          HISTORY OF PRESENT ILLNESS:   Kayla Young is a 51 y.o. female who presents to the office for acute care. URI -   Here today with complains of nasal congestion, b/l ear pressure and itching, headache, mild sore throat, and slight cough x 2-3 days. She is mainly here to r/o strep throat as several of her family members have had it within the past week. She has been taking OTC Tylenol for the headache with some relief. Denies fevers, chills, sinus pain, chest pain, or SOB.      Positive Depression Screen -  PHQ+ noted on intake today. Patient reports this has been largely situational as two weeks ago, she found her  dead in her backyard. They think he may have had a heart attack while cutting down some branches. This has been somewhat of a traumatizing experience, and patient reports she is still working through it. She has a good support system and does not feel she needs therapy at this time. 3 most recent PHQ Screens 6/26/2019   PHQ Not Done -   Little interest or pleasure in doing things Not at all   Feeling down, depressed, irritable, or hopeless Nearly every day   Total Score PHQ 2 3   Trouble falling or staying asleep, or sleeping too much Nearly every day   Feeling tired or having little energy More than half the days   Poor appetite, weight loss, or overeating Nearly every day   Feeling bad about yourself - or that you are a failure or have let yourself or your family down Nearly every day   Trouble concentrating on things such as school, work, reading, or watching TV Nearly every day   Moving or speaking so slowly that other people could have noticed; or the opposite being so fidgety that others notice Not at all   Thoughts of being better off dead, or hurting yourself in some way Not at all   PHQ 9 Score 17        REVIEW OF SYSTEMS:  ROS as noted in HPI     PHYSICAL EXAMINATION:  Physical Exam   Constitutional: She is oriented to person, place, and time and well-developed, well-nourished, and in no distress. HENT:   Head: Normocephalic and atraumatic. Right Ear: Tympanic membrane normal.   Left Ear: Tympanic membrane normal.   Nose: Mucosal edema present. Mouth/Throat: Uvula is midline and mucous membranes are normal.   chronic tonsillar hypertrophy b/l   Eyes: Pupils are equal, round, and reactive to light. Conjunctivae are normal. Right eye exhibits no discharge. Left eye exhibits no discharge. Neck: Neck supple. Cardiovascular: Normal rate, regular rhythm and normal heart sounds.  Exam reveals no gallop and no friction rub. No murmur heard. Pulmonary/Chest: Effort normal and breath sounds normal. She has no wheezes. She has no rales. Lymphadenopathy:     She has no cervical adenopathy. Neurological: She is alert and oriented to person, place, and time. Gait normal.   Skin: Skin is warm and dry. No rash noted. Psychiatric: Mood, memory and affect normal.        RESULTS:  Results for orders placed or performed in visit on 06/26/19   AMB POC RAPID STREP A   Result Value Ref Range    VALID INTERNAL CONTROL POC Yes     Group A Strep Ag Negative Negative        ASSESSMENT/PLAN:  Diagnoses and all orders for this visit:    1. Sore throat  - Rapid strep negative  - Suspect viral URI, possibly exacerbated by seasonal allergies  - Suggested supportive care with OTC antihistamines/cold medications as needed  - Patient to call if symptoms persist or worsen  Orders:    -     AMB POC RAPID STREP A    2. Positive depression screening  - Seems situational in nature. Emotional support provided and advised patient that I would be happy to discuss further with her if depressive symptoms persist beyond another week or two. She may call at any time to schedule a follow up or request a referral to therapy. She declines treatment at this time       Follow-up and Dispositions    · Return in about 6 weeks (around 8/7/2019) for routine care. Fasting labs due 3-7 days prior  . All questions answered. Patient expresses understanding and agrees with the plan as detailed above.            PATIENT CARE TEAM:   Patient Care Team:  CHRISSY Ford as PCP - General (Physician Assistant)       CHRISSY Pereyra   June 26, 2019   12:33 PM

## 2019-06-26 NOTE — PROGRESS NOTES
1. Have you been to the ER, urgent care clinic since your last visit? Hospitalized since your last visit? No    2. Have you seen or consulted any other health care providers outside of the [address] [place] [address] since your last visit? Include any pap smears or colon screening.   No    3 most recent PHQ Screens 6/26/2019   PHQ Not Done -   Little interest or pleasure in doing things Not at all   Feeling down, depressed, irritable, or hopeless Nearly every day   Total Score PHQ 2 3   Trouble falling or staying asleep, or sleeping too much Nearly every day   Feeling tired or having little energy More than half the days   Poor appetite, weight loss, or overeating Nearly every day   Feeling bad about yourself - or that you are a failure or have let yourself or your family down Nearly every day   Trouble concentrating on things such as school, work, reading, or watching TV Nearly every day   Moving or speaking so slowly that other people could have noticed; or the opposite being so fidgety that others notice Not at all   Thoughts of being better off dead, or hurting yourself in some way Not at all   PHQ 9 Score 17

## 2019-07-08 ENCOUNTER — OFFICE VISIT (OUTPATIENT)
Dept: ORTHOPEDIC SURGERY | Facility: CLINIC | Age: 49
End: 2019-07-08

## 2019-07-08 VITALS
DIASTOLIC BLOOD PRESSURE: 81 MMHG | TEMPERATURE: 98.7 F | HEIGHT: 62 IN | BODY MASS INDEX: 50.66 KG/M2 | HEART RATE: 76 BPM | RESPIRATION RATE: 18 BRPM | SYSTOLIC BLOOD PRESSURE: 140 MMHG | OXYGEN SATURATION: 99 %

## 2019-07-08 DIAGNOSIS — S82.001A CLOSED NONDISPLACED FRACTURE OF RIGHT PATELLA, UNSPECIFIED FRACTURE MORPHOLOGY, INITIAL ENCOUNTER: Primary | ICD-10-CM

## 2019-07-08 DIAGNOSIS — S63.681A OTHER SPRAIN OF RIGHT THUMB, INITIAL ENCOUNTER: ICD-10-CM

## 2019-07-08 DIAGNOSIS — M79.644 THUMB PAIN, RIGHT: ICD-10-CM

## 2019-07-08 NOTE — LETTER
7/8/2019 1:40 PM 
 
Ms. Mario Mae [address] [id number] Full work restrictions for injuries to the Thigh, Knee, Leg PATIENT'S NAME: Mario Mae   DATE: 7/8/2019 LIFTING:   The patient can lift no more than 20 pounds. CLIMBING:   The patient can climb no ladders or stairs WALKING/STANDING The patient can walk or stand no more than 1 hour at a time. The patient cannot walk on uneven ground or on scaffolding. KNEELING/SQUATTING The patient cannot kneel or squat These restrictions are in effect for the above named patient From: 7-15-19  TO:7-29-19 PLEASE EXCUSE THE ABOVE PATIENT FROM WORK UNTIL 7-15-19. SHE IS TO RETURN TO OFFICE 7-29-19.  
 
 
 
Sincerely, 
 
 
 
Marcela Mai MD

## 2019-07-08 NOTE — PROGRESS NOTES
Patient: Cory Sorenson                MRN: [id number]       SSN: [id number]  YOB: 1970        AGE: 51 y.o. SEX: female    PCP: CHRISSY aCstillo  07/08/19    Chief Complaint   Patient presents with    Knee Pain     Right    Hand Pain     Right Thumb     HISTORY:  Cory Sorenson is a 51 y.o. female who sustained a right knee and right thumb injury on 7/3/19. She fell when she missed a step as she was going down stairs outside [facility] in [place]. She landed on her outstretched right hand and knees. She was seen at [facility] on 7/4/19 where right knee x rays revealed patellar fracture. She was provided a knee immobilizer and referred for orthopedic consultation. She has been experiencing pain and swelling in her knee since the injury. She notes pain primarily in the base of her right thumb. She is going on a cruise soon and would like to feel better prior to her trip. She was previously seen for right shoulder pain. She reports increased right shoulder pain and stiffness for about one month. She reports an aching pain in her right shoulder especially while working. She denies any previous shoulder injury or trauma. She takes Ibuprofen with good relief but notes the pain returns. She has difficulty dressing and undressing herself as well as difficultly removing her right arm from her sleeve. She was previously seen for bilateral foot pain (L>R). She states that she was seen by a PA at the [facility] ED for increased heel pain. She states that she has had increased lower back pain recently as well. She notes pain with standing and walking. 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 She left that job because she couldn't stand for long periods of time. She is left-handed. She weighs 282 pounds and is 5'2\". She notes that she has gained weight recently. She will leave on her cruise from [place] with her family next month. Weight Metrics 7/8/2019 6/26/2019 4/29/2019 2/19/2019 1/17/2019 11/12/2018 11/10/2018   Weight - 277 lb 282 lb 3.2 oz 272 lb 6.4 oz 268 lb 12.8 oz 272 lb 270 lb   BMI 50.66 kg/m2 50.66 kg/m2 51.62 kg/m2 49.82 kg/m2 49.16 kg/m2 49.75 kg/m2 49.38 kg/m2     Patient Active Problem List   Diagnosis Code    Essential hypertension I10    Prediabetes R73.03    Morbid obesity with BMI of 45.0-49.9, adult ([facility] [address] E66.01, Z68.42     REVIEW OF SYSTEMS: All Below are Negative except: See HPI   Constitutional: negative for fever, chills, and weight loss. Cardiovascular: negative for chest pain, claudication, leg swelling, SOB, MENENDEZ   Gastrointestinal: Negative for pain, N/V/C/D, Blood in stool or urine, dysuria,  hematuria, incontinence, pelvic pain. Musculoskeletal: See HPI   Neurological: Negative for dizziness and weakness. Negative for headaches, Visual changes, confusion, seizures   Phychiatric/Behavioral: Negative for depression, memory loss, substance  abuse. Extremities: Negative for hair changes, rash, or skin lesion changes. Hematologic: Negative for bleeding problems, bruising, pallor or swollen lymph  nodes   Peripheral Vascular: No calf pain, no circulation deficits.     Social History     Socioeconomic History    Marital status: SINGLE     Spouse name: Not on file    Number of children: Not on file    Years of education: Not on file    Highest education level: Not on file   Occupational History    Not on file   Social Needs    Financial resource strain: Not on file    Food insecurity:     Worry: Not on file     Inability: Not on file    Transportation needs:     Medical: Not on file     Non-medical: Not on file   Tobacco Use    Smoking status: Never Smoker    Smokeless tobacco: Never Used   Substance and Sexual Activity    Alcohol use: Yes     Comment: rare    Drug use: No    Sexual activity: Not Currently     Partners: Male     Birth control/protection: Surgical     Comment: tubal ligation   Lifestyle    Physical activity:     Days per week: Not on file     Minutes per session: Not on file    Stress: Not on file   Relationships    Social connections:     Talks on phone: Not on file     Gets together: Not on file     Attends Scientology service: Not on file     Active member of club or organization: Not on file     Attends meetings of clubs or organizations: Not on file     Relationship status: Not on file    Intimate partner violence:     Fear of current or ex partner: Not on file     Emotionally abused: Not on file     Physically abused: Not on file     Forced sexual activity: Not on file   Other Topics Concern    Not on file   Social History Narrative    Not on file      No Known Allergies   Current Outpatient Medications   Medication Sig    ibuprofen 200 mg cap Take  by mouth. Indications: Patient takes 800mg by mouth daily    metoprolol tartrate (LOPRESSOR) 100 mg IR tablet Take 1 Tab by mouth daily for 180 days.  lisinopril-hydroCHLOROthiazide (PRINZIDE, ZESTORETIC) 20-25 mg per tablet TAKE 1 TABLET BY MOUTH DAILY    D3-2000 2,000 unit cap capsule TAKE 1 CAPSULE BY MOUTH DAILY     No current facility-administered medications for this visit.        PHYSICAL EXAMINATION:  Visit Vitals  /81   Pulse 76   Temp 98.7 °F (37.1 °C) (Oral)   Resp 18   Ht 5' 2\" (1.575 m)   SpO2 99%   BMI 50.66 kg/m²     ORTHO EXAMINATION:  Examination Right shoulder Left shoulder   Skin Intact Intact   Effusion - -   Biceps deformity - -   Atrophy - -   AC joint tenderness - -   Acromial tenderness + +   Biceps tenderness - -   Forward flexion/Elevation  160   Active abduction  160   External rotation ROM 30 25   Internal rotation ROM 70 85   Apprehension - -   Impingement - +   Drop Arm Test - -   Neurovascular Intact Intact   Left shoulder crepitation with motion    Examination Right Ankle/Foot Left Ankle/Foot   Skin Intact Intact   Swelling - -   Dorsiflexion 0 0   Plantarflexion 30 30   Deformity - -   Inversion laxity - -   Anterior drawer - -   Medial tenderness - -   Lateral tenderness - -   Heel cord Intact Intact   Sensation Intact Intact   Bunion - -   Toe nails Normal Normal   Capillary refill Normal Normal   Dorsal tenderness, plantar heel tenderness    Examination Right knee Left knee   Skin Intact Intact   Range of motion 120-0 120-0   Effusion ++ -   Medial joint line tenderness + -   Lateral joint line tenderness - -   Popliteal tenderness - -   Osteophytes palpable - -   Giulias - -   Patella crepitus - -   Anterior drawer - -   Lateral laxity - -   Medial laxity - -   Varus deformity - -   Valgus deformity - -   Pretibial edema + -   Calf tenderness - -      using a walker    Examination Right Hand Left Hand   Skin Intact Intact   Deformity - -   Swelling - -   Tenderness + basal joint -   Finger flexion Full Full   Finger extension Full Full   Sensation Normal Normal   Capillary refill Normal Normal   Heberden's nodes - -   Dupuytren's - -        CT LOWER EXT W/O CONT  IMPRESSION  1. Acute, comminuted patellar fracture with no significant displacement of any fracture fragment component. Patellofemoral alignment is maintained. There is an associated probable hemarthrosis. 2. Degenerative changes of the tibial plateau with no acute tibial fracture as questioned on radiographs.      RADIOGRAPHS:  XR RIGHT THUMB 7/8/19 STEFAN  IMPRESSION:  Three views - No fractures, no joint space narrowing. XR RIGHT KNEE 7/4/19 HAILEE CAMPOVERDECLIF  IMPRESSION  Suspected lateral tibial plateau impaction fracture. Possible patellar fracture. Large, dense suprapatellar joint effusion possibly representing hemarthrosis. Further evaluation with CT is recommended.  -I have independently reviewed these images during this office visit. -Dr. Joseph Ibrahim:  Three views - No fractures, + effusion, near complete medial joint space narrowing, + osteophytes present. Kellgren Emerson grade 4, nondisplaced patellar fracture     XR RT SHOULDER 4/29/19  -I have independently reviewed these images during this office visit. -Dr. Joseph Ibrahim:  Three views - No fractures, no acromioclavicular narrowing, no glenohumeral narrowing, no calcific densities. XR LEFT SHOULDER 11/28/16  IMPRESSION:  No fractures, no acromioclavicular narrowing, no glenohumeral narrowing, no calcific densities, slight irregularity at the rotator cuff insertion site at the greater tuberosity. XR LEFT FOOT 7/28/16  IMPRESSION:  Generalized, nonspecific mild to moderate soft tissue swelling in left foot. Small inferior calcaneal spur, redemonstrated. There is no other diagnostic finding or interval change in left foot. IMPRESSION:      ICD-10-CM ICD-9-CM    1. Closed nondisplaced fracture of right patella, unspecified fracture morphology, initial encounter S82.001A 822.0 [place] CLOSED TX PATELLA FX   2. Thumb pain, right M79.644 729.5 AMB POC XRAY, FINGER(S), 2+ VIEWS   3. Other sprain of right thumb, initial encounter G35.517M 842.19         PLAN:  Provided a work note to stay out X 1 week. She will be placed on full knee restrictions starting 7/15/19. She will follow up in 2 weeks.     Scribed by Gordo Pizarro  ([address]) as dictated by Tanesha Gomez MD

## 2019-07-12 ENCOUNTER — TELEPHONE (OUTPATIENT)
Dept: FAMILY MEDICINE CLINIC | Age: 49
End: 2019-07-12

## 2019-07-12 NOTE — TELEPHONE ENCOUNTER
Patient is calling in to request a script for a knee mobilizer. She states she fell and fractured her right knee. Patient would a written script to be picked up.  Patient can be reached at [phone]

## 2019-07-29 ENCOUNTER — OFFICE VISIT (OUTPATIENT)
Dept: ORTHOPEDIC SURGERY | Facility: CLINIC | Age: 49
End: 2019-07-29

## 2019-07-29 VITALS
WEIGHT: 277 LBS | TEMPERATURE: 97.8 F | HEART RATE: 80 BPM | DIASTOLIC BLOOD PRESSURE: 61 MMHG | OXYGEN SATURATION: 98 % | RESPIRATION RATE: 16 BRPM | BODY MASS INDEX: 50.97 KG/M2 | HEIGHT: 62 IN | SYSTOLIC BLOOD PRESSURE: 106 MMHG

## 2019-07-29 DIAGNOSIS — S82.001A CLOSED NONDISPLACED FRACTURE OF RIGHT PATELLA, UNSPECIFIED FRACTURE MORPHOLOGY, INITIAL ENCOUNTER: ICD-10-CM

## 2019-07-29 DIAGNOSIS — M79.644 THUMB PAIN, RIGHT: Primary | ICD-10-CM

## 2019-07-29 NOTE — PROGRESS NOTES
Patient: Radha Noble                MRN: [id number]       SSN: [id number]  YOB: 1970        AGE: 51 y.o. SEX: female    PCP: CHRISSY Hays  07/29/19 7/29/19: Follow up fall slightly improved pain to right knee, brace not staying on. Patient has discontinued. Thumb pain at base. ROM fair. Improved overall. Chief Complaint   Patient presents with    Knee Pain     right knee pain    Thumb Pain     right thumb pain     HISTORY:  Radha Noble is a 51 y.o. female who sustained a right knee and right thumb injury on 7/3/19. She fell when she missed a step as she was going down stairs outside [facility] in [place]. She landed on her outstretched right hand and knees. She was seen at [facility] on 7/4/19 where right knee x rays revealed patellar fracture. She was provided a knee immobilizer and referred for orthopedic consultation. She has been experiencing pain and swelling in her knee since the injury. She notes pain primarily in the base of her right thumb. She is going on a cruise soon and would like to feel better prior to her trip. She was previously seen for right shoulder pain. She reports increased right shoulder pain and stiffness for about one month. She reports an aching pain in her right shoulder especially while working. She denies any previous shoulder injury or trauma. She takes Ibuprofen with good relief but notes the pain returns. She has difficulty dressing and undressing herself as well as difficultly removing her right arm from her sleeve. She was previously seen for bilateral foot pain (L>R). She states that she was seen by a PA at the [facility] ED for increased heel pain. She states that she has had increased lower back pain recently as well. She notes pain with standing and walking.       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 She is left-handed. She weighs 282 pounds and is 5'2\". She notes that she has gained weight recently. She will leave on her cruise from [place] with her family next month. Weight Metrics 7/29/2019 7/8/2019 6/26/2019 4/29/2019 2/19/2019 1/17/2019 11/12/2018   Weight 277 lb - 277 lb 282 lb 3.2 oz 272 lb 6.4 oz 268 lb 12.8 oz 272 lb   BMI 50.66 kg/m2 50.66 kg/m2 50.66 kg/m2 51.62 kg/m2 49.82 kg/m2 49.16 kg/m2 49.75 kg/m2     Patient Active Problem List   Diagnosis Code    Essential hypertension I10    Prediabetes R73.03    Morbid [facility] with [facility] of 45.0-49.9, [facility] E66.01, Z68.42     REVIEW OF SYSTEMS: All Below are Negative except: See HPI   Constitutional: negative for fever, chills, and weight loss. Cardiovascular: negative for chest pain, claudication, leg swelling, SOB, MENENDEZ   Gastrointestinal: Negative for pain, N/V/C/D, Blood in stool or urine, dysuria,  hematuria, incontinence, pelvic pain. Musculoskeletal: See HPI   Neurological: Negative for dizziness and weakness. Negative for headaches, Visual changes, confusion, seizures   Phychiatric/Behavioral: Negative for depression, memory loss, substance  abuse. Extremities: Negative for hair changes, rash, or skin lesion changes.    Hematologic: Negative for bleeding problems, bruising, pallor or swollen lymph  nodes   Peripheral Vascular: No calf pain, no circulation deficits. Social History     Socioeconomic History    Marital status: SINGLE     Spouse name: Not on file    Number of children: Not on file    Years of education: Not on file    Highest education level: Not on file   Occupational History    Not on file   Social Needs    Financial resource strain: Not on file    Food insecurity:     Worry: Not on file     Inability: Not on file    Transportation needs:     Medical: Not on file     Non-medical: Not on file   Tobacco Use    Smoking status: Never Smoker    Smokeless tobacco: Never Used   Substance and Sexual Activity    Alcohol use: Yes     Comment: rare    Drug use: No    Sexual activity: Not Currently     Partners: Male     Birth control/protection: Surgical     Comment: tubal ligation   Lifestyle    Physical activity:     Days per week: Not on file     Minutes per session: Not on file    Stress: Not on file   Relationships    Social connections:     Talks on phone: Not on file     Gets together: Not on file     Attends Episcopal service: Not on file     Active member of club or organization: Not on file     Attends meetings of clubs or organizations: Not on file     Relationship status: Not on file    Intimate partner violence:     Fear of current or ex partner: Not on file     Emotionally abused: Not on file     Physically abused: Not on file     Forced sexual activity: Not on file   Other Topics Concern    Not on file   Social History Narrative    Not on file      No Known Allergies   Current Outpatient Medications   Medication Sig    ibuprofen 200 mg cap Take  by mouth. Indications: Patient takes 800mg by mouth daily    metoprolol tartrate (LOPRESSOR) 100 mg IR tablet Take 1 Tab by mouth daily for 180 days.     lisinopril-hydroCHLOROthiazide (PRINZIDE, ZESTORETIC) 20-25 mg per tablet TAKE 1 TABLET BY MOUTH DAILY    D3-2000 2,000 unit cap capsule TAKE 1 CAPSULE BY MOUTH DAILY No current facility-administered medications for this visit. PHYSICAL EXAMINATION:  Visit Vitals  /61 (BP 1 Location: Left arm, BP Patient Position: Sitting)   Pulse 80   Temp 97.8 °F (36.6 °C) (Oral)   Resp 16   Ht 5' 2\" (1.575 m)   Wt 277 lb (125.6 kg)   SpO2 98%   BMI 50.66 kg/m²     ORTHO EXAMINATION:  Examination     Skin     Effusion     Biceps deformity     Atrophy     AC joint tenderness     Acromial tenderness     Biceps tenderness     Forward flexion/Elevation ROM     Active abduction ROM     External rotation ROM     Internal rotation ROM     Apprehension     Impingement     Drop Arm Test     Neurovascular         Examination     Skin     Swelling     Dorsiflexion     Plantarflexion     Deformity     Inversion laxity     Anterior drawer     Medial tenderness     Lateral tenderness     Heel cord     Sensation     Bunion     Toe nails     Capillary refill     Dorsal tenderness, plantar heel tenderness    Examination Right knee    Skin Intact    Range of motion 120-0(P)    Effusion Trace    Medial joint line tenderness +    Lateral joint line tenderness -    Popliteal tenderness -    Osteophytes palpable -    Giulias -    Patella crepitus -    Anterior drawer -    Lateral laxity -    Medial laxity -    Varus deformity -    Valgus deformity -    Pretibial edema +    Calf tenderness -          Examination Right Hand    Skin Intact    Deformity -    Swelling -    Tenderness + basal joint    Finger flexion Full    Finger extension Full    Sensation Normal    Capillary refill Normal    Heberden's nodes -    Dupuytren's -         CT LOWER EXT W/O CONT  IMPRESSION  1. Acute, comminuted patellar fracture with no significant displacement of any fracture fragment component. Patellofemoral alignment is maintained. No appreciable change in imaging repeated on 7/29/19    2. Degenerative changes of the tibial plateau with no acute tibial fracture as questioned on radiographs.      RADIOGRAPHS:  XR RIGHT THUMB 7/8/19 STEFAN  IMPRESSION:  Three views - No fractures, no joint space narrowing. XR RIGHT KNEE 7/4/19 HAILEE ANAYA  IMPRESSION  Suspected lateral tibial plateau impaction fracture. Possible patellar fracture. Large, dense suprapatellar joint effusion possibly representing hemarthrosis. Further evaluation with CT is recommended.  -I have independently reviewed these images during this office visit. -Dr. Ruma Meza:  Three views - No fractures, + effusion, near complete medial joint space narrowing, + osteophytes present. Kellgren Emerson grade 4, nondisplaced patellar fracture     XR RT SHOULDER 4/29/19  -I have independently reviewed these images during this office visit. -Dr. Ruma Meza:  Three views - No fractures, no acromioclavicular narrowing, no glenohumeral narrowing, no calcific densities. XR LEFT SHOULDER 11/28/16  IMPRESSION:  No fractures, no acromioclavicular narrowing, no glenohumeral narrowing, no calcific densities, slight irregularity at the rotator cuff insertion site at the greater tuberosity. XR LEFT FOOT 7/28/16  IMPRESSION:  Generalized, nonspecific mild to moderate soft tissue swelling in left foot. Small inferior calcaneal spur, redemonstrated. There is no other diagnostic finding or interval change in left foot. IMPRESSION:      ICD-10-CM ICD-9-CM    1. Thumb pain, right M79.644 729.5     2.)     PLAN:  Provided a work note to stay out till Sept 2nd 2019, OT for Right thumb sprain.

## 2019-07-29 NOTE — PROGRESS NOTES
1. Have you been to the ER, urgent care clinic since your last visit? Hospitalized since your last visit? NO    2. Have you seen or consulted any other health care providers outside of the [address] [place] [address] since your last visit? Include any pap smears or colon screening.  NO

## 2019-07-29 NOTE — LETTER
NOTIFICATION RETURN TO WORK / SCHOOL 
 
7/29/2019 4:37 PM 
 
Ms. Shayy Lee [facility] [address] To Whom It May Concern: 
 
Shayy Lee is currently under the care of [address] [place] [address]. She will return to work/school on: 09/02/2019 If there are questions or concerns please have the patient contact our office.  
 
 
 
Sincerely, 
 
 
Mallory Heath PA-C

## 2019-08-08 ENCOUNTER — OFFICE VISIT (OUTPATIENT)
Dept: FAMILY MEDICINE CLINIC | Age: 49
End: 2019-08-08

## 2019-08-08 VITALS
HEART RATE: 70 BPM | TEMPERATURE: 98.2 F | HEIGHT: 62 IN | WEIGHT: 275 LBS | OXYGEN SATURATION: 98 % | BODY MASS INDEX: 50.61 KG/M2 | SYSTOLIC BLOOD PRESSURE: 134 MMHG | RESPIRATION RATE: 16 BRPM | DIASTOLIC BLOOD PRESSURE: 80 MMHG

## 2019-08-08 DIAGNOSIS — B37.31 CANDIDA VAGINITIS: ICD-10-CM

## 2019-08-08 DIAGNOSIS — R73.03 PREDIABETES: ICD-10-CM

## 2019-08-08 DIAGNOSIS — I10 ESSENTIAL HYPERTENSION: Primary | ICD-10-CM

## 2019-08-08 RX ORDER — METOPROLOL TARTRATE 100 MG/1
100 TABLET ORAL DAILY
Qty: 90 TAB | Refills: 1 | Status: SHIPPED | OUTPATIENT
Start: 2019-08-08 | End: 2019-12-20 | Stop reason: SDUPTHER

## 2019-08-08 RX ORDER — IBUPROFEN 200 MG
CAPSULE ORAL
Qty: 90 CAP | Refills: 1 | Status: SHIPPED | OUTPATIENT
Start: 2019-08-08 | End: 2019-08-09

## 2019-08-08 RX ORDER — LISINOPRIL AND HYDROCHLOROTHIAZIDE 20; 25 MG/1; MG/1
TABLET ORAL
Qty: 90 TAB | Refills: 1 | Status: SHIPPED | OUTPATIENT
Start: 2019-08-08 | End: 2019-12-20 | Stop reason: SDUPTHER

## 2019-08-08 RX ORDER — FLUCONAZOLE 150 MG/1
TABLET ORAL
Qty: 2 TAB | Refills: 0 | Status: SHIPPED | OUTPATIENT
Start: 2019-08-08 | End: 2019-12-20

## 2019-08-08 NOTE — PROGRESS NOTES
1. Have you been to the ER, urgent care clinic since your last visit? Hospitalized since your last visit? Yes July 04,2019 [facility] for rght knee     2. Have you seen or consulted any other health care providers outside of the [address] [place] [address] since your last visit? Include any pap smears or colon screening.  No

## 2019-08-08 NOTE — PROGRESS NOTES
Patient: Cameron Hale MRN: [id number]  SSN: [id number]    YOB: 1970  Age: 51 y.o. Sex: female      Date of Service: 2019   Provider: CHRISSY Trejo   Office Location:   [address] [place] [address] Dr Trae carmen, [address]  Office Phone: [phone]  Office Fax: [phone]      REASON FOR VISIT:   Chief Complaint   Patient presents with    Hypertension        VITALS:   Visit Vitals  /80 (BP 1 Location: Left arm, BP Patient Position: Sitting)   Pulse 70   Temp 98.2 °F (36.8 °C) (Oral)   Resp 16   Ht 5' 2\" (1.575 m)   Wt 275 lb (124.7 kg)   LMP  (Approximate) Comment: 2019   SpO2 98%   BMI 50.30 kg/m²        MEDICATIONS:   Current Outpatient Medications on File Prior to Visit   Medication Sig Dispense Refill    D3-2000 2,000 unit cap capsule TAKE 1 CAPSULE BY MOUTH DAILY 30 Cap 5     No current facility-administered medications on file prior to visit.          ALLERGIES:   No Known Allergies     MEDICAL/SURGICAL HISTORY:  Past Medical History:   Diagnosis Date    Acid reflux     Acute cystitis with hematuria     Heel spur     HTN (hypertension), benign 2009    Irregular menses     Menorrhagia     Plantar fasciitis     Shoulder bursitis     Urticaria     Vulvovaginitis       Past Surgical History:   Procedure Laterality Date    HX CHOLECYSTECTOMY      HX GYN  2003        HX GYN      BTL        FAMILY HISTORY:  Family History   Problem Relation Age of Onset    Cancer Mother     Diabetes Mother     Heart Failure Mother    [facility] Arthritis-rheumatoid Mother     Stroke Father     Hypertension Father     Substance Abuse Father         tobacco use    Alzheimer Father     Cancer Brother     Diabetes Brother     Drug Abuse Brother     Alcohol abuse Maternal Grandfather     Attention Deficit Disorder Daughter     Heart Attack Maternal Aunt         SOCIAL HISTORY:  Social History     Tobacco Use    Smoking status: Never Smoker    Smokeless tobacco: Never Used   Substance Use Topics    Alcohol use: Yes     Comment: rare    Drug use: No             HISTORY OF PRESENT ILLNESS: Talya Castellano is a 51 y.o. female who presents to the office for a routine follow up visit. Hypertension -   Currently, the patient's condition is well controlled.   Reports compliance with the following regimen: metoprolol 100 mg daily, lisinopril-HCTZ 20-25 mg daily  Home BP readings: not checking   Lifestyle modifications: continues to work on weight loss      Prediabetes/Morbid Obesity -  Pre-DM is a new diagnosis based on A1c of 6.1% on 7/25/18. Improved to 5.8% on 11/13/18. She was supposed to have repeat fasting labs drawn in preparation for today's visit, but forgot. Patient reports she has been trying to make some dietary changes and make healthier food choices, cutting back on soda and fast food. However, her exercise habits have been suffering as she recently sustained a fall and fractured her patella, which has left her somewhat immobile as she heals    Yeast Infection -  Finally, patient reports she has had a vaginal yeast infection for the past 2-3 days. Reports she gets these frequently, and that her ob/gyn provider always used to give her Diflucan refills to use as needed. Ob/gyn office has since closed. Patient is requesting a refill today. REVIEW OF SYSTEMS:  Review of Systems   Constitutional: Negative for chills, fever and malaise/fatigue. Respiratory: Negative for cough, shortness of breath and wheezing. Cardiovascular: Negative for chest pain, palpitations and leg swelling. PHYSICAL EXAMINATION:  Physical Exam   Constitutional: She is oriented to person, place, and time and well-developed, well-nourished, and in no distress. Cardiovascular: Normal rate, regular rhythm and normal heart sounds. Exam reveals no gallop and no friction rub. No murmur heard.   Pulmonary/Chest: Effort normal and breath sounds normal. She has no wheezes. She has no rales. Neurological: She is alert and oriented to person, place, and time. Gait normal.   Skin: Skin is warm and dry. No rash noted. Psychiatric: Mood, memory and affect normal.        RESULTS:  No results found for this visit on 08/08/19. ASSESSMENT/PLAN:  Diagnoses and all orders for this visit:    1. Essential hypertension  2. Prediabetes  - BP stable  - Continue to work on diet  - Have fasting labs drawn ASAP  Orders:    -     lisinopril-hydroCHLOROthiazide (PRINZIDE, ZESTORETIC) 20-25 mg per tablet; TAKE 1 TABLET BY MOUTH DAILY  -     metoprolol tartrate (LOPRESSOR) 100 mg IR tablet; Take 1 Tab by mouth daily for 180 days. 3. Candida vaginitis  - Time did not allow for pelvic examination today as patient mentioned this at the very end of her visit  - Will treat empirically with diflucan  - advised patient to return for exam and vaginal swab if not improving after 2 doses  Orders:    -     fluconazole (DIFLUCAN) 150 mg tablet; Take 1 tab PO x 1 dose now. May repeat in 4 days if still symptomatic. Follow-up and Dispositions    · Return in about 1 month (around 9/8/2019) for WWE/Pap. All questions answered. Patient expresses understanding and agrees with the plan as detailed above.     PATIENT CARE TEAM:   Patient Care Team:  CHRISSY Goncalves as PCP - General (Physician Assistant)       CHRISSY Mora   August 8, 2019   3:42 PM

## 2019-08-09 ENCOUNTER — HOSPITAL ENCOUNTER (OUTPATIENT)
Dept: LAB | Age: 49
Discharge: HOME | End: 2019-08-09

## 2019-08-09 ENCOUNTER — TELEPHONE (OUTPATIENT)
Dept: FAMILY MEDICINE CLINIC | Age: 49
End: 2019-08-09

## 2019-08-09 LAB — SENTARA SPECIMEN COL,SENBCF: NORMAL

## 2019-08-09 PROCEDURE — 99001 SPECIMEN HANDLING PT-LAB: CPT

## 2019-08-09 RX ORDER — IBUPROFEN 800 MG/1
800 TABLET ORAL
Qty: 90 TAB | Refills: 1 | Status: SHIPPED | OUTPATIENT
Start: 2019-08-09 | End: 2021-04-22 | Stop reason: SDUPTHER

## 2019-08-09 NOTE — TELEPHONE ENCOUNTER
Patient states she was suppose to get a script foribuprofen 800 mg cap but when she got there she received ibuprofen 200 mg cap. Please advise.  Patient can be reached at [phone]

## 2019-08-10 LAB
AVG GLU, 10930: 123 MG/DL (ref 91–123)
CHOLEST SERPL-MCNC: 138 MG/DL (ref 110–200)
HBA1C MFR BLD HPLC: 5.9 % (ref 4.8–5.9)
HDLC SERPL-MCNC: 2.9 MG/DL (ref 0–5)
HDLC SERPL-MCNC: 48 MG/DL (ref 40–59)
LDLC SERPL CALC-MCNC: 78 MG/DL (ref 50–99)
TRIGL SERPL-MCNC: 62 MG/DL (ref 40–149)
VLDLC SERPL CALC-MCNC: 12 MG/DL (ref 8–30)

## 2019-08-11 NOTE — TELEPHONE ENCOUNTER
Please advise patient that cholesterol looks great, A1c is stable at 5.9%  Continue to work on diet and exercise and f/u as scheduled for well woman

## 2019-08-12 ENCOUNTER — TELEPHONE (OUTPATIENT)
Dept: FAMILY MEDICINE CLINIC | Age: 49
End: 2019-08-12

## 2019-08-12 DIAGNOSIS — T75.3XXA MOTION SICKNESS, INITIAL ENCOUNTER: Primary | ICD-10-CM

## 2019-08-12 RX ORDER — SCOLOPAMINE TRANSDERMAL SYSTEM 1 MG/1
1 PATCH, EXTENDED RELEASE TRANSDERMAL
Qty: 3 PATCH | Refills: 0 | Status: SHIPPED | OUTPATIENT
Start: 2019-08-12 | End: 2019-09-10 | Stop reason: ALTCHOICE

## 2019-08-12 NOTE — TELEPHONE ENCOUNTER
Patient identifiers verified. Patient advised that cholesterol looks great, A1c is stable at 5.9%. Continue to work on diet and exercise and f/u as scheduled for well woman. She voices understanding.

## 2019-08-12 NOTE — TELEPHONE ENCOUNTER
Patient advised that Scopolamine patch sent to [facility]. She should apply her first patch about an hour before she boards the ship, and change it every 72 hours. Patient voices understanding.

## 2019-08-12 NOTE — TELEPHONE ENCOUNTER
Scopolamine patch sent to [facility]. Patient should apply her first patch about an hour before she boards the ship, and change it every 72 hours.

## 2019-08-12 NOTE — TELEPHONE ENCOUNTER
Patient identifiers verified. Anjel Cam advised that cholesterol looks great, A1c is stable at 5.9%. Continue to work on diet and exercise and f/u as scheduled for well woman. She voices understanding. Patient is also requesting medication to take with her on a cruise. She states she has never been on a cruise before and would like a motion sickness medication to take just in case. She will be going on a 7 day cruise that leaves 8/14/19. Her pharmacy is [facility].

## 2019-08-13 ENCOUNTER — TELEPHONE (OUTPATIENT)
Dept: FAMILY MEDICINE CLINIC | Age: 49
End: 2019-08-13

## 2019-08-13 DIAGNOSIS — F40.243 FLYING PHOBIA: Primary | ICD-10-CM

## 2019-08-13 RX ORDER — LORAZEPAM 1 MG/1
TABLET ORAL
Qty: 4 TAB | Refills: 0 | Status: SHIPPED | OUTPATIENT
Start: 2019-08-13 | End: 2019-09-10 | Stop reason: ALTCHOICE

## 2019-08-13 NOTE — TELEPHONE ENCOUNTER
Fax received from [address] [place] [address] meds stating prior auth is required for the scopolamine (TRANSDERM-SCOP) 1 mg over 3 days pt3d     . Submit a PA request  1. Go to key. [id number] and click \"Enter a Key\"  2. Patient last name: Stacey Rios      : [birth year]      Key: [id number]  3.  Click \"start a PA\", complete the form, and \"send to plan\"

## 2019-08-13 NOTE — TELEPHONE ENCOUNTER
Spoke with Rashid Vee aware that Joshua Burdick has given her  a few doses of Ativan to use if needed for airplane travel and that she will need to come to the office to pick this up. Judy voice understanding.

## 2019-08-13 NOTE — TELEPHONE ENCOUNTER
Will print Rx for a few doses of Ativan to use if needed for airplane travel. She will need to come to the office to pick this up.

## 2019-08-13 NOTE — TELEPHONE ENCOUNTER
Pt states that she has going on a trip that involve flying and would like to know if she can get something for anxiety called in to [facility] [phone]. Please advise.

## 2019-08-14 NOTE — TELEPHONE ENCOUNTER
Spoke with Neville, per Neville she is aware that her insurance company does not cover the Transderm-Scop patches and that she has brought something over the counter that the pharmacy recommended.

## 2019-08-19 DIAGNOSIS — R73.03 PREDIABETES: ICD-10-CM

## 2019-08-23 ENCOUNTER — OFFICE VISIT (OUTPATIENT)
Dept: ORTHOPEDIC SURGERY | Facility: CLINIC | Age: 49
End: 2019-08-23

## 2019-08-23 VITALS
HEIGHT: 62 IN | WEIGHT: 276.6 LBS | RESPIRATION RATE: 18 BRPM | SYSTOLIC BLOOD PRESSURE: 122 MMHG | OXYGEN SATURATION: 99 % | TEMPERATURE: 98.4 F | DIASTOLIC BLOOD PRESSURE: 82 MMHG | BODY MASS INDEX: 50.9 KG/M2 | HEART RATE: 72 BPM

## 2019-08-23 DIAGNOSIS — S39.012A STRAIN OF LUMBAR REGION, INITIAL ENCOUNTER: Primary | ICD-10-CM

## 2019-08-23 DIAGNOSIS — S76.112A STRAIN OF LEFT QUADRICEPS, INITIAL ENCOUNTER: ICD-10-CM

## 2019-08-23 DIAGNOSIS — M25.562 ACUTE PAIN OF LEFT KNEE: ICD-10-CM

## 2019-08-23 DIAGNOSIS — S70.02XA CONTUSION OF LEFT HIP, INITIAL ENCOUNTER: ICD-10-CM

## 2019-08-23 DIAGNOSIS — M25.552 ACUTE HIP PAIN, LEFT: ICD-10-CM
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
## 2019-08-23 NOTE — LETTER
NOTIFICATION RETURN TO WORK  
 
8/23/2019 2:48 PM 
 
Ms. Mario Mae [facility] [address] To Whom It May Concern: 
 
Mario Mae is currently under the care of [address] [place] [address]. She will return to work on: Tuesday 9-3-19 If there are questions or concerns please have the patient contact our office.  
 
 
 
Sincerely, 
 
 
 
Parris George MD

## 2019-08-23 NOTE — LETTER
NOTIFICATION RETURN TO WORK  
 
8/23/2019 2:36 PM 
 
Ms. Rowena Palomino [facility] [id number] To Whom It May Concern: 
 
Rowena Palomino is currently under the care of [address] [place] [address]. She will return to work on: Tuesday 9-3019--no restrictions. Please excuse her from work until then. If there are questions or concerns please have the patient contact our office.  
 
 
 
Sincerely, 
 
 
 
Nikolas Dunn MD

## 2019-08-30 ENCOUNTER — OFFICE VISIT (OUTPATIENT)
Dept: ORTHOPEDIC SURGERY | Facility: CLINIC | Age: 49
End: 2019-08-30

## 2019-08-30 VITALS
HEIGHT: 62 IN | SYSTOLIC BLOOD PRESSURE: 124 MMHG | HEART RATE: 67 BPM | OXYGEN SATURATION: 98 % | BODY MASS INDEX: 51.53 KG/M2 | RESPIRATION RATE: 16 BRPM | TEMPERATURE: 96.7 F | DIASTOLIC BLOOD PRESSURE: 82 MMHG | WEIGHT: 280 LBS

## 2019-08-30 DIAGNOSIS — S82.001A CLOSED NONDISPLACED FRACTURE OF RIGHT PATELLA, UNSPECIFIED FRACTURE MORPHOLOGY, INITIAL ENCOUNTER: Primary | ICD-10-CM

## 2019-08-30 NOTE — PROGRESS NOTES
HISTORY OF PRESENT ILLNESS:  Hunter Bowers returns to the office following for a nondisplaced right patella fracture. She is doing fair today. This week has been better for her as far as pain goes. She is full weightbearing of her right lower extremity. RADIOGRAPHS:  X-rays, today, reveal healing noted to the nondisplaced right patella fracture. Her range of motion is guarded. She does have severe varus deformity secondary to underlying, severe, end-stage, medial joint line osteoarthritis. PLAN:   At this point, we are going to allow her to return to work on September 3, 2019. I would like to see her back in about three weeks for an additional x-ray, and if needed, continued conservative treatment for her left knee osteoarthritic symptoms. Today, x-rays were reviewed, and all her questions were answered to her satisfaction.

## 2019-09-03 ENCOUNTER — HOSPITAL ENCOUNTER (OUTPATIENT)
Dept: PHYSICAL THERAPY | Age: 49
Discharge: HOME | End: 2019-09-03
Payer: MEDICAID

## 2019-09-03 PROCEDURE — 97530 THERAPEUTIC ACTIVITIES: CPT

## 2019-09-03 PROCEDURE — 97110 THERAPEUTIC EXERCISES: CPT

## 2019-09-03 PROCEDURE — 97161 PT EVAL LOW COMPLEX 20 MIN: CPT
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
## 2019-09-03 NOTE — PROGRESS NOTES
PT DAILY TREATMENT NOTE 10-18    Patient Name: Domingo Nova  Date:9/3/2019  : 1970  [x]  Patient  Verified  Payor: Chris Yu / Plan: [place] [employer] / Product Type: Managed Care Medicaid /    In time:850  Out time:925  Total Treatment Time (min): 35  Visit #: 1 of 10    Medicare/BCBS Only   Total Timed Codes (min):   1:1 Treatment Time:         Treatment Area: Low back pain [M54.5]  Pain in left hip [M25.552]    SUBJECTIVE  Pain Level (0-10 scale): 8  Any medication changes, allergies to medications, adverse drug reactions, diagnosis change, or new procedure performed?: [x] No    [] Yes (see summary sheet for update)  Subjective functional status/changes:   [] No changes reported      OBJECTIVE    Modality rationale:    Min Type Additional Details    [] Estim:  []Unatt       []IFC  []Premod                        []Other:  []w/ice   []w/heat  Position:  Location:    [] Estim: []Att    []TENS instruct  []NMES                    []Other:  []w/US   []w/ice   []w/heat  Position:  Location:    []  Traction: [] Cervical       []Lumbar                       [] Prone          []Supine                       []Intermittent   []Continuous Lbs:  [] before manual  [] after manual    []  Ultrasound: []Continuous   [] Pulsed                           []1MHz   []3MHz W/cm2:  Location:    []  Iontophoresis with dexamethasone         Location: [] Take home patch   [] In clinic    []  Ice     []  heat  []  Ice massage  []  Laser   []  Anodyne Position:  Location:    []  Laser with stim  []  Other:  Position:  Location:    []  Vasopneumatic Device Pressure:       [] lo [] med [] hi   Temperature: [] lo [] med [] hi   [] Skin assessment post-treatment:  []intact []redness- no adverse reaction    []redness - adverse reaction:     19 min [x]Eval                  []Re-Eval       8 min Therapeutic Exercise:  [x] See flow sheet :HEP   Rationale: increase ROM and increase strength to improve the patients ability to prepare for return to work    8 min Therapeutic Activity:  []  See flow sheet :   Rationale: Patient education on diagnosis, healing times, squatting mechanics  to improve the patients ability to improve therapy outcomes     With   [] TE   [] TA   [] neuro   [] other: Patient Education: [x] Review HEP    [] Progressed/Changed HEP based on:   [] positioning   [] body mechanics   [] transfers   [] heat/ice application    [] other:      Other Objective/Functional Measures:      Pain Level (0-10 scale) post treatment: 7    ASSESSMENT/Changes in Function:     Patient will continue to benefit from skilled PT services to modify and progress therapeutic interventions, address functional mobility deficits, address ROM deficits, address strength deficits, analyze and address soft tissue restrictions, analyze and cue movement patterns, analyze and modify body mechanics/ergonomics, assess and modify postural abnormalities, address imbalance/dizziness and instruct in home and community integration to attain remaining goals.      [x]  See Plan of Care  []  See progress note/recertification  []  See Discharge Summary         Progress towards goals / Updated goals:  See POC    PLAN  []  Upgrade activities as tolerated     [x]  Continue plan of care  []  Update interventions per flow sheet       []  Discharge due to:_  []  Other:_      Elaine Contreras, PT 9/3/2019  8:21 AM    Future Appointments   Date Time Provider Coral Lala   9/3/2019  9:00 AM Harish Watkins PT [facility]   9/11/2019  3:00 PM CHRISSY Pozo [facility]   9/23/2019  9:25 AM Huber James MD [facility]   9/24/2019  4:00 PM PARISA Paulino 75

## 2019-09-06 ENCOUNTER — APPOINTMENT (OUTPATIENT)
Dept: PHYSICAL THERAPY | Age: 49
End: 2019-09-06
Payer: MEDICAID

## 2019-09-10 ENCOUNTER — OFFICE VISIT (OUTPATIENT)
Dept: FAMILY MEDICINE CLINIC | Age: 49
End: 2019-09-10

## 2019-09-10 VITALS
BODY MASS INDEX: 51.12 KG/M2 | RESPIRATION RATE: 16 BRPM | DIASTOLIC BLOOD PRESSURE: 70 MMHG | TEMPERATURE: 98.7 F | HEART RATE: 83 BPM | WEIGHT: 277.8 LBS | SYSTOLIC BLOOD PRESSURE: 118 MMHG | OXYGEN SATURATION: 98 % | HEIGHT: 62 IN

## 2019-09-10 DIAGNOSIS — M54.9 ACUTE UPPER BACK PAIN: Primary | ICD-10-CM

## 2019-09-10 RX ORDER — ACETAMINOPHEN 500 MG
TABLET ORAL
Qty: 30 CAP | Refills: 5 | Status: SHIPPED | OUTPATIENT
Start: 2019-09-10 | End: 2019-12-20 | Stop reason: SDUPTHER

## 2019-09-10 RX ORDER — CYCLOBENZAPRINE HCL 10 MG
10 TABLET ORAL
Qty: 10 TAB | Refills: 0 | Status: SHIPPED | OUTPATIENT
Start: 2019-09-10 | End: 2019-12-20

## 2019-09-10 NOTE — PROGRESS NOTES
Patient: Cory Sorenson MRN: [id number]  SSN: [id number]    YOB: 1970  Age: 51 y.o. Sex: female      Date of Service: 9/10/2019   Provider: CHRISSY Reed   Office Location:   [address], [address]  Office Phone: [phone]  Office Fax: [phone]        REASON FOR VISIT:   Chief Complaint   Patient presents with    Back Pain     upper back pain x 1 day        VITALS:   Visit Vitals  /70 (BP 1 Location: Left arm, BP Patient Position: Sitting)   Pulse 83   Temp 98.7 °F (37.1 °C) (Oral)   Resp 16   Ht 5' 2\" (1.575 m)   Wt 277 lb 12.8 oz (126 kg)   LMP 08/23/2019   SpO2 98%   BMI 50.81 kg/m²       MEDICATIONS:   Current Outpatient Medications   Medication Sig Dispense Refill    ibuprofen (MOTRIN) 800 mg tablet Take 1 Tab by mouth every eight (8) hours as needed for Pain. 90 Tab 1    lisinopril-hydroCHLOROthiazide (PRINZIDE, ZESTORETIC) 20-25 mg per tablet TAKE 1 TABLET BY MOUTH DAILY 90 Tab 1    metoprolol tartrate (LOPRESSOR) 100 mg IR tablet Take 1 Tab by mouth daily for 180 days. 90 Tab 1    D3-2000 2,000 unit cap capsule TAKE 1 CAPSULE BY MOUTH DAILY 30 Cap 5    LORazepam (ATIVAN) 1 mg tablet Take 1 tab PO prior to air travel for anxiety. May repeat in 4 hours if needed. MDD: 2 tabs 4 Tab 0    scopolamine (TRANSDERM-SCOP) 1 mg over 3 days pt3d 1 Patch by TransDERmal route every seventy-two (72) hours. 3 Patch 0    fluconazole (DIFLUCAN) 150 mg tablet Take 1 tab PO x 1 dose now. May repeat in 4 days if still symptomatic. 2 Tab 0        ALLERGIES:   No Known Allergies     ACTIVE MEDICAL PROBLEMS:  Patient Active Problem List   Diagnosis Code    Essential hypertension I10    Prediabetes R73.03    Morbid obesity with BMI of 45.0-49.9, adult ([facility] E66.01, Z68.42          HISTORY OF PRESENT ILLNESS:   Cory Sorenson is a 51 y.o. female who presents to the office for acute care.      Here today with complaints of acute pain in her upper back/posterior L shoulder since yesterday morning. Patient states she woke up with the pain after sleeping on the couch. Pain is localized and non-radiating. She states it feels \"like a pulled muscle,\" but she wanted to be certain because she does have some discomfort with deep breathing. She returned from a trip to the [facility] a few weeks ago but otherwise has not had any prolonged travel or immobility. No personal or family hx of VTE. Denies any fevers, chills, chest pain, SOB, wheezing, cough, nausea, vomiting, numbness, tingling. REVIEW OF SYSTEMS:  ROS as noted in HPI     PHYSICAL EXAMINATION:  Physical Exam   Constitutional: She is oriented to person, place, and time and well-developed, well-nourished, and in no distress. Cardiovascular: Normal rate, regular rhythm and normal heart sounds. Exam reveals no gallop and no friction rub. No murmur heard. Pulmonary/Chest: Effort normal and breath sounds normal. She has no wheezes. She has no rales. Musculoskeletal:        Left shoulder: She exhibits spasm. She exhibits normal range of motion and no tenderness. Cervical back: She exhibits spasm ( L upper trapezius). She exhibits normal range of motion, no bony tenderness, no edema and no deformity. Thoracic back: She exhibits normal range of motion, no bony tenderness, no edema and no deformity. Neurological: She is alert and oriented to person, place, and time. Gait normal.   Skin: Skin is warm and dry. No rash noted. Psychiatric: Mood, memory and affect normal.        RESULTS:  No results found for this visit on 09/10/19. ASSESSMENT/PLAN:  Diagnoses and all orders for this visit:    1.  Acute upper back pain  - Reassured pain seems to be musculoskeletal in etiology  - Continue supportive care with NSAIDs, ice/heat, stretching and gentle massage   - Flexeril sent for bedtime use PRN  - She will follow up if symptoms persist or worsen  Orders: -     cyclobenzaprine (FLEXERIL) 10 mg tablet; Take 1 Tab by mouth nightly as needed for Muscle Spasm(s). Other orders  - Vit D refilled at patient's request  Orders:    -     cholecalciferol (D3-2000) 2,000 unit cap capsule; TAKE 1 CAPSULE BY MOUTH DAILY      Follow-up and Dispositions    · Return in about 6 months (around 3/10/2020) for routine care, A1c at visit. All questions answered. Patient expresses understanding and agrees with the plan as detailed above.     PATIENT CARE TEAM:   Patient Care Team:  CHRISSY Tom as PCP - General (Physician Assistant)       CHRISSY Sexton   September 10, 2019   4:22 PM

## 2019-09-10 NOTE — PROGRESS NOTES
1. Have you been to the ER, urgent care clinic since your last visit? Hospitalized since your last visit? No    2. Have you seen or consulted any other health care providers outside of the [address] [place] [address] since your last visit? Include any pap smears or colon screening. No    Chief Complaint   Patient presents with    Back Pain     upper back pain x 1 day     Patient has an OB/GYN appointment on 10/06/19 with [facility] in [place].

## 2019-09-10 NOTE — PROGRESS NOTES
Patient: Ruby Terry MRN: [id number]  SSN: [id number]    YOB: 1970  Age: 51 y.o. Sex: female        Date of Service: 9/10/2019   Provider: CHRISSY Castro   Office Location:   Office Location:   [address], [address] [place] [address] [address], [address]  Office Phone: [phone]  Office Fax: [phone]      REASON FOR VISIT:   No chief complaint on file. VITALS:   There were no vitals taken for this visit. MEDICATIONS:   Current Outpatient Medications on File Prior to Visit   Medication Sig Dispense Refill    LORazepam (ATIVAN) 1 mg tablet Take 1 tab PO prior to air travel for anxiety. May repeat in 4 hours if needed. MDD: 2 tabs 4 Tab 0    scopolamine (TRANSDERM-SCOP) 1 mg over 3 days pt3d 1 Patch by TransDERmal route every seventy-two (72) hours. 3 Patch 0    ibuprofen (MOTRIN) 800 mg tablet Take 1 Tab by mouth every eight (8) hours as needed for Pain. 90 Tab 1    lisinopril-hydroCHLOROthiazide (PRINZIDE, ZESTORETIC) 20-25 mg per tablet TAKE 1 TABLET BY MOUTH DAILY 90 Tab 1    metoprolol tartrate (LOPRESSOR) 100 mg IR tablet Take 1 Tab by mouth daily for 180 days. 90 Tab 1    fluconazole (DIFLUCAN) 150 mg tablet Take 1 tab PO x 1 dose now. May repeat in 4 days if still symptomatic. 2 Tab 0    D3-2000 2,000 unit cap capsule TAKE 1 CAPSULE BY MOUTH DAILY 30 Cap 5     No current facility-administered medications on file prior to visit.          ALLERGIES:   No Known Allergies     PAST MEDICAL HISTORY:  Past Medical History:   Diagnosis Date    Acid reflux     Acute cystitis with hematuria     Heel spur     HTN (hypertension), benign 2009    Irregular menses     Menorrhagia     Plantar fasciitis     Shoulder bursitis     Urticaria     Vulvovaginitis         SURGICAL HISTORY:  Past Surgical History:   Procedure Laterality Date    HX CHOLECYSTECTOMY      HX GYN  2003        HX GYN  2005    BTL        FAMILY HISTORY:  Family History   Problem Relation Age of Onset   Person Cancer Mother     Diabetes Mother     Heart Failure Mother    Person Arthritis-rheumatoid Mother     Stroke Father     Hypertension Father     Substance Abuse Father         tobacco use    Alzheimer Father     Cancer Brother     Diabetes Brother     Drug Abuse Brother     Alcohol abuse Maternal Grandfather     Attention Deficit Disorder Daughter     Heart Attack Maternal Aunt         SOCIAL HISTORY:  Social History     Socioeconomic History    Marital status: SINGLE     Spouse name: Not on file    Number of children: Not on file    Years of education: Not on file    Highest education level: Not on file   Tobacco Use    Smoking status: Never Smoker    Smokeless tobacco: Never Used   Substance and Sexual Activity    Alcohol use: Yes     Comment: rare    Drug use: No    Sexual activity: Not Currently     Partners: Male     Birth control/protection: Surgical     Comment: tubal ligation        OBSTETRIC HISTORY:  OB History        4    Para   4    Term   4            AB        Living   4       SAB        TAB        Ectopic        Molar        Multiple        Live Births                       MENSTRUAL HISTORY:  Age at menarche: *** years  LMP: ***  Length of Menses: ***  Length of Cycle: ***  Menstrual Complaints: ***    SEXUAL HISTORY:  Sexual activity: ***  Contraceptive method: ***  History of STIs: ***    HEALTH SCREENING HISTORY:  Last pap: 10/28/16  Results: normal  History of abnormal pap?: {YES/NO:90180}    Last mammogram: ***  Last DEXA scan: ***  Last colorectal screening: ***      HPI:  Kerry Tracey is a 51 y.o. female who presents to the office for her annual well woman exam.       REVIEW OF SYSTEMS:   ROS     Breasts: Denies masses, skin changes, nipple discharge  Genitourinary: Denies pelvic pain, dyspareunia, abnormal vaginal bleeding or discharge, urinary frequency, urgency, dysuria, hematuria.      PHYSICAL EXAMINATION:   Physical Exam     Breasts: Symmetric bilaterally without skin or nipple changes, tenderness, or masses  Pelvic: External genitalia - no erythema, rashes, or lesions. Internal - vagina pink rugae without lesions or discharge. Cervix - pink, non-friable, os patent with no discharge. No cervical motion tenderness. Bimanual - Uterus and adnexae non-tender. No masses palpated. ASSESSMENT/PLAN:  {There are no diagnoses linked to this encounter.  (Refresh or delete this SmartLink)}           CHRISSY Castro  9/10/2019

## 2019-09-11 ENCOUNTER — APPOINTMENT (OUTPATIENT)
Dept: PHYSICAL THERAPY | Age: 49
End: 2019-09-11
Payer: MEDICAID

## 2019-09-18 ENCOUNTER — HOSPITAL ENCOUNTER (OUTPATIENT)
Dept: PHYSICAL THERAPY | Age: 49
Discharge: HOME | End: 2019-09-18
Payer: MEDICAID

## 2019-09-18 PROCEDURE — 97110 THERAPEUTIC EXERCISES: CPT

## 2019-09-18 PROCEDURE — 97112 NEUROMUSCULAR REEDUCATION: CPT

## 2019-09-18 NOTE — PROGRESS NOTES
PT DAILY TREATMENT NOTE 10-18    Patient Name: Nara Barahona  Date:2019  : 1970  [x]  Patient  Verified  Payor: Samia Johansen / Plan: [address] [place] [address] 60 / Product Type: Managed Care Medicaid /    In time:5;30  Out time:6;20  Total Treatment Time (min): 50  Visit #: 2 of 10    Medicare/BCBS Only   Total Timed Codes (min):   1:1 Treatment Time:         Treatment Area: Left hip pain [M25.552]  Low back pain [M54.5]    SUBJECTIVE  Pain Level (0-10 scale):  7  Any medication changes, allergies to medications, adverse drug reactions, diagnosis change, or new procedure performed?: [x] No    [] Yes (see summary sheet for update)  Subjective functional status/changes:   [] No changes reported  I really need to work on my right knee too so Ican tolerate standing/walking better for work    OBJECTIVE    Modality rationale: decrease pain and increase tissue extensibility to improve the patients ability to tolerate activity   Min Type Additional Details    [] Estim:  []Unatt       []IFC  []Premod                        []Other:  []w/ice   []w/heat  Position:  Location:    [] Estim: []Att    []TENS instruct  []NMES                    []Other:  []w/US   []w/ice   []w/heat  Position:  Location:    []  Traction: [] Cervical       []Lumbar                       [] Prone          []Supine                       []Intermittent   []Continuous Lbs:  [] before manual  [] after manual    []  Ultrasound: []Continuous   [] Pulsed                           []1MHz   []3MHz W/cm2:  Location:    []  Iontophoresis with dexamethasone         Location: [] Take home patch   [] In clinic   10 []  Ice     [x]  heat  []  Ice massage  []  Laser   []  Anodyne Position: supine  Location: LB    []  Laser with stim  []  Other:  Position:  Location:    []  Vasopneumatic Device Pressure:       [] lo [] med [] hi   Temperature: [] lo [] med [] hi   [x] Skin assessment post-treatment:  [x]intact []redness- no adverse reaction []redness - adverse reaction:       15 min Therapeutic Exercise:  [] See flow sheet :   Rationale: increase ROM and increase strength to improve the patients ability to improve standing/walking tolerance     25 min Neuromuscular Re-education:  []  See flow sheet :   Rationale: increase strength, improve coordination and improve balance  to improve the patients ability to increase core stability to reduce strain on LB, improve knee stability for gait          With   [] TE   [] TA   [] neuro   [] other: Patient Education: [x] Review HEP    [] Progressed/Changed HEP based on:   [] positioning   [] body mechanics   [] transfers   [] heat/ice application    [] other:      Other Objective/Functional Measures:  Initiated ex program     Pain Level (0-10 scale) post treatment: 5    ASSESSMENT/Changes in Function: first follow-up after eval.  Difficulty engaging core and maintaining during stability ex's. Poor eccentric quad control on Pilates chair     Patient will continue to benefit from skilled PT services to modify and progress therapeutic interventions, address functional mobility deficits, address ROM deficits, address strength deficits, analyze and address soft tissue restrictions, analyze and cue movement patterns, analyze and modify body mechanics/ergonomics, assess and modify postural abnormalities, address imbalance/dizziness and instruct in home and community integration to attain remaining goals. []  See Plan of Care  []  See progress note/recertification  []  See Discharge Summary         Progress towards goals / Updated goals:  Goal: Patient will be independent and compliant with HEP in order to progress toward long term goals. Status at last note/certification: issued and reviewed  Long Term Goals: To be accomplished in 10 treatments:  Goal: Patient will improve FOTO assessment score to 52 pts in order to progress toward long term goals.   Status at last note/certification: 41 pts  Goal: Patient will report no increased back pain with supine bridging in order to indicate improved glute activation and reduced lumbar strain with daily tasks. Status at last note/certification: painful  Goal: Patient will report worst back pain as 4/10 or less in order to improve tolerance to returning to work. Status at last note/certification: 7/31  Goal: Patient will report no increase in back pain with full lumbar AROM in order to be able to perform ADL without challenge.   Status at last note/certification: full but painful, mostly Left sided     PLAN  [x]  Upgrade activities as tolerated     []  Continue plan of care  []  Update interventions per flow sheet       []  Discharge due to:_  []  Other:_      Kay Mendoza PTA 9/18/2019  5:38 PM    Future Appointments   Date Time Provider Coral Reeves   9/18/2019  5:45 PM Garvin Fitz [facility]   9/23/2019  9:25 AM MD Michael Antoine   9/24/2019  4:00 PM PARISA Quezada 75

## 2019-09-23 ENCOUNTER — APPOINTMENT (OUTPATIENT)
Dept: PHYSICAL THERAPY | Age: 49
End: 2019-09-23
Payer: MEDICAID

## 2019-09-24 ENCOUNTER — OFFICE VISIT (OUTPATIENT)
Dept: ORTHOPEDIC SURGERY | Facility: CLINIC | Age: 49
End: 2019-09-24

## 2019-09-24 ENCOUNTER — OFFICE VISIT (OUTPATIENT)
Dept: ORTHOPEDIC SURGERY | Age: 49
End: 2019-09-24

## 2019-09-24 ENCOUNTER — APPOINTMENT (OUTPATIENT)
Dept: PHYSICAL THERAPY | Age: 49
End: 2019-09-24
Payer: MEDICAID

## 2019-09-24 VITALS
TEMPERATURE: 98.3 F | HEART RATE: 77 BPM | HEIGHT: 62 IN | WEIGHT: 277 LBS | BODY MASS INDEX: 50.97 KG/M2 | RESPIRATION RATE: 16 BRPM | DIASTOLIC BLOOD PRESSURE: 79 MMHG | OXYGEN SATURATION: 99 % | SYSTOLIC BLOOD PRESSURE: 121 MMHG

## 2019-09-24 VITALS
BODY MASS INDEX: 50.79 KG/M2 | OXYGEN SATURATION: 99 % | DIASTOLIC BLOOD PRESSURE: 83 MMHG | RESPIRATION RATE: 16 BRPM | HEART RATE: 70 BPM | SYSTOLIC BLOOD PRESSURE: 124 MMHG | WEIGHT: 276 LBS | HEIGHT: 62 IN | TEMPERATURE: 98 F

## 2019-09-24 DIAGNOSIS — S29.019S THORACIC MYOFASCIAL STRAIN, SEQUELA: ICD-10-CM

## 2019-09-24 DIAGNOSIS — M47.817 LUMBOSACRAL SPONDYLOSIS WITHOUT MYELOPATHY: ICD-10-CM

## 2019-09-24 DIAGNOSIS — M43.10 SPONDYLOLISTHESIS, ACQUIRED: ICD-10-CM

## 2019-09-24 DIAGNOSIS — M54.16 LUMBAR NEURITIS: ICD-10-CM

## 2019-09-24 DIAGNOSIS — S82.001A CLOSED NONDISPLACED FRACTURE OF RIGHT PATELLA, UNSPECIFIED FRACTURE MORPHOLOGY, INITIAL ENCOUNTER: Primary | ICD-10-CM

## 2019-09-24 DIAGNOSIS — M54.50 LOW BACK PAIN AT MULTIPLE SITES: Primary | ICD-10-CM

## 2019-09-24 DIAGNOSIS — M54.6 THORACIC SPINE PAIN: ICD-10-CM

## 2019-09-24 DIAGNOSIS — M51.36 DDD (DEGENERATIVE DISC DISEASE), LUMBAR: ICD-10-CM

## 2019-09-24 RX ORDER — TOPIRAMATE 25 MG/1
TABLET ORAL
Qty: 90 TAB | Refills: 1 | Status: SHIPPED | OUTPATIENT
Start: 2019-09-24 | End: 2020-05-21 | Stop reason: ALTCHOICE
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
## 2019-09-24 NOTE — LETTER
9/24/19 Patient: Butch Vanessa YOB: 1970 Date of Visit: 9/24/2019 [facility] [address] [address] [address] [place] [address] VIA In Basket Dear CHRISSY Dalton, Thank you for referring Ms. Ramy Dangelo to [address] [place] [address] for evaluation. My notes for this consultation are attached. If you have questions, please do not hesitate to call me. I look forward to following your patient along with you. Sincerely, Robert Lyman MD

## 2019-09-24 NOTE — PROGRESS NOTES
HISTORY OF PRESENT ILLNESS:  Nir Persaud returns to the office following for a nondisplaced right patella fracture. She is doing fair today. This week has been better for her as far as pain goes. She is full weightbearing of her right lower extremity. RADIOGRAPHS:  X-rays, today, reveal healing noted to the nondisplaced right patella fracture. Her range of motion is guarded. She does have severe varus deformity secondary to underlying, severe, end-stage, medial joint line osteoarthritis. PLAN:   At this point, we are going to allow her to return to work on September 3, 2019. I would like to see her back in about 2 to three weeks for an additional x-ray, and if needed, continued conservative treatment for her left knee osteoarthritic symptoms. Today, x-rays were reviewed, and all her questions were answered to her satisfaction.

## 2019-10-01 ENCOUNTER — APPOINTMENT (OUTPATIENT)
Dept: PHYSICAL THERAPY | Age: 49
End: 2019-10-01
Payer: MEDICAID

## 2019-10-01 ENCOUNTER — HOSPITAL ENCOUNTER (OUTPATIENT)
Dept: PHYSICAL THERAPY | Age: 49
Discharge: HOME | End: 2019-10-01
Payer: MEDICAID

## 2019-10-01 PROCEDURE — 97112 NEUROMUSCULAR REEDUCATION: CPT

## 2019-10-01 PROCEDURE — 97110 THERAPEUTIC EXERCISES: CPT

## 2019-10-01 NOTE — PROGRESS NOTES
[facility] - [facility], [address] [place] [address]  [phone] fax    Progress Note  Patient name: Cecelia Tubbs Start of Care: 9/3/2019   Referral source: Marquez Cisneros MD : 1970                Medical Diagnosis: Low back pain [M54.5]  Pain in left hip [M25.552]  Payor: [employer] / Plan: Lala Finger / Product Type: Managed Care Medicaid /  Onset Date:19                Treatment Diagnosis: low back pain, Left knee pain   Prior Hospitalization: see medical history Provider#: [id number]   Medications: Verified on Patient summary List    Comorbidities: Right patellar fracture 2019   Prior Level of Function: Functionally independent, lives with children in single level home, 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 Status at last note/certification: issued and reviewed  Current: met- pt reports compliance  Long Term Goals: To be accomplished in 10 treatments:  Goal: Patient will improve FOTO assessment score to 52 pts in order to progress toward long term goals. Status at last note/certification: 41 pts  Current: progressing- 48 pts  Goal: Patient will report no increased back pain with supine bridging in order to indicate improved glute activation and reduced lumbar strain with daily tasks. Status at last note/certification: painful  Current: not met- continues to report increased back pain with supine bridge  Goal: Patient will report worst back pain as 4/10 or less in order to improve tolerance to returning to work.   Status at last note/certification: 0/88  Current: not met- continues to report 10/10 pain  Goal: Patient will report no increase in back pain with full lumbar AROM in order to be able to perform ADL without challenge. Status at last note/certification: full but painful, mostly Left sided  Current: not met- continues to demonstrate full lumbar AROM in all planes but reports increased pain with forward flexion and bilateral rotation    Key Functional Changes:   Functional Gains: continues to feel good with heat, sitting is easier at home, lying down is no longer painful  Functional Deficits: bending over, still a fracture in right knee so I cannot do many leg exercises  % improvement: 50%  Pain   Average: 4/10                  Best: 4/10                Worst: 10/10  Patient Goal: \"be pain-free in my back when I go to work or doing activities at [employer]                    Updated Goals: to be achieved in 10 treatments:  Goal: Patient will improve FOTO assessment score to 52 pts in order to progress toward long term goals. Status at last note/certification: 48 pts  Goal: Patient will report no increased back pain with supine bridging in order to indicate improved glute activation and reduced lumbar strain with daily tasks. Status at last note/certification: continues to report increased back pain with supine bridge  Goal: Patient will report worst back pain as 4/10 or less in order to improve tolerance to returning to work. Status at last note/certification: 30/33 pain  Goal: Patient will report no increase in back pain with full lumbar AROM in order to be able to perform ADL without challenge.   Status at last note/certification: demonstrates full lumbar AROM in all planes but reports increased pain with forward flexion and bilateral rotation       ASSESSMENT/RECOMMENDATIONS:  [x]Continue therapy per initial plan/protocol at a frequency of  2 x per week for 10 sessions with increased emphasis on treating low back pain and avoiding interventions that place greater stress on right patella  []Continue therapy with the following recommended changes:_____________________ _____________________________________________________________________  []Discontinue therapy progressing towards or have reached established goals  []Discontinue therapy due to lack of appreciable progress towards goals  []Discontinue therapy due to lack of attendance or compliance  []Await Physician's recommendations/decisions regarding therapy  []Other:________________________________________________________________    Thank you for this referral.   Gwenlyn Leyden 10/1/2019 6:38 PM  NOTE TO PHYSICIAN:  [address]   FAX TO [facility]: [phone]  If you are unable to process this request in 24 hours please contact our office: [phone]  []  I have read the above report and request that my patient continue as recommended. []  I have read the above report and request that my patient continue therapy with the following changes/special instructions:________________________________________  []I have read the above report and request that my patient be discharged from therapy.     [de-identified] Signature:____________Date:_________TIME:________    [employer], Date and Time must be completed for valid certification **

## 2019-10-01 NOTE — PROGRESS NOTES
PT DAILY TREATMENT NOTE 10-18    Patient Name: Hu Landeros  Date:10/1/2019  : 1970  [x]  Patient  Verified  Payor: Mary Lard / Plan: [address] [place] [address] 60 / Product Type: Managed Care Medicaid /    In time:4:15  Out time:5:12  Total Treatment Time (min): 62  Visit #: 3 of 10    Medicare/BCBS Only   Total Timed Codes (min):   1:1 Treatment Time:         Treatment Area: Left hip pain [M25.552]  Low back pain [M54.5]    SUBJECTIVE  Pain Level (0-10 scale): 8  Any medication changes, allergies to medications, adverse drug reactions, diagnosis change, or new procedure performed?: [x] No    [] Yes (see summary sheet for update)  Subjective functional status/changes:   [] No changes reported  [facility] doctor doesn't want me doing any exercises for my knee because he says there is still a fracture there. \"    OBJECTIVE    Modality rationale: decrease pain to improve the patients ability to perform ADLs and rest comfortably following therapy.     Min Type Additional Details    [] Estim:  []Unatt       []IFC  []Premod                        []Other:  []w/ice   []w/heat  Position:   Location:     [] Estim: []Att    []TENS instruct  []NMES                    []Other:  []w/US   []w/ice   []w/heat  Position:   Location:     []  Traction: [] Cervical       []Lumbar                       [] Prone          []Supine                       []Intermittent   []Continuous Lbs:  [] before manual  [] after manual    []  Ultrasound: []Continuous   [] Pulsed                           []1MHz   []3MHz W/cm2:  Location:    []  Iontophoresis with dexamethasone         Location: [] Take home patch   [] In clinic   12 []  Ice     [x]  heat  []  Ice massage  []  Laser   []  Anodyne Position: Supine  Location: low back    []  Laser with stim  []  Other:  Position:  Location:    []  Vasopneumatic Device Pressure:       [] lo [] med [] hi   Temperature: [] lo [] med [] hi   [x] Skin assessment post-treatment:  [x]intact []redness- no adverse reaction    []redness - adverse reaction:     25 min Therapeutic Exercise:  [x] See flow sheet :   Rationale: increase ROM and increase strength to improve LE strength/mobility and  lumbar mobility to improve ease of ADLs and gait. 20 min Neuromuscular Re-education:  [x]  See flow sheet :   Rationale: increase strength, improve coordination, improve balance and increase proprioception  to improve the patients ability to perform functional tasks with improved abdominal brace utilization, improved control, and decreased risk for falls. With   [x] TE   [] TA   [] neuro   [] other: Patient Education: [x] Review HEP    [] Progressed/Changed HEP based on:   [] positioning   [] body mechanics   [] transfers   [] heat/ice application    [] other:      Other Objective/Functional Measures:   Functional Gains: continues to feel good with heat, sitting is easier at home, lying down is no longer painful  Functional Deficits: bending over, still a fracture in right knee so I cannot do many leg exercises  % improvement: 50%  Pain   Average: 4/10       Best: 4/10     Worst: 10/10  Patient Goal: \"be pain-free in my back when I go to work or doing activities at home\"       Pain Level (0-10 scale) post treatment: 4    ASSESSMENT/Changes in Function: Restricted number of standing/ knee extension interventions secondary to MD's request to avoid LE resistance exercise per radiograph evidence demonstrating continued right patellar fracture. Increased emphasis on lumbar mobility interventions.     Patient will continue to benefit from skilled PT services to modify and progress therapeutic interventions, address functional mobility deficits, address ROM deficits, address strength deficits, analyze and address soft tissue restrictions, analyze and cue movement patterns, analyze and modify body mechanics/ergonomics, assess and modify postural abnormalities and address imbalance/dizziness to attain remaining goals.     []  See Plan of Care  [x]  See progress note/recertification  []  See Discharge Summary         Progress towards goals / Updated goals:  Goal: Patient will be independent and compliant with HEP in order to progress toward long term goals. Status at last note/certification: issued and reviewed  [address] [employer] be accomplished in 10 treatments:  Goal: Patient will improve FOTO assessment score to 52 pts in order to progress toward long term goals. Status at last note/certification: 41 pts  Current: progressing- 48 pts  Goal: Patient will report no increased back pain with supine bridging in order to indicate improved glute activation and reduced lumbar strain with daily tasks. Status at last note/certification: painful  Current: not met- continues to report increased back pain with supine bridge  Goal: Patient will report worst back pain as 4/10 or less in order to improve tolerance to returning to work. Status at last note/certification: 6/97  Current: not met- continues to report 10/10 pain  Goal: Patient will report no increase in back pain with full lumbar AROM in order to be able to perform ADL without challenge.   Status at last note/certification: full but painful, mostly Left sided  Current: not met- continues to demonstrate full lumbar AROM in all planes but reports increased pain with forward flexion and bilateral rotation    PLAN  [x]  Upgrade activities as tolerated     [x]  Continue plan of care  []  Update interventions per flow sheet       []  Discharge due to:_  []  Other:_      Zac Culp 10/1/2019  4:34 PM    Future Appointments   Date Time Provider Coral Reeves   10/8/2019  4:00 PM [facility]   11/7/2019 10:40 AM Deven Cruz  [id number]

## 2019-10-04 ENCOUNTER — HOSPITAL ENCOUNTER (OUTPATIENT)
Dept: PHYSICAL THERAPY | Age: 49
Discharge: HOME | End: 2019-10-04
Payer: MEDICAID

## 2019-10-04 PROCEDURE — 97112 NEUROMUSCULAR REEDUCATION: CPT

## 2019-10-04 PROCEDURE — 97110 THERAPEUTIC EXERCISES: CPT

## 2019-10-04 NOTE — PROGRESS NOTES
PT DAILY TREATMENT NOTE 10-18    Patient Name: Sherita Oliva  Date:10/4/2019  : 1970  [x]  Patient  Verified  Payor: Dao Sanz / Plan: [place] [employer] [employer] / Product Type: Managed Care Medicaid /    In time:728  Out time:802  Total Treatment Time (min): 34  Visit #: 1 of 10    Medicare/BCBS Only   Total Timed Codes (min):   1:1 Treatment Time:         Treatment Area: Left hip pain [M25.552]  Low back pain [M54.5]  Thoracic back pain [M54.6]    SUBJECTIVE  Pain Level (0-10 scale): 6  Any medication changes, allergies to medications, adverse drug reactions, diagnosis change, or new procedure performed?: [x] No    [] Yes (see summary sheet for update)  Subjective functional status/changes:   [] No changes reported  The back isn't too bad. He doesn't want me doing anything with the knee, I go back to him Tuesday.      OBJECTIVE    Modality rationale: decrease pain and increase tissue extensibility to improve the patients ability to improve activity tolerance   Min Type Additional Details    [] Estim:  []Unatt       []IFC  []Premod                        []Other:  []w/ice   []w/heat  Position:  Location:    [] Estim: []Att    []TENS instruct  []NMES                    []Other:  []w/US   []w/ice   []w/heat  Position:  Location:    []  Traction: [] Cervical       []Lumbar                       [] Prone          []Supine                       []Intermittent   []Continuous Lbs:  [] before manual  [] after manual    []  Ultrasound: []Continuous   [] Pulsed                           []1MHz   []3MHz W/cm2:  Location:    []  Iontophoresis with dexamethasone         Location: [] Take home patch   [] In clinic   10 []  Ice     [x]  heat  []  Ice massage  []  Laser   []  Anodyne Position:reclined with wedge  Location:low back    []  Laser with stim  []  Other:  Position:  Location:    []  Vasopneumatic Device Pressure:       [] lo [] med [] hi   Temperature: [] lo [] med [] hi   [] Skin assessment post-treatment:  []intact []redness- no adverse reaction    []redness - adverse reaction:     10 min Therapeutic Exercise:  [x] See flow sheet :   Rationale: increase ROM and increase strength to improve the patients ability to perform ADL    14 min Neuromuscular Re-education:  [x]  See flow sheet :   Rationale: increase strength and improve coordination  to improve the patients ability to improve core activation in order to reduce lumbar strain with daily tasks     With   [] TE   [] TA   [] neuro   [] other: Patient Education: [x] Review HEP    [] Progressed/Changed HEP based on:   [] positioning   [] body mechanics   [] transfers   [] heat/ice application    [] other:      Other Objective/Functional Measures: completed exercises per flow sheet     Pain Level (0-10 scale) post treatment: 0    ASSESSMENT/Changes in Function: Continued to hold off on any knee strengthening exercises due to MD concerned with healing following fall earlier this year. Suggested IFC with heat to Patient but she declined for today, stating she will try next week. Patient will continue to benefit from skilled PT services to modify and progress therapeutic interventions, address functional mobility deficits, address ROM deficits, address strength deficits, analyze and address soft tissue restrictions, analyze and cue movement patterns, analyze and modify body mechanics/ergonomics, assess and modify postural abnormalities, address imbalance/dizziness and instruct in home and community integration to attain remaining goals. []  See Plan of Care  []  See progress note/recertification  []  See Discharge Summary         Progress towards goals / Updated goals:  Goal: Patient will improve FOTO assessment score to 52 pts in order to progress toward long term goals.   Status at last note/certification: 48 pts   Current status:   Goal: Patient will report no increased back pain with supine bridging in order to indicate improved glute activation and reduced lumbar strain with daily tasks. Status at last note/certification: continues to report increased back pain with supine bridge  Current status:   Goal: Patient will report worst back pain as 4/10 or less in order to improve tolerance to returning to work. Status at last note/certification: 31/46 pain  Current status:   Goal: Patient will report no increase in back pain with full lumbar AROM in order to be able to perform ADL without challenge.   Status at last note/certification: demonstrates full lumbar AROM in all planes but reports increased pain with forward flexion and bilateral rotation    Current status:     PLAN  [x]  Upgrade activities as tolerated     [x]  Continue plan of care  []  Update interventions per flow sheet       []  Discharge due to:_  []  Other:_      Stella Berrios PT 10/4/2019  7:28 AM    Future Appointments   Date Time Provider Coral Reeves   10/4/2019  7:30 AM Criss Meade, PT [facility]   10/7/2019  5:45 PM Marjie Alert [facility]   10/8/2019  4:00 PM Kathleen Contreras PA-C [facility]   10/10/2019  5:45 PM Sandrea Meter [facility]   10/15/2019  5:45 PM Sandrea Meter [facility]   10/17/2019  5:45 PM Sandrea Meter [facility]   10/22/2019  5:45 PM Arianne Jon [facility]   10/24/2019  5:45 PM Sandrea Meter [facility]   10/29/2019  5:45 PM Nisa Rodriguez [facility]   11/7/2019 10:40 AM Chantel Keen  [address]

## 2019-10-07 ENCOUNTER — HOSPITAL ENCOUNTER (OUTPATIENT)
Dept: PHYSICAL THERAPY | Age: 49
Discharge: HOME | End: 2019-10-07
Payer: MEDICAID

## 2019-10-07 PROCEDURE — 97112 NEUROMUSCULAR REEDUCATION: CPT

## 2019-10-07 PROCEDURE — 97110 THERAPEUTIC EXERCISES: CPT

## 2019-10-07 NOTE — PROGRESS NOTES
PT DAILY TREATMENT NOTE 10-18    Patient Name: Lloyd Deleon  Date:10/7/2019  : 1970  [x]  Patient  Verified  Payor: Koki Neighbor / Plan: [place] [employer] / Product Type: Managed Care Medicaid /    In time:555  Out time:629  Total Treatment Time (min): 34  Visit #: 2 of 10    Medicare/BCBS Only   Total Timed Codes (min):  24 1:1 Treatment Time:  24       Treatment Area: Left hip pain [M25.552]  Low back pain [M54.5]  Thoracic back pain [M54.6]       SUBJECTIVE  Pain Level (0-10 scale): 5  Any medication changes, allergies to medications, adverse drug reactions, diagnosis change, or new procedure performed?: [x] No    [] Yes (see summary sheet for update)  Subjective functional status/changes:   [] No changes reported  Pt reports that she sees her MD about her knee tomorrow, her back is doing ok today.  She wants to hold off on some of the more strenuous activities until she gets her knee checked.       OBJECTIVE            Modality rationale: decrease pain and increase tissue extensibility to improve the patients ability to improve activity tolerance   Min Type Additional Details      [] Estim:  []Unatt       []IFC  []Premod                        []Other:  []w/ice   []w/heat  Position:  Location:      [] Estim: []Att    []TENS instruct  []NMES                    []Other:  []w/US   []w/ice   []w/heat  Position:  Location:      []  Traction: [] Cervical       []Lumbar                       [] Prone          []Supine                       []Intermittent   []Continuous Lbs:  [] before manual  [] after manual      []  Ultrasound: []Continuous   [] Pulsed                           []1MHz   []3MHz W/cm2:  Location:      []  Iontophoresis with dexamethasone         Location: [] Take home patch   [] In clinic    10 []  Ice     [x]  heat  []  Ice massage  []  Laser   []  Anodyne Position:reclined with wedge  Location:low back      []  Laser with stim  []  Other:  Position:  Location:      [] Vasopneumatic Device Pressure:       [] lo [] med [] hi   Temperature: [] lo [] med [] hi    [] Skin assessment post-treatment:  []intact []redness- no adverse reaction    []redness - adverse reaction:      10 min Therapeutic Exercise:  [x] See flow sheet :   Rationale: increase ROM and increase strength to improve the patients ability to perform ADL     14 min Neuromuscular Re-education:  [x]  See flow sheet :   Rationale: increase strength and improve coordination  to improve the patients ability to improve core activation in order to reduce lumbar strain with daily tasks     With   [] TE   [] TA   [] neuro   [] other: Patient Education: [x] Review HEP    [] Progressed/Changed HEP based on:   [] positioning   [] body mechanics   [] transfers   [] heat/ice application    [] other:       Other Objective/Functional Measures: completed exercises per flow sheet         Pain Level (0-10 scale) post treatment: 1-2     ASSESSMENT/Changes in Function: Continued to hold on any strenuous or aggravating exercises pending Pt MD appt on 10/8/19. Pt tolerated modified program well reporting no inc in sx with tx. Pt required vc with trapeze exercise to maintain postural alignment/avoid lumbar flexion. Pt edu re purchasing electric or microwave able heat pack for home use. Pt verbalized understanding.      Patient will continue to benefit from skilled PT services to modify and progress therapeutic interventions, address functional mobility deficits, address ROM deficits, address strength deficits, analyze and address soft tissue restrictions, analyze and cue movement patterns, analyze and modify body mechanics/ergonomics, assess and modify postural abnormalities, address imbalance/dizziness and instruct in home and community integration to attain remaining goals.      []  See Plan of Care  []  See progress note/recertification  []  See Discharge Summary         Progress towards goals / Updated goals:  Goal: Patient will improve FOTO assessment score to 52 pts in order to progress toward long term goals. Status at last note/certification: 48 pts   Current status:   Goal: Patient will report no increased back pain with supine bridging in order to indicate improved glute activation and reduced lumbar strain with daily tasks. Status at last note/certification: continues to report increased back pain with supine bridge  Current status:   Goal: Patient will report worst back pain as 4/10 or less in order to improve tolerance to returning to work. Status at last note/certification: 91/66 pain  Current status:   Goal: Patient will report no increase in back pain with full lumbar AROM in order to be able to perform ADL without challenge.   Status at last note/certification: demonstrates full lumbar AROM in all planes but reports increased pain with forward flexion and bilateral rotation    Current status:      PLAN  [x]  Upgrade activities as tolerated     [x]  Continue plan of care  []  Update interventions per flow sheet       []  Discharge due to:_  []  Other:_        Nathaneil Husbands 10/7/2019  10:35 AM    Future Appointments   Date Time Provider Coral Reeves   10/7/2019  5:45 PM [facility]   10/8/2019  4:00 PM Elsy Castelan PA-C [facility]   10/10/2019  5:45 PM [facility]   10/15/2019  5:45 PM [facility]   10/17/2019  5:45 PM [facility]   10/22/2019  5:45 PM Melchor Rutledge [facility]   10/24/2019  5:45 PM [facility]   10/29/2019  5:45 PM [facility]   11/7/2019 10:40 AM Caleb Beaulieu  [address]

## 2019-10-08 ENCOUNTER — OFFICE VISIT (OUTPATIENT)
Dept: ORTHOPEDIC SURGERY | Facility: CLINIC | Age: 49
End: 2019-10-08

## 2019-10-08 VITALS
DIASTOLIC BLOOD PRESSURE: 71 MMHG | HEART RATE: 66 BPM | WEIGHT: 274 LBS | SYSTOLIC BLOOD PRESSURE: 112 MMHG | HEIGHT: 62 IN | OXYGEN SATURATION: 97 % | BODY MASS INDEX: 50.42 KG/M2

## 2019-10-08 DIAGNOSIS — M25.561 CHRONIC PAIN OF RIGHT KNEE: Primary | ICD-10-CM

## 2019-10-08 DIAGNOSIS — G89.29 CHRONIC PAIN OF RIGHT KNEE: Primary | ICD-10-CM

## 2019-10-08 RX ORDER — DICLOFENAC SODIUM 10 MG/G
GEL TOPICAL
Qty: 200 G | Refills: 5 | Status: SHIPPED | OUTPATIENT
Start: 2019-10-08 | End: 2019-12-20

## 2019-10-08 NOTE — PROGRESS NOTES
HISTORY OF PRESENT ILLNESS:  Bernardo Marte returns to the office following 3 months for a for a nondisplaced right patella fracture. She is doing fair today. This week has been better for her as far as pain goes. She is full weightbearing of her right lower extremity and has returned to work. However she complains of 8/10 pain to medial right knee with decreased ROM. Exam: 1+ effusion, decreased ROM with severe Varus deformity lacking 10 degrees to full extension. (-) Lachmans, (-) Giulia's, MCL / LCL intact. No evidence DVT nor calf pain. Distal NVI fully RLE    RADIOGRAPHS:  X-rays, today, complete healing noted to the nondisplaced right patella fracture. Her range of motion is guarded. She does have severe varus deformity secondary to underlying, severe, end-stage, medial joint line osteoarthritis. PLAN:   At this point, she wishes an aspiration / injection but would rather schedule on upcoming Friday Today, x-rays were reviewed, and all her questions were answered to her satisfaction.

## 2019-10-10 ENCOUNTER — HOSPITAL ENCOUNTER (OUTPATIENT)
Dept: PHYSICAL THERAPY | Age: 49
Discharge: HOME | End: 2019-10-10
Payer: MEDICAID

## 2019-10-10 PROCEDURE — 97110 THERAPEUTIC EXERCISES: CPT

## 2019-10-10 PROCEDURE — 97112 NEUROMUSCULAR REEDUCATION: CPT

## 2019-10-10 NOTE — PROGRESS NOTES
PT DAILY TREATMENT NOTE 10-18    Patient Name: Marcella Bowman  Date:10/10/2019  : 1970  [x]  Patient  Verified  Payor: Raya Rahman / [facility] / Product Type: Managed Care Medicaid /    In time:5:55  Out time:6:32  Total Treatment Time (min): 37  Visit #: 3 of 10    Medicare/BCBS Only   Total Timed Codes (min):   1:1 Treatment Time:         Treatment Area: Left hip pain [M25.552]  Low back pain [M54.5]  Thoracic back pain [M54.6]    SUBJECTIVE  Pain Level (0-10 scale): 5  Any medication changes, allergies to medications, adverse drug reactions, diagnosis change, or new procedure performed?: [x] No    [] Yes (see summary sheet for update)  Subjective functional status/changes:   [] No changes reported  \"My MD is going to \"    OBJECTIVE    Modality rationale: decrease pain to improve the patients ability to improve adherence with therapy interventions.    Min Type Additional Details    [] Estim:  []Unatt       []IFC  []Premod                        []Other:  []w/ice   []w/heat  Position:  Location:    [] Estim: []Att    []TENS instruct  []NMES                    []Other:  []w/US   []w/ice   []w/heat  Position:  Location:    []  Traction: [] Cervical       []Lumbar                       [] Prone          []Supine                       []Intermittent   []Continuous Lbs:  [] before manual  [] after manual    []  Ultrasound: []Continuous   [] Pulsed                           []1MHz   []3MHz Location:  W/cm2:    []  Iontophoresis with dexamethasone         Location: [] Take home patch   [] In clinic   10 []  Ice     [x]  heat  []  Ice massage  []  Laser   []  Anodyne Position: reclined  Location: low back    []  Laser with stim  []  Other: Position:  Location:    []  Vasopneumatic Device Pressure:       [] lo [] med [] hi   Temperature: [] lo [] med [] hi   [] Skin assessment post-treatment:  []intact []redness- no adverse reaction    []redness - adverse reaction:       15 min Therapeutic Exercise:  [x] See flow sheet :   Rationale: increase ROM and increase strength to improve LE strength/mobility and  lumbar mobility to improve ease of ADLs and gait. 12 min Neuromuscular Re-education:  [x]  See flow sheet :   Rationale: increase strength, improve coordination, improve balance and increase proprioception  to improve the patients ability to perform functional tasks with improved abdominal brace utilization, improved control, and decreased risk for falls. With   [] TE   [] TA   [] neuro   [] other: Patient Education: [x] Review HEP    [] Progressed/Changed HEP based on:   [] positioning   [] body mechanics   [] transfers   [] heat/ice application    [] other:      Other Objective/Functional Measures:      Pain Level (0-10 scale) post treatment: 2    ASSESSMENT/Changes in Function: Advised patient to inform us as soon as possible following her right knee scope if she will have to cancel her next session to avoid no show/cancellation policy. She tolerates bridges today with no back pain or knee pain. Patient will continue to benefit from skilled PT services to modify and progress therapeutic interventions, address functional mobility deficits, address ROM deficits, address strength deficits, analyze and address soft tissue restrictions, analyze and cue movement patterns, analyze and modify body mechanics/ergonomics, assess and modify postural abnormalities and address imbalance/dizziness to attain remaining goals. []  See Plan of Care  []  See progress note/recertification  []  See Discharge Summary         Progress towards goals / Updated goals:  Goal: Patient will improve FOTO assessment score to 52 pts in order to progress toward long term goals.   Status at last note/certification: 50 pts   Current status: reassess at next MD note  Goal: Patient will report no increased back pain with supine bridging in order to indicate improved glute activation and reduced lumbar strain with daily tasks. Status at last note/certification: continues to report increased back pain with supine bridge  Current status: met  Goal: Patient will report worst back pain as 4/10 or less in order to improve tolerance to returning to work. Status at last note/certification: 71/90 pain  Current status:   Goal: Patient will report no increase in back pain with full lumbar AROM in order to be able to perform ADL without challenge.   Status at last note/certification: demonstrates full lumbar AROM in all planes but reports increased pain with forward flexion and bilateral rotation    Current status:  increased pain with right side bend and extension today    PLAN  [x]  Upgrade activities as tolerated     [x]  Continue plan of care  []  Update interventions per flow sheet       []  Discharge due to:_  []  Other:_      Desire Miguelina 10/10/2019  6:06 PM    Future Appointments   Date Time Provider Coral Reeves   10/15/2019  5:45 PM [facility]   10/17/2019  5:45 PM Claudette Castañeda [facility]   10/18/2019  2:00 PM Stone Castelan PA-C [facility]   10/22/2019  5:45 PM Jaswinder Ambrocio [facility]   10/24/2019  5:45 PM [facility] [place] [facility]   10/29/2019  5:45 PM Claudette Castañeda [facility]   11/7/2019 10:40 AM Jaime Hassan  [address]

## 2019-10-14 ENCOUNTER — TELEPHONE (OUTPATIENT)
Dept: ORTHOPEDIC SURGERY | Facility: CLINIC | Age: 49
End: 2019-10-14

## 2019-10-14 NOTE — TELEPHONE ENCOUNTER
PA is required for Diclofenac gel    Cover [employer] [place]:  Dayana Gore ([place]: Christen Estradah) - [id number]

## 2019-10-15 ENCOUNTER — APPOINTMENT (OUTPATIENT)
Dept: PHYSICAL THERAPY | Age: 49
End: 2019-10-15
Payer: MEDICAID

## 2019-10-17 NOTE — PROGRESS NOTES
[facility] - [facility], [address] [place] [address]  [phone] fax    Discharge Summary    Patient name: Judy Collins Start of Care: 9/3/2019   Referral source: Selma Westbrook MD : 1970                Medical Diagnosis: Low back pain [M54.5]  Pain in left hip [M25.552]  Payor: [employer] / Plan: [address] [place] [address] [address] / Product Type: Managed Care Medicaid /  Onset Date:19                Treatment Diagnosis: low back pain, Left knee pain   Prior Hospitalization: see medical history Provider#: [id number]   Medications: Verified on Patient summary List    Comorbidities: Right patellar fracture 2019   Prior Level of Function: Functionally independent, lives with children in single level home, works in [address]      Visits from [place] of Care: 6    Missed Visits: 4    Reporting Period : 10/2/19 to 10/10/19    Goal: Patient will improve FOTO assessment score to 52 pts in order to progress toward long term goals. Status at last note/certification: 48 pts   Current status: unable to be reassessed  Goal: Patient will report no increased back pain with supine bridging in order to indicate improved glute activation and reduced lumbar strain with daily tasks. Status at last note/certification: continues to report increased back pain with supine bridge  Current status: met  Goal: Patient will report worst back pain as 4/10 or less in order to improve tolerance to returning to work. Status at last note/certification: 69/68 pain  Current status: not met- 10/10 pain at worst  Goal: Patient will report no increase in back pain with full lumbar AROM in order to be able to perform ADL without challenge.   Status at last note/certification: demonstrates full lumbar AROM in all planes but reports increased pain with forward flexion and bilateral rotation    Current status:  progressing- increased pain with right side bend and extension today    Assessment/ Summary of Care: Pt attended therapy inconsistently for 6 visits for the treatment of low back and left knee pain. The patient will be discharged at this time with no further instructions secondary to noncompliance with therapy per the no show/cancellation policy. RECOMMENDATIONS:  [x]Discontinue therapy: []Patient has reached or is progressing toward set goals      [x]Patient is non-compliant or has abdicated      []Due to lack of appreciable progress towards set goals    Valeri Blind 10/17/2019 7:01 PM    NOTE TO PHYSICIAN:  Please complete the following and fax to: [facility] [place] at [phone]  . Retain this original for your records. If you are unable to process this request in   24 hours, please contact our office.      [] I have read the above report and request that my patient continue therapy with the following changes/special instructions:  [] I have read the above report and request that my patient be discharged from therapy    Physician's Signature:____________Date:_________TIME:________    ** Signature, Date and Time must be completed for valid certification **

## 2019-10-22 ENCOUNTER — APPOINTMENT (OUTPATIENT)
Dept: PHYSICAL THERAPY | Age: 49
End: 2019-10-22
Payer: MEDICAID

## 2019-10-23 ENCOUNTER — OFFICE VISIT (OUTPATIENT)
Dept: ORTHOPEDIC SURGERY | Age: 49
End: 2019-10-23

## 2019-10-23 VITALS
SYSTOLIC BLOOD PRESSURE: 127 MMHG | RESPIRATION RATE: 16 BRPM | DIASTOLIC BLOOD PRESSURE: 70 MMHG | HEIGHT: 62 IN | WEIGHT: 280.2 LBS | HEART RATE: 81 BPM | OXYGEN SATURATION: 98 % | BODY MASS INDEX: 51.56 KG/M2 | TEMPERATURE: 98.4 F

## 2019-10-23 DIAGNOSIS — M79.671 RIGHT FOOT PAIN: ICD-10-CM

## 2019-10-23 DIAGNOSIS — M19.079 ARTHRITIS OF MIDFOOT: Primary | ICD-10-CM

## 2019-10-23 RX ORDER — DICLOFENAC SODIUM 10 MG/G
4 GEL TOPICAL 2 TIMES DAILY
Qty: 100 G | Refills: 1 | Status: SHIPPED | OUTPATIENT
Start: 2019-10-23 | End: 2021-11-30 | Stop reason: ALTCHOICE

## 2019-10-23 NOTE — PATIENT INSTRUCTIONS
Look into [employer] or [employer] rockerbottom shoes. You have been provided with an order for durable medical equipment that you may  at an outside facility as our office does not carry the equipment you need. You may pick it up at any medical supply company you like. Listed below are a few different locations for your convenience:    [address] [place] [address]  [address], [address]  Phone: [phone]       Arthritis: Care Instructions  Your Care Instructions  Arthritis, also called osteoarthritis, is a breakdown of the cartilage that cushions your joints. When the cartilage wears down, your bones rub against each other. This causes pain and stiffness. Many people have some arthritis as they age. Arthritis most often affects the joints of the spine, hands, hips, knees, or feet. You can take simple measures to protect your joints, ease your pain, and help you stay active. Follow-up care is a key part of your treatment and safety. Be sure to make and go to all appointments, and call your doctor if you are having problems. It's also a good idea to know your test results and keep a list of the medicines you take. How can you care for yourself at home? · Stay at a healthy weight. Being overweight puts extra strain on your joints. · Talk to your doctor or physical therapist about exercises that will help ease joint pain. ? Stretch. You may enjoy gentle forms of yoga to help keep your joints and muscles flexible. ? Walk instead of jog. Other types of exercise that are less stressful on the joints include riding a bicycle, swimming, yoshi chi, or water exercise. ? Lift weights. Strong muscles help reduce stress on your joints. Stronger thigh muscles, for example, take some of the stress off of the knees and hips. Learn the right way to lift weights so you do not make joint pain worse. · Take your medicines exactly as prescribed.  Call your doctor if you think you are having a problem with your medicine. · Take pain medicines exactly as directed. ? If the doctor gave you a prescription medicine for pain, take it as prescribed. ? If you are not taking a prescription pain medicine, ask your doctor if you can take an over-the-counter medicine. · Use a cane, crutch, walker, or another device if you need help to get around. These can help rest your joints. You also can use other things to make life easier, such as a higher toilet seat and padded handles on kitchen utensils. · Do not sit in low chairs, which can make it hard to get up. · Put heat or cold on your sore joints as needed. Use whichever helps you most. You also can take turns with hot and cold packs. ? Apply heat 2 or 3 times a day for 20 to 30 minutes--using a heating pad, hot shower, or hot pack--to relieve pain and stiffness. ? Put ice or a cold pack on your sore joint for 10 to 20 minutes at a time. Put a thin cloth between the ice and your skin. When should you call for help? Call your doctor now or seek immediate medical care if:    · You have sudden swelling, warmth, or pain in any joint.     · You have joint pain and a fever or rash.     · You have such bad pain that you cannot use a joint.    Watch closely for changes in your health, and be sure to contact your doctor if:    · You have mild joint symptoms that continue even with more than 6 weeks of care at home.     · You have stomach pain or other problems with your medicine. Where can you learn more? Go to http://ivan-joshua.info/. Enter W271 in the search box to learn more about \"Arthritis: Care Instructions. \"  Current as of: April 1, 2019  Content Version: 12.2  © 7850-1440 [employer]. Care instructions adapted under license by [employer] (which disclaims liability or warranty for this information).  If you have questions about a medical condition or this instruction, always ask your healthcare professional. [employer] disclaims any warranty or liability for your use of this information.

## 2019-10-23 NOTE — PROGRESS NOTES
AMBULATORY PROGRESS NOTE      Patient: Mary Silva             MRN: [id number]     SSN: [id number] Body mass index is 51.25 kg/m². YOB: 1970     AGE: 51 y.o. SEX: female    PCP: CHRISSY Real     IMPRESSION/DIAGNOSIS AND TREATMENT PLAN     DIAGNOSES  1. Arthritis of midfoot    2. Right foot pain        Orders Placed This Encounter    AMB SUPPLY ORDER    [96035] Foot Min 3V    diclofenac (VOLTAREN) 1 % gel      Mary Silva understands her diagnoses and the proposed plan. Plan:    1) DME Order: [employer] medial arch supports (LMS). 2) Voltaren 1% gel: 4 g BID; 100 g, 1 refill. 3) Look into [employer] or [employer] rockerbottom shoes. 4) Continue activity modification as directed. 5) Anticipate custom orthotic insert or NSAIDs PO if condition does not improve. RTO - 4 weeks     HPI AND EXAMINATION     Mary Silva IS A 51 y.o. female who presents to my outpatient office complaining of right foot pain. Ms. Albertina Sen reports that she has been experiencing pain in her right dorsal foot. She notes that she has h/o right knee fracture in July, which is being treated by Dr. Tere Silverio, and right knee arthritis. She reports that she experiences pain in her dorsal foot first thing in the morning when she begins walking. She notes that she experiences pain when she pushes off to walk. The patient denies any GI issues or bariatric procedures. She is not currently taking blood thinners. Visit Vitals  /70   Pulse 81   Temp 98.4 °F (36.9 °C) (Oral)   Resp 16   Ht 5' 2\" (1.575 m)   Wt 280 lb 3.2 oz (127.1 kg)   SpO2 98%   BMI 51.25 kg/m²       Appearance: Alert, well appearing and pleasant patient who is in no distress, oriented to person, place/time, and who follows commands. This patient is accompanied in the examination room by her self. Dementia: no dementia  Psychiatric: Affect and mood are appropriate.  Patient arrives to office via: without assistive device  HEENT: Head normocephalic & atraumatic. Both pupils are round, non icteric sclera   Eye: EOM are intact and sclera are clear    Neck: ROM WNL and JVD neck is not present     Hearings Intact, does not require hearing aid device  Respiratory: Breathing is unlabored without accessory chest muscle use  Cardiovascular/Peripheral Vascular: Normal Pulses to each foot    ANKLE/FOOT right    Gait: Normal  Tenderness: Tenderness with stressing of the midfoot. Cutaneous:  Midfoot swelling present  Joint Motion: Full ROM  Joint / Tendon Stability: No Ankle or Subtalar instability or joint laxity. No peroneal sublux ability or dislocation  Alignment: Forefoot, Midfoot, Hindfoot WNL. Neuro Motor/Sensory: NL/NL. Vascular: NL foot/ankle pulses. Lymphatics: No extremity lymphedema, No calf swelling, no tenderness to calf muscles. CHART REVIEW     Past Medical History:   Diagnosis Date    Acid reflux     Acute cystitis with hematuria     Heel spur     HTN (hypertension), benign 12/2/2009    Irregular menses     Menorrhagia     Plantar fasciitis     Shoulder bursitis     Urticaria     Vulvovaginitis      Current Outpatient Medications   Medication Sig    diclofenac (VOLTAREN) 1 % gel Apply 4 g to affected area two (2) times a day.  diclofenac (VOLTAREN) 1 % gel Apply 4 grams topically to right anterior knee 4 x daily    topiramate (TOPAMAX) 25 mg tablet 3 tabs PO QHS    cholecalciferol (D3-2000) 2,000 unit cap capsule TAKE 1 CAPSULE BY MOUTH DAILY    cyclobenzaprine (FLEXERIL) 10 mg tablet Take 1 Tab by mouth nightly as needed for Muscle Spasm(s).  ibuprofen (MOTRIN) 800 mg tablet Take 1 Tab by mouth every eight (8) hours as needed for Pain.  lisinopril-hydroCHLOROthiazide (PRINZIDE, ZESTORETIC) 20-25 mg per tablet TAKE 1 TABLET BY MOUTH DAILY    metoprolol tartrate (LOPRESSOR) 100 mg IR tablet Take 1 Tab by mouth daily for 180 days.     fluconazole (DIFLUCAN) 150 mg tablet Take 1 tab PO x 1 dose now. May repeat in 4 days if still symptomatic. No current facility-administered medications for this visit. No Known Allergies  Past Surgical History:   Procedure Laterality Date    HX CHOLECYSTECTOMY      HX GYN  2003        HX GYN  2005    BTL     Social History     Occupational History    Not on file   Tobacco Use    Smoking status: Never Smoker    Smokeless tobacco: Never Used   Substance and Sexual Activity    Alcohol use: Yes     Comment: rare    Drug use: No    Sexual activity: Not Currently     Partners: Male     Birth control/protection: Surgical     Comment: tubal ligation     Family History   Problem Relation Age of Onset    Cancer Mother     Diabetes Mother     Heart Failure Mother    Justice Messina Arthritis-rheumatoid Mother     Stroke Father     Hypertension Father     Substance Abuse Father         tobacco use    Alzheimer Father     Cancer Brother     Diabetes Brother     Drug Abuse Brother     Alcohol abuse Maternal Grandfather     Attention Deficit Disorder Daughter     Heart Attack Maternal Aunt         REVIEW OF SYSTEMS : 10/23/2019  ALL BELOW ARE Negative except : SEE HPI      REVIEW OF SYSTEMS : Total of 12 systems reviewed as follows:            CONSTITUTIONAL: No weight loss. PSYCHOLOGICAL : No Feelings of anxiety, depression, agitation  EYES: No blurred vision and no eye discharge. NO eye pain, double vision  ENT: No nasal discharge. No ear pain. CARDIOVASCULAR: No chest pain and no diaphoresis. RESPIRATORY: No cough, no hemoptysis. GI: No vomiting, no diarrhea   : No urinary frequency and no dysuria. MUSCULOSKELETAL: see HPI  SKIN: No rashes. NEURO: Negative for : dizziness,weakness, headaches     Negative for : visual changes or confusion, or seizures,   ENDOCRINE: No polyphagia and no polydipsia. HEMATOLOGY: No bleeding tendencies.         DIAGNOSTIC IMAGING     FOOT X RAYS 3 VIEWS Right   10/23/2019    NON WEIGHT BEARING    X RAYS AT [address] [place] [address]  10/23/2019    {Right FOOT:     Bones: No fractures or dislocations. No focal osteolytic or osteoblastic process  Bone Spurs No significant bone spurs   Alignment: Deformity Location: None present, TMTs Midfoot, NC/Intercuneiform regions, Varus Hindfoot, Valgus Hindfoot  Joint Condition: Moderate OA changes present   JOINT SPACE NARROWING AND OA AT 2 TMT JOINT SPACES   Soft Tissues Mild swelling dorsal midfoot   No ankle joint effusion in lateral projection. Mineralization: suggests Normal Bone    I have personally reviewed the results of the above study. The interpretation of this study is my professional opinion      Written by Brittany Marcus, as dictated by Dr. Jann Johansen. I, Dr. Jann Johansen, confirm that all documentation is accurate.

## 2019-10-23 NOTE — PROGRESS NOTES
1. Have you been to the ER, urgent care clinic since your last visit? Hospitalized since your last visit? No    2. Have you seen or consulted any other health care providers outside of the [address] [place] [address] since your last visit? Include any pap smears or colon screening.  No

## 2019-10-24 ENCOUNTER — APPOINTMENT (OUTPATIENT)
Dept: PHYSICAL THERAPY | Age: 49
End: 2019-10-24
Payer: MEDICAID

## 2019-10-29 ENCOUNTER — APPOINTMENT (OUTPATIENT)
Dept: PHYSICAL THERAPY | Age: 49
End: 2019-10-29
Payer: MEDICAID

## 2019-12-03 ENCOUNTER — TELEPHONE (OUTPATIENT)
Dept: ORTHOPEDIC SURGERY | Facility: CLINIC | Age: 49
End: 2019-12-03

## 2019-12-03 NOTE — TELEPHONE ENCOUNTER
Attempt to reach pt x3. Phone going directly to [employer]. Unable to leave message due to mailbox being full. If pt calls back please inform of CHRISSY Castelan response below.  Morelia Kiser LPN

## 2019-12-03 NOTE — TELEPHONE ENCOUNTER
Patient called and stated that the [facility] informed her that they no longer carry the diclofenac (VOLTAREN) 1 % gel .  Please advise     Patient tel : [phone]

## 2019-12-20 ENCOUNTER — OFFICE VISIT (OUTPATIENT)
Dept: FAMILY MEDICINE CLINIC | Age: 49
End: 2019-12-20

## 2019-12-20 VITALS
DIASTOLIC BLOOD PRESSURE: 82 MMHG | HEART RATE: 82 BPM | WEIGHT: 277 LBS | BODY MASS INDEX: 50.97 KG/M2 | SYSTOLIC BLOOD PRESSURE: 112 MMHG | OXYGEN SATURATION: 98 % | TEMPERATURE: 98.4 F | RESPIRATION RATE: 16 BRPM | HEIGHT: 62 IN

## 2019-12-20 DIAGNOSIS — I10 ESSENTIAL HYPERTENSION: ICD-10-CM

## 2019-12-20 DIAGNOSIS — R30.0 BURNING WITH URINATION: Primary | ICD-10-CM

## 2019-12-20 DIAGNOSIS — R30.0 BURNING WITH URINATION: ICD-10-CM

## 2019-12-20 DIAGNOSIS — R73.03 PREDIABETES: ICD-10-CM

## 2019-12-20 LAB
BILIRUB UR QL STRIP: NEGATIVE
GLUCOSE UR-MCNC: NEGATIVE MG/DL
HBA1C MFR BLD HPLC: 6 %
KETONES P FAST UR STRIP-MCNC: NEGATIVE MG/DL
PH UR STRIP: 5.5 [PH] (ref 4.6–8)
PROT UR QL STRIP: NEGATIVE
SP GR UR STRIP: 1.02 (ref 1–1.03)
UA UROBILINOGEN AMB POC: NORMAL (ref 0.2–1)
URINALYSIS CLARITY POC: NORMAL
URINALYSIS COLOR POC: YELLOW
URINE BLOOD POC: NEGATIVE
URINE LEUKOCYTES POC: NEGATIVE
URINE NITRITES POC: NEGATIVE

## 2019-12-20 RX ORDER — METOPROLOL TARTRATE 100 MG/1
100 TABLET ORAL DAILY
Qty: 90 TAB | Refills: 1 | Status: SHIPPED | OUTPATIENT
Start: 2019-12-20 | End: 2020-03-02 | Stop reason: SDUPTHER

## 2019-12-20 RX ORDER — LISINOPRIL AND HYDROCHLOROTHIAZIDE 20; 25 MG/1; MG/1
TABLET ORAL
Qty: 90 TAB | Refills: 1 | Status: SHIPPED | OUTPATIENT
Start: 2019-12-20 | End: 2020-03-02 | Stop reason: SDUPTHER

## 2019-12-20 RX ORDER — ACETAMINOPHEN 500 MG
TABLET ORAL
Qty: 30 CAP | Refills: 5 | Status: SHIPPED | OUTPATIENT
Start: 2019-12-20 | End: 2020-03-02 | Stop reason: SDUPTHER

## 2019-12-20 NOTE — PATIENT INSTRUCTIONS

## 2019-12-20 NOTE — PROGRESS NOTES
SUBJECTIVE  Chief Complaint   Patient presents with    Hypertension    Urinary Burning    Urinary Frequency    Medication Refill     Patient presents complaining of a 4 week history of dysuria. She says that it hurts only when she holds her urine too long. She denies any urinary urgency or frequency. The patient has not had any suprapubic discomfort or pressure. They deny any hematuria or nocturia. The patient denies any fevers. The patient denies any back or flank pain. The patient has tried no remedies. Also needs HTN med refills and wants an A1c check. Supposed to see her regular PCP in March. OBJECTIVE    Blood pressure 112/82, pulse 82, temperature 98.4 °F (36.9 °C), temperature source Oral, resp. rate 16, height 5' 2\" (1.575 m), weight 277 lb (125.6 kg), last menstrual period 08/20/2019, SpO2 98 %. General:  Alert, cooperative, well appearing, in no apparent distress. Psych: normal affect. Mood good. Oriented x 3. Judgement and insight intact. Results for orders placed or performed in visit on 12/20/19   AMB POC URINALYSIS DIP STICK AUTO W/O MICRO   Result Value Ref Range    Color (UA POC) Yellow     Clarity (UA POC) Slightly Cloudy     Glucose (UA POC) Negative Negative    Bilirubin (UA POC) Negative Negative    Ketones (UA POC) Negative Negative    Specific gravity (UA POC) 1.025 1.001 - 1.035    Blood (UA POC) Negative Negative    pH (UA POC) 5.5 4.6 - 8.0    Protein (UA POC) Negative Negative    Urobilinogen (UA POC) 0.2 mg/dL 0.2 - 1    Nitrites (UA POC) Negative Negative    Leukocyte esterase (UA POC) Negative Negative   AMB POC HEMOGLOBIN A1C   Result Value Ref Range    Hemoglobin A1c (POC) 6.0 %     ASSESSMENT / PLAN    ICD-10-CM ICD-9-CM    1. Burning with urination R30.0 788.1 AMB POC URINALYSIS DIP STICK AUTO W/O MICRO      CULTURE, URINE      CANCELED: CULTURE, URINE   2.  Essential hypertension I10 401.9 lisinopril-hydroCHLOROthiazide (PRINZIDE, ZESTORETIC) 20-25 mg per tablet      metoprolol tartrate (LOPRESSOR) 100 mg IR tablet   3. Prediabetes R73.03 790.29 AMB POC HEMOGLOBIN A1C     Urine dip revealed no abnormalities. We will send the urine for culture. Advised to avoid holding urine for extended periods of time. HTN - refills of medications. Controlled. Prediabetes - work on diet and exercise. A1c reviewed. All chart history elements were reviewed by me at the time of the visit even though marked at time of note closure. Patient understands our medical plan. Patient has provided input and agrees with goals. Alternatives have been explained and offered. All questions answered. The patient is to call if condition worsens or fails to improve. Follow-up and Dispositions    · Return in about 3 months (around 3/20/2020) for routine care with JUNITO LUKE [facility].

## 2019-12-20 NOTE — PROGRESS NOTES
1. Have you been to the ER, urgent care clinic since your last visit? Hospitalized since your last visit? No    2. Have you seen or consulted any other health care providers outside of the [address] [place] [address] since your last visit? Include any pap smears or colon screening.  No

## 2019-12-24 LAB — RESULT: NORMAL

## 2020-03-02 ENCOUNTER — OFFICE VISIT (OUTPATIENT)
Dept: FAMILY MEDICINE CLINIC | Age: 50
End: 2020-03-02

## 2020-03-02 VITALS
DIASTOLIC BLOOD PRESSURE: 72 MMHG | HEART RATE: 100 BPM | BODY MASS INDEX: 51.53 KG/M2 | RESPIRATION RATE: 18 BRPM | SYSTOLIC BLOOD PRESSURE: 126 MMHG | WEIGHT: 280 LBS | OXYGEN SATURATION: 98 % | HEIGHT: 62 IN | TEMPERATURE: 98.7 F

## 2020-03-02 DIAGNOSIS — I10 ESSENTIAL HYPERTENSION: Primary | ICD-10-CM

## 2020-03-02 DIAGNOSIS — I10 ESSENTIAL HYPERTENSION: ICD-10-CM

## 2020-03-02 DIAGNOSIS — R73.03 PREDIABETES: ICD-10-CM

## 2020-03-02 DIAGNOSIS — Z12.11 SCREENING FOR COLON CANCER: ICD-10-CM

## 2020-03-02 RX ORDER — METOPROLOL TARTRATE 100 MG/1
100 TABLET ORAL DAILY
Qty: 90 TAB | Refills: 1 | Status: SHIPPED | OUTPATIENT
Start: 2020-03-02 | End: 2021-02-19 | Stop reason: SDUPTHER

## 2020-03-02 RX ORDER — ACETAMINOPHEN 500 MG
TABLET ORAL
Qty: 30 CAP | Refills: 5 | Status: SHIPPED | OUTPATIENT
Start: 2020-03-02 | End: 2021-02-17 | Stop reason: SDUPTHER

## 2020-03-02 RX ORDER — LISINOPRIL AND HYDROCHLOROTHIAZIDE 20; 25 MG/1; MG/1
TABLET ORAL
Qty: 90 TAB | Refills: 1 | Status: SHIPPED | OUTPATIENT
Start: 2020-03-02 | End: 2021-02-17 | Stop reason: SDUPTHER

## 2020-03-02 NOTE — PROGRESS NOTES
Patient: Nihdi Jay MRN: [id number]  SSN: [id number]    YOB: 1970  Age: 47 y.o. Sex: female      Date of Service: 3/2/2020   Provider: CHRISSY Samaniego   Office Location:   [address] Trae carmen, [address]  Office Phone: [phone]  Office Fax: [phone]      REASON FOR VISIT:   Chief Complaint   Patient presents with    Hypertension    Other     Pre-DM        VITALS:   Visit Vitals  /72 (BP 1 Location: Left arm, BP Patient Position: Sitting)   Pulse 100   Temp 98.7 °F (37.1 °C) (Oral)   Resp 18   Ht 5' 2\" (1.575 m)   Wt 280 lb (127 kg)   LMP 2020   SpO2 98%   BMI 51.21 kg/m²        MEDICATIONS:   Current Outpatient Medications on File Prior to Visit   Medication Sig Dispense Refill    lisinopril-hydroCHLOROthiazide (PRINZIDE, ZESTORETIC) 20-25 mg per tablet TAKE 1 TABLET BY MOUTH DAILY 90 Tab 1    metoprolol tartrate (LOPRESSOR) 100 mg IR tablet Take 1 Tab by mouth daily for 180 days. 90 Tab 1    cholecalciferol (D3-2000) (2,000 UNITS /50 MCG) cap capsule TAKE 1 CAPSULE BY MOUTH DAILY 30 Cap 5    diclofenac (VOLTAREN) 1 % gel Apply 4 g to affected area two (2) times a day. 100 g 1    topiramate (TOPAMAX) 25 mg tablet 3 tabs PO QHS 90 Tab 1    ibuprofen (MOTRIN) 800 mg tablet Take 1 Tab by mouth every eight (8) hours as needed for Pain. 90 Tab 1     No current facility-administered medications on file prior to visit.          ALLERGIES:   No Known Allergies     MEDICAL/SURGICAL HISTORY:  Past Medical History:   Diagnosis Date    Acid reflux     Acute cystitis with hematuria     Heel spur     HTN (hypertension), benign 2009    Menorrhagia     Plantar fasciitis     Shoulder bursitis     Urticaria     Vulvovaginitis       Past Surgical History:   Procedure Laterality Date    HX CHOLECYSTECTOMY      HX GYN  2003        HX GYN  2005    BTL        FAMILY HISTORY:  Family History Problem Relation Age of Onset    Cancer Mother     Diabetes Mother     Heart Failure Mother    Rodolfo Persaud Arthritis-rheumatoid Mother     Stroke Father     Hypertension Father     Substance Abuse Father         tobacco use    Alzheimer Father     Cancer Brother     Diabetes Brother     Drug Abuse Brother     Alcohol abuse Maternal Grandfather     Attention Deficit Disorder Daughter     Heart Attack Maternal Aunt         SOCIAL HISTORY:  Social History     Tobacco Use    Smoking status: Never Smoker    Smokeless tobacco: Never Used   Substance Use Topics    Alcohol use: Yes     Comment: rare    Drug use: No             HISTORY OF PRESENT ILLNESS: Pro Felix is a 47 y.o. female who presents to the office for a routine follow up visit.        Hypertension -   Currently, the patient's condition is well controlled.   Reports compliance with the following regimen: metoprolol 100 mg daily, lisinopril-HCTZ 20-25 mg daily     Prediabetes/Morbid Obesity -  Stable with most recent A1c 5.9% in August 2019. She would like orders for repeat labs today. Her weight has remained stable since her last visit. Admits she has not been aggressively focusing on diet and exercise as of late. REVIEW OF SYSTEMS:  Review of Systems   Constitutional: Negative for chills and fever. Respiratory: Negative for cough, shortness of breath and wheezing. Cardiovascular: Negative for chest pain, palpitations and leg swelling. PHYSICAL EXAMINATION:  Physical Exam  Cardiovascular:      Rate and Rhythm: Normal rate and regular rhythm. Heart sounds: Normal heart sounds. No murmur. No friction rub. No gallop. Pulmonary:      Effort: Pulmonary effort is normal.      Breath sounds: Normal breath sounds. No wheezing or rales. Skin:     General: Skin is warm and dry. Findings: No rash. Neurological:      Mental Status: She is alert and oriented to person, place, and time. Gait: Gait is intact. Psychiatric:         Mood and Affect: Mood and affect normal.         Cognition and Memory: Memory normal.          RESULTS:  No results found for this visit on 03/02/20. ASSESSMENT/PLAN:  Diagnoses and all orders for this visit:    1. Essential hypertension  - Well controlled  - Meds refilled  - Update labs  Orders:    -     lisinopril-hydroCHLOROthiazide (PRINZIDE, ZESTORETIC) 20-25 mg per tablet; TAKE 1 TABLET BY MOUTH DAILY  -     metoprolol tartrate (LOPRESSOR) 100 mg IR tablet; Take 1 Tab by mouth daily for 180 days.  -     METABOLIC PANEL, COMPREHENSIVE; Future  -     LIPID PANEL; Future    2. Prediabetes  - Update fasting labs  - Continue to work on weight loss through diet & exercise   Orders:    -     HEMOGLOBIN A1C W/O EAG; Future    3. Screening for colon cancer  - Referred to GI for colonoscopy        Follow-up and Dispositions    · Return in about 6 months (around 9/2/2020) for routine care and fasting labs to be done ASAP (Dr. Abhijeet Hernandez) . All questions answered. Patient expresses understanding and agrees with the plan as detailed above.     PATIENT CARE TEAM:   Patient Care Team:  CHRISSY Sams as PCP - General (Physician Assistant)  CHRISSY Sams as PCP - [facility] Empaneled Provider       CHRISSY Alexander   March 2, 2020   4:47 PM

## 2020-03-02 NOTE — PATIENT INSTRUCTIONS
DASH Diet: Care Instructions Your Care Instructions The DASH diet is an eating plan that can help lower your blood pressure. DASH stands for Dietary Approaches to Stop Hypertension. Hypertension is high blood pressure. The DASH diet focuses on eating foods that are high in calcium, potassium, and magnesium. These nutrients can lower blood pressure. The foods that are highest in these nutrients are fruits, vegetables, low-fat dairy products, nuts, seeds, and legumes. But taking calcium, potassium, and magnesium supplements instead of eating foods that are high in those nutrients does not have the same effect. The DASH diet also includes whole grains, fish, and poultry. The DASH diet is one of several lifestyle changes your doctor may recommend to lower your high blood pressure. Your doctor may also want you to decrease the amount of sodium in your diet. Lowering sodium while following the DASH diet can lower blood pressure even further than just the DASH diet alone. Follow-up care is a key part of your treatment and safety. Be sure to make and go to all appointments, and call your doctor if you are having problems. It's also a good idea to know your test results and keep a list of the medicines you take. How can you care for yourself at home? Following the DASH diet · Eat 4 to 5 servings of fruit each day. A serving is 1 medium-sized piece of fruit, ½ cup chopped or canned fruit, 1/4 cup dried fruit, or 4 ounces (½ cup) of fruit juice. Choose fruit more often than fruit juice. · Eat 4 to 5 servings of vegetables each day. A serving is 1 cup of lettuce or raw leafy vegetables, ½ cup of chopped or cooked vegetables, or 4 ounces (½ cup) of vegetable juice. Choose vegetables more often than vegetable juice. · Get 2 to 3 servings of low-fat and fat-free dairy each day. A serving is 8 ounces of milk, 1 cup of yogurt, or 1 ½ ounces of cheese. · Eat 6 to 8 servings of grains each day. A serving is 1 slice of bread, 1 ounce of dry cereal, or ½ cup of cooked rice, pasta, or cooked cereal. Try to choose whole-grain products as much as possible. · Limit lean meat, poultry, and fish to 2 servings each day. A serving is 3 ounces, about the size of a deck of cards. · Eat 4 to 5 servings of nuts, seeds, and legumes (cooked dried beans, lentils, and split peas) each week. A serving is 1/3 cup of nuts, 2 tablespoons of seeds, or ½ cup of cooked beans or peas. · Limit fats and oils to 2 to 3 servings each day. A serving is 1 teaspoon of vegetable oil or 2 tablespoons of salad dressing. · Limit sweets and added sugars to 5 servings or less a week. A serving is 1 tablespoon jelly or jam, ½ cup sorbet, or 1 cup of lemonade. · Eat less than 2,300 milligrams (mg) of sodium a day. If you limit your sodium to 1,500 mg a day, you can lower your blood pressure even more. Tips for success · Start small. Do not try to make dramatic changes to your diet all at once. You might feel that you are missing out on your favorite foods and then be more likely to not follow the plan. Make small changes, and stick with them. Once those changes become habit, add a few more changes. · Try some of the following: ? Make it a goal to eat a fruit or vegetable at every meal and at snacks. This will make it easy to get the recommended amount of fruits and vegetables each day. ? Try yogurt topped with fruit and nuts for a snack or healthy dessert. ? Add lettuce, tomato, cucumber, and onion to sandwiches. ? Combine a ready-made pizza crust with low-fat mozzarella cheese and lots of vegetable toppings. Try using tomatoes, squash, spinach, broccoli, carrots, cauliflower, and onions. ? Have a variety of cut-up vegetables with a low-fat dip as an appetizer instead of chips and dip. ? Sprinkle sunflower seeds or chopped almonds over salads.  Or try adding chopped walnuts or almonds to cooked vegetables. ? Try some vegetarian meals using beans and peas. Add garbanzo or kidney beans to salads. Make burritos and tacos with mashed preston beans or black beans. Where can you learn more? Go to http://ivan-joshua.info/. Enter N969 in the search box to learn more about \"DASH Diet: Care Instructions. \" Current as of: April 9, 2019 Content Version: 12.2 © 3393-8423 [employer]. Care instructions adapted under license by [employer] (which disclaims liability or warranty for this information). If you have questions about a medical condition or this instruction, always ask your healthcare professional. [address] any warranty or liability for your use of this information.

## 2020-03-02 NOTE — PROGRESS NOTES
1. Have you been to the ER, urgent care clinic since your last visit? Hospitalized since your last visit? No    2. Have you seen or consulted any other health care providers outside of the [address] [place] [address] since your last visit? Include any pap smears or colon screening.  No

## 2020-03-17 ENCOUNTER — OFFICE VISIT (OUTPATIENT)
Dept: FAMILY MEDICINE CLINIC | Age: 50
End: 2020-03-17

## 2020-03-17 VITALS
SYSTOLIC BLOOD PRESSURE: 126 MMHG | TEMPERATURE: 98.1 F | DIASTOLIC BLOOD PRESSURE: 77 MMHG | HEART RATE: 72 BPM | RESPIRATION RATE: 20 BRPM | WEIGHT: 284 LBS | OXYGEN SATURATION: 99 % | BODY MASS INDEX: 52.26 KG/M2 | HEIGHT: 62 IN

## 2020-03-17 DIAGNOSIS — J30.2 SEASONAL ALLERGIC RHINITIS, UNSPECIFIED TRIGGER: ICD-10-CM

## 2020-03-17 DIAGNOSIS — J02.9 SORE THROAT: Primary | ICD-10-CM

## 2020-03-17 LAB
S PYO AG THROAT QL: NEGATIVE
VALID INTERNAL CONTROL?: YES

## 2020-03-17 RX ORDER — LORATADINE 10 MG/1
10 TABLET ORAL
Qty: 90 TAB | Refills: 1 | Status: SHIPPED | OUTPATIENT
Start: 2020-03-17 | End: 2020-11-23 | Stop reason: SDUPTHER

## 2020-03-17 NOTE — PATIENT INSTRUCTIONS
· course of antibiotics. · Gargle with warm salt water once an hour to help reduce swelling and relieve discomfort. Use 1 teaspoon of salt mixed in 1 cup of warm water. · Take an over-the-counter pain medicine, such as acetaminophen (Tylenol), ibuprofen (Advil, Motrin), or naproxen (Aleve). Read and follow all instructions on the label. · Be careful when taking over-the-counter cold or flu medicines and Tylenol at the same time. Many of these medicines have acetaminophen, which is Tylenol. Read the labels to make sure that you are not taking more than the recommended dose. Too much acetaminophen (Tylenol) can be harmful. · Drink plenty of fluids. Fluids may help soothe an irritated throat. Hot fluids, such as tea or soup, may help decrease throat pain. · Use over-the-counter throat lozenges to soothe pain. Regular cough drops or hard candy may also help. These should not be given to young children because of the risk of choking. · Do not smoke or allow others to smoke around you. If you need help quitting, talk to your doctor about stop-smoking programs and medicines. These can increase your chances of quitting for good. · Use a vaporizer or humidifier to add moisture to your bedroom. Follow the directions for cleaning the machine. When should you call for help? Call your doctor now or seek immediate medical care if: 
  · You have new or worse trouble swallowing.  
  · Your sore throat gets much worse on one side.  
 Watch closely for changes in your health, and be sure to contact your doctor if you do not get better as expected.

## 2020-03-17 NOTE — PROGRESS NOTES
Charlie Good presents today for   Chief Complaint   Patient presents with    Sore Throat     X 1 day    Ear Pain     X 1 day       Is someone accompanying this pt? no    Is the patient using any DME equipment during OV? no    Depression Screening:  3 most recent PHQ Screens 3/17/2020   PHQ Not Done -   Little interest or pleasure in doing things Not at all   Feeling down, depressed, irritable, or hopeless Not at all   Total Score PHQ 2 0   Trouble falling or staying asleep, or sleeping too much -   Feeling tired or having little energy -   Poor appetite, weight loss, or overeating -   Feeling bad about yourself - or that you are a failure or have let yourself or your family down -   Trouble concentrating on things such as school, work, reading, or watching TV -   Moving or speaking so slowly that other people could have noticed; or the opposite being so fidgety that others notice -   Thoughts of being better off dead, or hurting yourself in some way -   PHQ 9 Score -       Learning Assessment:  Learning Assessment 2/19/2019   PRIMARY LEARNER Patient   HIGHEST LEVEL OF EDUCATION - PRIMARY LEARNER  -   BARRIERS PRIMARY LEARNER NONE   CO-LEARNER CAREGIVER No   CO-LEARNER NAME -   [facility] -   ANSWERED BY patient   RELATIONSHIP SELF       Abuse Screening:  Abuse Screening Questionnaire 3/2/2020   Do you ever feel afraid of your partner? N   Are you in a relationship with someone who physically or mentally threatens you? N   Is it safe for you to go home? Y       Fall Risk  No flowsheet data found. Health Maintenance reviewed and discussed and ordered per Provider.     Health Maintenance Due   Topic Date Due    DTaP/Tdap/Td series (1 - Tdap) 11/20/1990    PAP AKA CERVICAL CYTOLOGY  10/28/2019    Shingrix Vaccine Age 50> (1 of 2) 11/20/2019    Breast Cancer Screen Mammogram  11/20/2019    FOBT Q1Y Age 50-75  11/20/2019   . Coordination of Care:  1. Have you been to the ER, urgent care clinic since your last visit? Hospitalized since your last visit? no    2. Have you seen or consulted any other health care providers outside of the [address] [place] [address] since your last visit? Include any pap smears or colon screening.  no

## 2020-03-17 NOTE — PROGRESS NOTES
PROBLEM/SICK OFFICE NOTE (SOAP)    3/17/2020  9:14 AM    SUBJECTIVE:    Chief Complaint   Patient presents with    Sore Throat     X 1 day    Ear Pain     X 1 day       HPI:  Stephon Goodson is a 47 y.o. female presenting today for office visit. Regular patient of CHRISSY Linda, here today for same day sick appointment with concern for strep throat. Sore throat- this began yesterday. She notes a sore throat and some tenderness and possibly swollen lymph nodes. Granddaughter tested for positive strep last week and was treated. She has been around granddaughter who is only 2 and is concerned she may have spread it. She is also complaining of clogged/itchy feeling in both ears, sinus congestion, post-nasal drip. Sinus drainage is clear. No cough. No fever. Review of Systems:  Review of Systems   Constitutional: Negative for fever. HENT: Positive for congestion, postnasal drip, rhinorrhea, sinus pressure, sneezing and sore throat. Negative for drooling, ear discharge, ear pain, sinus pain, tinnitus, trouble swallowing and voice change. Respiratory: Negative for cough, shortness of breath and wheezing. Cardiovascular: Negative for chest pain and palpitations. Neurological: Negative for headaches.          Depression- PHQ Screening   3 most recent PHQ Screens 3/17/2020   PHQ Not Done -   Little interest or pleasure in doing things Not at all   Feeling down, depressed, irritable, or hopeless Not at all   Total Score PHQ 2 0   Trouble falling or staying asleep, or sleeping too much -   Feeling tired or having little energy -   Poor appetite, weight loss, or overeating -   Feeling bad about yourself - or that you are a failure or have let yourself or your family down -   Trouble concentrating on things such as school, work, reading, or watching TV -   Moving or speaking so slowly that other people could have noticed; or the opposite being so fidgety that others notice -   Thoughts of being better off dead, or hurting yourself in some way -   PHQ 9 Score -         History  Past Medical History:   Diagnosis Date    Acid reflux     Acute cystitis with hematuria     Heel spur     HTN (hypertension), benign 2009    Menorrhagia     Plantar fasciitis     Shoulder bursitis     Urticaria     Vulvovaginitis        Past Surgical History:   Procedure Laterality Date    HX CHOLECYSTECTOMY      HX GYN  2003        HX GYN  2005    BTL       Social History     Socioeconomic History    Marital status: SINGLE     Spouse name: Not on file    Number of children: Not on file    Years of education: Not on file    Highest education level: Not on file   Occupational History    Not on file   Social Needs    Financial resource strain: Not on file    Food insecurity     Worry: Not on file     Inability: Not on file    Transportation needs     Medical: Not on file     Non-medical: Not on file   Tobacco Use    Smoking status: Never Smoker    Smokeless tobacco: Never Used   Substance and Sexual Activity    Alcohol use: Yes     Comment: rare    Drug use: No    Sexual activity: Not Currently     Partners: Male     Birth control/protection: Surgical     Comment: tubal ligation   Lifestyle    Physical activity     Days per week: Not on file     Minutes per session: Not on file    Stress: Not on file   Relationships    Social connections     Talks on phone: Not on file     Gets together: Not on file     Attends [employer] service: Not on file     Active member of club or organization: Not on file     Attends meetings of clubs or organizations: Not on file     Relationship status: Not on file    Intimate partner violence     Fear of current or ex partner: Not on file     Emotionally abused: Not on file     Physically abused: Not on file     Forced sexual activity: Not on file   Other Topics Concern    Not on file   Social History Narrative    Not on file       No Known Allergies    Current Outpatient Medications   Medication Sig Dispense Refill    lisinopril-hydroCHLOROthiazide (PRINZIDE, ZESTORETIC) 20-25 mg per tablet TAKE 1 TABLET BY MOUTH DAILY 90 Tab 1    metoprolol tartrate (LOPRESSOR) 100 mg IR tablet Take 1 Tab by mouth daily for 180 days. 90 Tab 1    cholecalciferol (D3-2000) (2,000 UNITS /50 MCG) cap capsule TAKE 1 CAPSULE BY MOUTH DAILY 30 Cap 5    diclofenac (VOLTAREN) 1 % gel Apply 4 g to affected area two (2) times a day. 100 g 1    topiramate (TOPAMAX) 25 mg tablet 3 tabs PO QHS 90 Tab 1    ibuprofen (MOTRIN) 800 mg tablet Take 1 Tab by mouth every eight (8) hours as needed for Pain. 90 Tab 1           Patient Care Team:  Patient Care Team:  CHRISSY Roberts as PCP - General (Physician Assistant)  CHRISSY Roberts as PCP - [facility]        OBJECTIVE:    Vitals:    03/17/20 0903   BP: 126/77   Pulse: 72   Resp: 20   Temp: 98.1 °F (36.7 °C)   TempSrc: Oral   SpO2: 99%   Weight: 284 lb (128.8 kg)   Height: 5' 2\" (1.575 m)   PainSc:   0 - No pain   LMP: 02/27/2020       Physical Exam  Vitals signs and nursing note reviewed. Constitutional:       General: She is not in acute distress. Appearance: Normal appearance. She is obese. She is not ill-appearing. HENT:      Head: Normocephalic and atraumatic. Right Ear: No middle ear effusion. Tympanic membrane is not erythematous, retracted or bulging. Left Ear: A middle ear effusion is present. Tympanic membrane is not erythematous, retracted or bulging. Ears:      Comments: Bilateral ear canals dry/flaky     Nose: Rhinorrhea present. Rhinorrhea is clear. Right Sinus: No maxillary sinus tenderness or frontal sinus tenderness. Left Sinus: No frontal sinus tenderness. Mouth/Throat:      Lips: Pink. Mouth: Mucous membranes are moist.      Tongue: No lesions. Tongue does not deviate from midline. Palate: No mass and lesions. Pharynx: Oropharynx is clear.  No oropharyngeal exudate, posterior oropharyngeal erythema or uvula swelling. Tonsils: No tonsillar exudate or tonsillar abscesses. 1+ on the right. 1+ on the left. Cardiovascular:      Rate and Rhythm: Normal rate and regular rhythm. Pulmonary:      Effort: Pulmonary effort is normal.      Breath sounds: Normal breath sounds. No wheezing or rhonchi. Skin:     General: Skin is warm and dry. Neurological:      Mental Status: She is alert and oriented to person, place, and time. Assessment & Plan:    Sore throat  Rapid strep negative, will treat supportively. Advised on warm tea with honey, gargling warm salt water, plenty of fluids, OTC throat lozenges, and tylenol for pain relief if needed. - AMB POC RAPID STREP A    Seasonal allergic rhinitis, unspecified trigger  Sore throat likely compounded by seasonal allergies, will also begin Claritin daily for antihistamine relief. - loratadine (Claritin) 10 mg tablet; Take 1 Tab by mouth daily as needed for Allergies. Dispense: 90 Tab; Refill: 1        Orders Placed This Encounter    AMB POC RAPID STREP A    loratadine (Claritin) 10 mg tablet     Sig: Take 1 Tab by mouth daily as needed for Allergies. Dispense:  90 Tab     Refill:  1       Follow-up and Dispositions    · Return if symptoms worsen or fail to improve. Plan of care reviewed with patient. Patient in agreement with plan and expresses understanding. I have discussed when to anticipate results and how results will be communicated, if applicable. Anticipatory guidance given and questions answered, patient encouraged to call or RTO if further questions or concerns.     Myrna Nash NP  03/17/20

## 2020-05-21 ENCOUNTER — HOSPITAL ENCOUNTER (OUTPATIENT)
Dept: LAB | Age: 50
Discharge: HOME | End: 2020-05-21

## 2020-05-21 ENCOUNTER — VIRTUAL VISIT (OUTPATIENT)
Dept: FAMILY MEDICINE CLINIC | Age: 50
End: 2020-05-21

## 2020-05-21 DIAGNOSIS — R35.0 URINARY FREQUENCY: Primary | ICD-10-CM

## 2020-05-21 LAB — SENTARA SPECIMEN COL,SENBCF: NORMAL

## 2020-05-21 PROCEDURE — 99001 SPECIMEN HANDLING PT-LAB: CPT

## 2020-05-21 RX ORDER — NITROFURANTOIN 25; 75 MG/1; MG/1
100 CAPSULE ORAL 2 TIMES DAILY
Qty: 14 CAP | Refills: 0 | Status: SHIPPED | OUTPATIENT
Start: 2020-05-21 | End: 2020-05-28

## 2020-05-21 NOTE — PROGRESS NOTES
Darryl Davalos is a 47 y.o. female who was seen by synchronous (real-time) audio-video technology on 5/21/2020. Consent: Darryl Davalos, who was seen by synchronous (real-time) audio-video technology, and/or her healthcare decision maker, is aware that this patient-initiated, Telehealth encounter on 5/21/2020 is a billable service, with coverage as determined by her insurance carrier. She is aware that she may receive a bill and has provided verbal consent to proceed: Yes. Assessment & Plan:   Diagnoses and all orders for this visit:    1. Urinary frequency  - Symptoms sound consistent with cystitis   - Will treat empirically with macrobid as below  - Patient to notify the office if symptoms persist or worsen, would consider sending to lab for urine studies at that point  Orders:    -     nitrofurantoin, macrocrystal-monohydrate, (Macrobid) 100 mg capsule; Take 1 Cap by mouth two (2) times a day for 7 days. Follow-up and Dispositions    · Return in about 4 months (around 9/21/2020) for routine care with Dr. Inna Mancini.        712  Subjective:   Darryl Davalos is a 47 y.o. female who was seen for Urinary Frequency      Prior to Admission medications    Medication Sig Start Date End Date Taking? Authorizing Provider   nitrofurantoin, macrocrystal-monohydrate, (Macrobid) 100 mg capsule Take 1 Cap by mouth two (2) times a day for 7 days. 5/21/20 5/28/20 Yes Anny Richards PA   loratadine (Claritin) 10 mg tablet Take 1 Tab by mouth daily as needed for Allergies. 3/17/20   Nirmal Obrien NP   lisinopril-hydroCHLOROthiazide (PRINZIDE, ZESTORETIC) 20-25 mg per tablet TAKE 1 TABLET BY MOUTH DAILY 3/2/20   JUNITO Richards [facility] CHRISSY HOOD   metoprolol tartrate (LOPRESSOR) 100 mg IR tablet Take 1 Tab by mouth daily for 180 days.  3/2/20 8/29/20  CHRISSY Mcrae   cholecalciferol (D3-2000) (2,000 UNITS /50 MCG) cap capsule TAKE 1 CAPSULE BY MOUTH DAILY 3/2/20   CHRISSY Mcrae   diclofenac (VOLTAREN) 1 % gel Apply 4 g to affected area two (2) times a day. 10/23/19   Camelia Linn MD   topiramate (TOPAMAX) 25 mg tablet 3 tabs PO QHS 9/24/19 5/21/20  Mahesh Lin MD   ibuprofen (MOTRIN) 800 mg tablet Take 1 Tab by mouth every eight (8) hours as needed for Pain. 8/9/19   Sondra Richards PA     No Known Allergies    Patient is a 47 y.o. female with PMH prediabetes, HTN ,and morbid obesity, who is being seen today via telemedicine for acute care. She complains of urinary frequency, urgency, \"burning\" dysuria, and \"strong\" urinary odor x 2 days. She has a history of prior urinary tract infections, and states that symptoms feel similar. She admits to mild back pain, but this is chronic. Denies fevers, chills, flank pain, hematuria, or vaginal discharge. Review of Systems   Constitutional: Negative for chills and fever. Respiratory: Negative for shortness of breath. Cardiovascular: Negative for chest pain. Gastrointestinal: Negative for abdominal pain, nausea and vomiting. Genitourinary: Positive for dysuria, frequency and urgency. Negative for flank pain and hematuria. Objective: There were no vitals taken for this visit. General: alert, cooperative, no distress   Mental  status: normal mood, behavior, speech, dress, motor activity, and thought processes, able to follow commands   HENT: NCAT   Neck: no visualized mass   Resp: no respiratory distress   Neuro: no gross deficits   Skin: no discoloration or lesions of concern on visible areas   Psychiatric: normal affect, consistent with stated mood, no evidence of hallucinations     Additional exam findings: We discussed the expected course, resolution and complications of the diagnosis(es) in detail. Medication risks, benefits, costs, interactions, and alternatives were discussed as indicated. I advised her to contact the office if her condition worsens, changes or fails to improve as anticipated.  She expressed understanding with the diagnosis(es) and plan. Justo Hollingsworth is a 47 y.o. female who was evaluated by a video visit encounter for concerns as above. Patient identification was verified prior to start of the visit. A caregiver was present when appropriate. Due to this being a TeleHealth encounter (During [facility] public health emergency), evaluation of the following organ systems was limited: Vitals/Constitutional/EENT/Resp/CV/GI//MS/Neuro/Skin/Heme-Lymph-Imm. Pursuant to the emergency declaration under the [address] [place] [address], [facility] waiver authority and the [employer] and [employer] Act, this Virtual  Visit was conducted, with patient's (and/or legal guardian's) consent, to reduce the patient's risk of exposure to COVID-19 and provide necessary medical care. Services were provided through a video synchronous discussion virtually to substitute for in-person clinic visit. Patient and provider were located at their individual homes.       CHRISSY Camacho   5/21/2020  11:32 AM

## 2020-05-22 LAB
A-G RATIO,AGRAT: 1.1 RATIO (ref 1.1–2.6)
ALBUMIN SERPL-MCNC: 3.9 G/DL (ref 3.5–5)
ALP SERPL-CCNC: 70 U/L (ref 25–115)
ALT SERPL-CCNC: 28 U/L (ref 5–40)
ANION GAP SERPL CALC-SCNC: 14 MMOL/L
AST SERPL W P-5'-P-CCNC: 28 U/L (ref 10–37)
AVG GLU, 10930: 132 MG/DL (ref 91–123)
BILIRUB SERPL-MCNC: 0.2 MG/DL (ref 0.2–1.2)
BUN SERPL-MCNC: 11 MG/DL (ref 6–22)
CALCIUM SERPL-MCNC: 9.6 MG/DL (ref 8.4–10.5)
CHLORIDE SERPL-SCNC: 98 MMOL/L (ref 98–110)
CHOLEST SERPL-MCNC: 137 MG/DL (ref 110–200)
CO2 SERPL-SCNC: 29 MMOL/L (ref 20–32)
CREAT SERPL-MCNC: 0.7 MG/DL (ref 0.5–1.2)
GFRAA, 66117: >60
GFRNA, 66118: >60
GLOBULIN,GLOB: 3.5 G/DL (ref 2–4)
GLUCOSE SERPL-MCNC: 107 MG/DL (ref 70–99)
HBA1C MFR BLD HPLC: 6.2 % (ref 4.8–5.6)
HDLC SERPL-MCNC: 3.1 MG/DL (ref 0–5)
HDLC SERPL-MCNC: 44 MG/DL
LDL/HDL RATIO,LDHD: 1.8
LDLC SERPL CALC-MCNC: 79 MG/DL (ref 50–99)
NON-HDL CHOLESTEROL, 011976: 93 MG/DL
POTASSIUM SERPL-SCNC: 3.9 MMOL/L (ref 3.5–5.5)
PROT SERPL-MCNC: 7.4 G/DL (ref 6.4–8.3)
SODIUM SERPL-SCNC: 141 MMOL/L (ref 133–145)
TRIGL SERPL-MCNC: 68 MG/DL (ref 40–149)
VLDLC SERPL CALC-MCNC: 14 MG/DL (ref 8–30)

## 2020-05-25 NOTE — PROGRESS NOTES
Blood sugar is still a bit elevated in the prediabetic range. A1c has increased from 5.9% in August. Continue to work on weight loss and low carb diet. She will be due to f/u with Dr. Noris Rojas in September, and A1c can be re-checked at that time. If no improvement, would recommend starting metformin.      Labs look great otherwise (cholesterol, kidney & liver function)

## 2020-05-26 ENCOUNTER — TELEPHONE (OUTPATIENT)
Dept: FAMILY MEDICINE CLINIC | Age: 50
End: 2020-05-26

## 2020-05-26 NOTE — PROGRESS NOTES
[phone] all patient identifiers verified with Bill Edward was advised that Arturo Ricketts has reviewed her labs and she advise her blood sugar is still a bit elevated in the prediabetic range. A1c has increased from 5.9 in August of 2019 to 6.2. Continue to work on weight loss and low carb diet. Labs look great otherwise (cholesterol, kidney & liver function). Jayashree Jolley was also advised she will be due to follow up with Dr. Noris Rojas in September and A1C can be re-checked at that time. If no improvement, would recommend starting metformin.  Judy voice all under standing and no further questions at this time

## 2020-08-10 ENCOUNTER — VIRTUAL VISIT (OUTPATIENT)
Dept: FAMILY MEDICINE CLINIC | Age: 50
End: 2020-08-10

## 2020-08-10 DIAGNOSIS — R35.0 URINE FREQUENCY: Primary | ICD-10-CM

## 2020-08-10 DIAGNOSIS — R35.0 URINE FREQUENCY: ICD-10-CM

## 2020-08-10 NOTE — PROGRESS NOTES
Jed Coughlin is a 47 y.o. female who was seen by synchronous (real-time) audio-video technology on 8/10/2020 for Urinary Pain        Assessment & Plan:   Diagnoses and all orders for this visit:    Urine frequency: Started symptoms 3 days ago. No blood or dysuria. Some lower abdominal discomfort. No vagina discharge. No menstrual cycle since March 2020. For now will check UA, culture, CBC and BMP. Advised her to avoid taking ibuprofen. Mild nausea. No fever. No back pain. No prior history of kidney stone. Does have a history of recurrent UTI. Once we have a UA and culture result will discuss further treatment  -     URINALYSIS W/ RFLX MICROSCOPIC; Future  -     CULTURE, URINE; Future  -     CBC WITH AUTOMATED DIFF; Future  -     METABOLIC PANEL, BASIC; Future    Patient understood and agreed with above plan  Review health maintenance  Nurse to advised to obtain the Pap records which was done April 2020 at [facility]. Nurse to advised to provide the contact info for gastroenterology to schedule an appointment for colonoscopy  Also nurse to advised to provide the central scheduling number for the mammogram to be scheduled  [phone]  Subjective:   Done visit with audiovideo technology  Done visit with Doxy  Complaining of urinary frequency and lower abdominal discomfort since last 3 days. Said some nausea on and off but no vomiting. No fever. Said lower abdominal discomfort but no vagina discharge, no diarrhea or constipation. No blood in the urine or any dysuria. Sitting comfortable during virtual visit and did not appear in any acute distress. No headache or dizziness, no chest pain, no appetite or weight changes. Does have a history of recurrent UTI. No prior history of kidney stone. Prior to Admission medications    Medication Sig Start Date End Date Taking? Authorizing Provider   loratadine (Claritin) 10 mg tablet Take 1 Tab by mouth daily as needed for Allergies.  3/17/20  Yes Mir Villaseñor [facility], NP   lisinopril-hydroCHLOROthiazide (PRINZIDE, ZESTORETIC) 20-25 mg per tablet TAKE 1 TABLET BY MOUTH DAILY 3/2/20  Yes Anny Richards PA   metoprolol tartrate (LOPRESSOR) 100 mg IR tablet Take 1 Tab by mouth daily for 180 days. 3/2/20 8/29/20 Yes Hayde Richards PA   cholecalciferol (D3-2000) (2,000 UNITS /50 MCG) cap capsule TAKE 1 CAPSULE BY MOUTH DAILY 3/2/20  Yes Anny Richards PA   diclofenac (VOLTAREN) 1 % gel Apply 4 g to affected area two (2) times a day. 10/23/19   Alfred Giordano MD   ibuprofen (MOTRIN) 800 mg tablet Take 1 Tab by mouth every eight (8) hours as needed for Pain. 8/9/19   CHRISSY Zheng     Patient Active Problem List    Diagnosis Date Noted    Prediabetes 10/29/2018    Morbid obesity with BMI of 45.0-49.9, adult ([facility] [address] 10/29/2018    Essential hypertension 03/01/2018     Current Outpatient Medications   Medication Sig Dispense Refill    loratadine (Claritin) 10 mg tablet Take 1 Tab by mouth daily as needed for Allergies. 90 Tab 1    lisinopril-hydroCHLOROthiazide (PRINZIDE, ZESTORETIC) 20-25 mg per tablet TAKE 1 TABLET BY MOUTH DAILY 90 Tab 1    metoprolol tartrate (LOPRESSOR) 100 mg IR tablet Take 1 Tab by mouth daily for 180 days. 90 Tab 1    cholecalciferol (D3-2000) (2,000 UNITS /50 MCG) cap capsule TAKE 1 CAPSULE BY MOUTH DAILY 30 Cap 5    diclofenac (VOLTAREN) 1 % gel Apply 4 g to affected area two (2) times a day. 100 g 1    ibuprofen (MOTRIN) 800 mg tablet Take 1 Tab by mouth every eight (8) hours as needed for Pain. 80 Tab 1     No Known Allergies  Past Medical History:   Diagnosis Date    Acid reflux     Acute cystitis with hematuria     Heel spur     HTN (hypertension), benign 12/2/2009    Menorrhagia     Plantar fasciitis     Shoulder bursitis     Urticaria     Vulvovaginitis      Social History     Tobacco Use    Smoking status: Never Smoker    Smokeless tobacco: Never Used   Substance Use Topics    Alcohol use:  Yes Comment: rare       ROS: See HPI    Objective:     Patient-Reported Vitals 8/10/2020   Patient-Reported LMP march 2020      General: alert, cooperative, no distress   Mental  status: normal mood, behavior, speech, dress, motor activity, and thought processes, able to follow commands   HENT: NCAT   Neck: no visualized mass   Resp: no respiratory distress   Neuro: no gross deficits   Skin: no discoloration or lesions of concern on visible areas   Psychiatric: normal affect, consistent with stated mood, no evidence of hallucinations     Additional exam findings: We discussed the expected course, resolution and complications of the diagnosis(es) in detail. Medication risks, benefits, costs, interactions, and alternatives were discussed as indicated. I advised her to contact the office if her condition worsens, changes or fails to improve as anticipated. She expressed understanding with the diagnosis(es) and plan. Jed Coughlin, who was evaluated through a patient-initiated, synchronous (real-time) audio-video encounter, and/or her healthcare decision maker, is aware that it is a billable service, with coverage as determined by her insurance carrier. She provided verbal consent to proceed: Yes, and patient identification was verified. It was conducted pursuant to the emergency declaration under the [address] [place] [address], [address] [place] [address] authority and the [employer] General Act. A caregiver was present when appropriate. Ability to conduct physical exam was limited. I was in the office. The patient was at home.       Robert Peter MD

## 2020-08-12 ENCOUNTER — HOSPITAL ENCOUNTER (OUTPATIENT)
Dept: LAB | Age: 50
Discharge: HOME | End: 2020-08-12

## 2020-08-12 LAB — SENTARA SPECIMEN COL,SENBCF: NORMAL

## 2020-08-12 PROCEDURE — 99001 SPECIMEN HANDLING PT-LAB: CPT

## 2020-08-14 LAB
ABSOLUTE LYMPHOCYTE COUNT, 10803: 1.6 K/UL (ref 1–4.8)
ANION GAP SERPL CALC-SCNC: 11 MMOL/L (ref 3–15)
BASOPHILS # BLD: 0.1 K/UL (ref 0–0.2)
BASOPHILS NFR BLD: 1 % (ref 0–2)
BILIRUB UR QL: NEGATIVE
BUN SERPL-MCNC: 17 MG/DL (ref 6–22)
CALCIUM SERPL-MCNC: 9.5 MG/DL (ref 8.4–10.5)
CHLORIDE SERPL-SCNC: 99 MMOL/L (ref 98–110)
CLARITY: CLEAR
CO2 SERPL-SCNC: 29 MMOL/L (ref 20–32)
COLOR UR: YELLOW
CREAT SERPL-MCNC: 0.7 MG/DL (ref 0.5–1.2)
EOSINOPHIL # BLD: 0.2 K/UL (ref 0–0.5)
EOSINOPHIL NFR BLD: 2 % (ref 0–6)
ERYTHROCYTE [DISTWIDTH] IN BLOOD BY AUTOMATED COUNT: 14.5 % (ref 10–15.5)
GFRAA, 66117: >60
GFRNA, 66118: >60
GLUCOSE SERPL-MCNC: 103 MG/DL (ref 70–99)
GLUCOSE UR QL: NEGATIVE MG/DL
GRANULOCYTES,GRANS: 69 % (ref 40–75)
HCT VFR BLD AUTO: 43.6 % (ref 35.1–48)
HGB BLD-MCNC: 13.4 G/DL (ref 11.7–16)
HGB UR QL STRIP: NEGATIVE
KETONES UR QL STRIP.AUTO: NEGATIVE MG/DL
LEUKOCYTE ESTERASE: NEGATIVE
LYMPHOCYTES, LYMLT: 21 % (ref 20–45)
MCH RBC QN AUTO: 28 PG (ref 26–34)
MCHC RBC AUTO-ENTMCNC: 31 G/DL (ref 31–36)
MCV RBC AUTO: 91 FL (ref 81–99)
MONOCYTES # BLD: 0.5 K/UL (ref 0.1–1)
MONOCYTES NFR BLD: 7 % (ref 3–12)
NEUTROPHILS # BLD AUTO: 5.3 K/UL (ref 1.8–7.7)
NITRITE UR QL STRIP.AUTO: NEGATIVE
PH UR STRIP: 6 PH (ref 5–8)
PLATELET # BLD AUTO: 307 K/UL (ref 140–440)
PMV BLD AUTO: 10.2 FL (ref 9–13)
POTASSIUM SERPL-SCNC: 4.6 MMOL/L (ref 3.5–5.5)
PROT UR QL STRIP: NEGATIVE MG/DL
RBC # BLD AUTO: 4.81 M/UL (ref 3.8–5.2)
RESULT: NORMAL
SODIUM SERPL-SCNC: 139 MMOL/L (ref 133–145)
SP GR UR: 1.01 (ref 1–1.03)
URINE ASCORBIC ACID: NEGATIVE MG/DL
UROBILINOGEN UR STRIP-MCNC: <2 MG/DL
WBC # BLD AUTO: 7.7 K/UL (ref 4–11)

## 2020-08-14 NOTE — PROGRESS NOTES
UA showed no signs of infection. Culture showed not enough bacteria to treat. No signs of UTI. See other result note.

## 2020-08-18 ENCOUNTER — TELEPHONE (OUTPATIENT)
Dept: FAMILY MEDICINE CLINIC | Age: 50
End: 2020-08-18

## 2020-08-18 NOTE — TELEPHONE ENCOUNTER
Contacted patient and verified identity using name and date of birth (2- identifiers)  Spoke with patient and she verbalized understanding of no signs of UTI on UA or culture. No treatment at this time. All remaining labs are with out significant abnormality.  Further discussion at follow-up

## 2020-10-28 ENCOUNTER — CLINICAL SUPPORT (OUTPATIENT)
Dept: FAMILY MEDICINE CLINIC | Age: 50
End: 2020-10-28
Payer: MEDICAID

## 2020-10-28 DIAGNOSIS — Z23 ENCOUNTER FOR IMMUNIZATION: Primary | ICD-10-CM

## 2020-10-28 PROCEDURE — 99211 OFF/OP EST MAY X REQ PHY/QHP: CPT | Performed by: FAMILY MEDICINE

## 2020-10-28 PROCEDURE — 90686 IIV4 VACC NO PRSV 0.5 ML IM: CPT | Performed by: FAMILY MEDICINE

## 2020-10-28 NOTE — PATIENT INSTRUCTIONS
Influenza (Flu) Vaccine (Inactivated or Recombinant): What You Need to Know Why get vaccinated? Influenza vaccine can prevent influenza (flu). Flu is a contagious disease that spreads around the [place] every year, usually between October and May. Anyone can get the flu, but it is more dangerous for some people. Infants and young children, people 72years of age and older, pregnant women, and people with certain health conditions or a weakened immune system are at greatest risk of flu complications. Pneumonia, bronchitis, sinus infections and ear infections are examples of flu-related complications. If you have a medical condition, such as heart disease, cancer or diabetes, flu can make it worse. Flu can cause fever and chills, sore throat, muscle aches, fatigue, cough, headache, and runny or stuffy nose. Some people may have vomiting and diarrhea, though this is more common in children than adults. Each year, thousands of people in the [facility] die from flu, and many more are hospitalized. Flu vaccine prevents millions of illnesses and flu-related visits to the doctor each year. Influenza vaccine CDC recommends everyone 10months of age and older get vaccinated every flu season. Children 6 months through 6years of age may need 2 doses during a single flu season. Everyone else needs only 1 dose each flu season. It takes about 2 weeks for protection to develop after vaccination. There are many flu viruses, and they are always changing. Each year a new flu vaccine is made to protect against three or four viruses that are likely to cause disease in the upcoming flu season. Even when the vaccine doesn't exactly match these viruses, it may still provide some protection. Influenza vaccine does not cause flu. Influenza vaccine may be given at the same time as other vaccines. Talk with your health care provider Tell your vaccine provider if the person getting the vaccine: · Has had an allergic reaction after a previous dose of influenza vaccine, or has any severe, life-threatening allergies. · Has ever had Guillain-Barré Syndrome (also called GBS). In some cases, your health care provider may decide to postpone influenza vaccination to a future visit. People with minor illnesses, such as a cold, may be vaccinated. People who are moderately or severely ill should usually wait until they recover before getting influenza vaccine. Your health care provider can give you more information. Risks of a vaccine reaction · Soreness, redness, and swelling where shot is given, fever, muscle aches, and headache can happen after influenza vaccine. · There may be a very small increased risk of Guillain-Barré Syndrome (GBS) after inactivated influenza vaccine (the flu shot). Imani Mantle children who get the flu shot along with pneumococcal vaccine (PCV13), and/or DTaP vaccine at the same time might be slightly more likely to have a seizure caused by fever. Tell your health care provider if a child who is getting flu vaccine has ever had a seizure. People sometimes faint after medical procedures, including vaccination. Tell your provider if you feel dizzy or have vision changes or ringing in the ears. As with any medicine, there is a very remote chance of a vaccine causing a severe allergic reaction, other serious injury, or death. What if there is a serious problem? An allergic reaction could occur after the vaccinated person leaves the clinic. If you see signs of a severe allergic reaction (hives, swelling of the face and throat, difficulty breathing, a fast heartbeat, dizziness, or weakness), call 9-1-1 and get the person to the nearest hospital. 
For other signs that concern you, call your health care provider. Adverse reactions should be reported to the Vaccine Adverse Event Reporting System (VAERS).  Your health care provider will usually file this report, or you can do it yourself. Visit the VAERS website at www.vaers. hhs.gov or call 7-558.867.9232. VAERS is only for reporting reactions, and VAERS staff do not give medical advice. The National Vaccine Injury Compensation Program 
The National Vaccine Injury Compensation Program (VICP) is a federal program that was created to compensate people who may have been injured by certain vaccines. Visit the VICP website at www.hrsa.gov/vaccinecompensation or call 6-921.390.1976 to learn about the program and about filing a claim. There is a time limit to file a claim for compensation. How can I learn more? · Ask your healthcare provider. · Call your local or state health department. · Contact the Centers for Disease Control and Prevention (CDC): 
? Call 2-549.725.4427 (1-800-CDC-INFO) or 
? Visit CDC's website at www.cdc.gov/flu Vaccine Information Statement (Interim) Inactivated Influenza Vaccine 8/15/2019 
42 MARCIA Coffey [id number] [facility] and [employer] Centers for Disease Control and Prevention Many Vaccine Information Statements are available in [place] and other languages. See www.immunize.org/vis. Muchas hojas de información sobre vacunas están disponibles en español y en otros idiomas. Visite www.immunize.org/vis. Care instructions adapted under license by [employer] (which disclaims liability or warranty for this information).  If you have questions about a medical condition or this instruction, always ask your healthcare professional. [place] [address] any warranty or liability for your use of this information.

## 2020-10-28 NOTE — PROGRESS NOTES
Chief Complaint   Patient presents with    Immunization/Injection     flu     Patient presents for flu vaccine. Consent obtained, Tolerated procedure well at left deltoid. Patient remained in office 10 minutes post vaccine. No side effects noted.      [id number]  exp 006/30/21  [facility]  [address]: [id number]

## 2020-11-16 ENCOUNTER — TELEPHONE (OUTPATIENT)
Dept: FAMILY MEDICINE CLINIC | Age: 50
End: 2020-11-16

## 2020-11-16 NOTE — TELEPHONE ENCOUNTER
Pt states that she has a sinus infection and it is draining in to her throat and she would like to know if Dr Casey Gibson would either call something in for her or recommend something OTC that she can take. Please advise.  [facility] [phone]

## 2020-11-17 NOTE — TELEPHONE ENCOUNTER
She can try otc flonase along with claritin or zyrtec which I believe she is taking as on current med list. If no improvement in symptoms after this can consider Singulair for seasonal allergies. Can do salt water gargle to help throat irritation.  If needed make an appt

## 2020-11-23 ENCOUNTER — VIRTUAL VISIT (OUTPATIENT)
Dept: FAMILY MEDICINE CLINIC | Age: 50
End: 2020-11-23
Payer: MEDICAID

## 2020-11-23 DIAGNOSIS — E66.01 MORBID OBESITY WITH BMI OF 45.0-49.9, ADULT (HCC): ICD-10-CM

## 2020-11-23 DIAGNOSIS — J30.2 SEASONAL ALLERGIC RHINITIS, UNSPECIFIED TRIGGER: ICD-10-CM

## 2020-11-23 DIAGNOSIS — U07.1 LAB TEST POSITIVE FOR DETECTION OF COVID-19 VIRUS: Primary | ICD-10-CM

## 2020-11-23 DIAGNOSIS — I10 ESSENTIAL HYPERTENSION: ICD-10-CM

## 2020-11-23 DIAGNOSIS — R09.81 SINUS CONGESTION: ICD-10-CM

## 2020-11-23 DIAGNOSIS — R05.9 COUGH: ICD-10-CM

## 2020-11-23 DIAGNOSIS — R73.03 PREDIABETES: ICD-10-CM

## 2020-11-23 PROCEDURE — 99214 OFFICE O/P EST MOD 30 MIN: CPT | Performed by: FAMILY MEDICINE

## 2020-11-23 RX ORDER — LORATADINE 10 MG/1
10 TABLET ORAL
Qty: 90 TAB | Refills: 1 | Status: SHIPPED | OUTPATIENT
Start: 2020-11-23 | End: 2022-05-18 | Stop reason: SDUPTHER

## 2020-11-23 RX ORDER — FLUTICASONE PROPIONATE 50 MCG
SPRAY, SUSPENSION (ML) NASAL
Qty: 1 BOTTLE | Refills: 1 | Status: SHIPPED | OUTPATIENT
Start: 2020-11-23 | End: 2022-01-04

## 2020-11-23 RX ORDER — ALBUTEROL SULFATE 90 UG/1
2 AEROSOL, METERED RESPIRATORY (INHALATION)
Qty: 1 INHALER | Refills: 0 | Status: SHIPPED | OUTPATIENT
Start: 2020-11-23 | End: 2020-12-09 | Stop reason: SDUPTHER

## 2020-11-23 NOTE — PROGRESS NOTES
Shyann Smith is a 45 y.o. 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 5 family members been current time. Initially son has started the symptoms on a Sunday and had a positive test.  She had loss of smell as a symptoms. Currently having mild cough. On and off sputum. Sinus congestion and postnasal drip. No shortness of breath or chest pain. No wheezing. No headache or dizziness. During visit she was sitting comfortable and did not appear in any acute distress. Had no fever. Advised to take the symptomatic treatment of Claritin, Flonase and albuterol as needed. If symptom goes worse or any fever or shortness of breath she should go to urgent care to obtain the x-ray to rule out pneumonia. Essential hypertension: Does check her blood pressure at home. It is around 120/78. She was asymptomatic during visit. Will observe and continue current plan    Prediabetes: A1c in prediabetic range around 6.2 back in May. Will recheck labs next visit. Meantime advised to carb diet. Exercise and importance of weight loss    Morbid obesity with BMI of 45.0-49.9, adult ([address]): Again lifestyle modification    Seasonal allergic rhinitis, unspecified trigger  -     loratadine (Claritin) 10 mg tablet; Take 1 Tab by mouth daily as needed for Allergies. , Normal, Disp-90 Tab,R-1    Sinus congestion: Symptomatic treatment as above    Cough: Symptomatic treatment as above    Patient understood and agreed with the plan  Advised to get the Tdap and shingles vaccine from the pharmacy once she recovers from the current Covid symptoms. Pap smear she had it at [facility]. We will try to obtain the records  Referral to GI been done by prior PCP. She was given the contact number today to make a follow-up appointment  for colonoscopy consultation. She has a pending mammogram.  Given contact number for central scheduling to complete the test once she recovers from the Covid symptoms and negative test.  [phone]  Subjective:   Done visit through doxy  Here for follow-up. Diagnosed with the Covid positive test today. Symptoms started on Wednesday. She lost smell. 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 Son was also having the symptoms on Sunday and was  tested positive. She is scheduled for retest by work at 14th day. Said her smell is coming back. She does have some cough and sinus congestion. On and off sputum. No wheezing or any chest pain or shortness of breath. Had no fever. Able to talk in full sentence. Not using any extra respiratory muscle. Sitting comfortable and did not appear in any acute distress. History of hypertension. Checking blood pressure at home. Said usually around 120/78. She was asymptomatic during visit. Denies any headache or dizziness. No nausea vomiting or abdominal pain. No urinary or bowel complaint. No chest pain or shortness of breath. No diaphoresis or palpitation. No appetite or weight changes. No mood changes. Sleep has been fair. Prediabetes. High BMI. Discussed the importance of the diet modification and lifestyle modification.   Lab Results   Component Value Date/Time    WBC 7.7 08/12/2020 09:55 AM    HGB 13.4 08/12/2020 09:55 AM    HCT 43.6 08/12/2020 09:55 AM    PLATELET 034 31/76/3283 09:55 AM    MCV 91 08/12/2020 09:55 AM     Lab Results   Component Value Date/Time    Sodium 139 08/12/2020 09:55 AM    Potassium 4.6 08/12/2020 09:55 AM    Chloride 99 08/12/2020 09:55 AM    CO2 29 08/12/2020 09:55 AM    Anion gap 11.0 08/12/2020 09:55 AM    Glucose 103 (H) 08/12/2020 09:55 AM    BUN 17 08/12/2020 09:55 AM    Creatinine 0.7 08/12/2020 09:55 AM    BUN/Creatinine ratio 14 04/16/2015 01:43 AM    GFR est AA >60 04/16/2015 01:43 AM    GFR est non-AA >60 04/16/2015 01:43 AM    Calcium 9.5 08/12/2020 09:55 AM    Bilirubin, total 0.2 05/21/2020 08:24 AM    Alk. phosphatase 70 05/21/2020 08:24 AM    Protein, total 7.4 05/21/2020 08:24 AM    Albumin 3.9 05/21/2020 08:24 AM    Globulin 3.5 05/21/2020 08:24 AM    A-G Ratio 1.1 05/21/2020 08:24 AM    ALT (SGPT) 28 05/21/2020 08:24 AM    AST (SGOT) 28 05/21/2020 08:24 AM     Lab Results   Component Value Date/Time    Cholesterol, total 137 05/21/2020 08:24 AM    HDL Cholesterol 44 05/21/2020 08:24 AM    LDL, calculated 79 05/21/2020 08:24 AM    VLDL, calculated 14 05/21/2020 08:24 AM    Triglyceride 68 05/21/2020 08:24 AM    CHOL/HDL Ratio 2.7 06/15/2010 12:20 PM     Lab Results   Component Value Date/Time    TSH 2.51 07/25/2018 10:11 AM     Lab Results   Component Value Date/Time    Hemoglobin A1c 6.2 (H) 05/21/2020 08:24 AM    Hemoglobin A1c (POC) 6.0 12/20/2019 03:17 PM     Lab Results   Component Value Date/Time    VITAMIN D, 25-HYDROXY 26.6 (L) 11/13/2018 08:15 AM           Prior to Admission medications    Medication Sig Start Date End Date Taking? Authorizing Provider   loratadine (Claritin) 10 mg tablet Take 1 Tab by mouth daily as needed for Allergies. 11/23/20  Yes Jude Leonard MD   lisinopril-hydroCHLOROthiazide (PRINZIDE, ZESTORETIC) 20-25 mg per tablet TAKE 1 TABLET BY MOUTH DAILY 3/2/20  Yes Anny Richards PA   cholecalciferol (D3-2000) (2,000 UNITS /50 MCG) cap capsule TAKE 1 CAPSULE BY MOUTH DAILY 3/2/20  Yes Anny Richards PA   loratadine (Claritin) 10 mg tablet Take 1 Tab by mouth daily as needed for Allergies. 3/17/20 11/23/20  Adnrea Krishnan NP   diclofenac (VOLTAREN) 1 % gel Apply 4 g to affected area two (2) times a day. 10/23/19   Monie Esparza MD   ibuprofen (MOTRIN) 800 mg tablet Take 1 Tab by mouth every eight (8) hours as needed for Pain.  8/9/19   CHRISSY Dixon     Patient Active Problem List Diagnosis Date Noted    Prediabetes 10/29/2018    Morbid obesity with BMI of 45.0-49.9, adult ([facility] [address] 10/29/2018    Essential hypertension 03/01/2018     Current Outpatient Medications   Medication Sig Dispense Refill    loratadine (Claritin) 10 mg tablet Take 1 Tab by mouth daily as needed for Allergies. 90 Tab 1    lisinopril-hydroCHLOROthiazide (PRINZIDE, ZESTORETIC) 20-25 mg per tablet TAKE 1 TABLET BY MOUTH DAILY 90 Tab 1    cholecalciferol (D3-2000) (2,000 UNITS /50 MCG) cap capsule TAKE 1 CAPSULE BY MOUTH DAILY 30 Cap 5    diclofenac (VOLTAREN) 1 % gel Apply 4 g to affected area two (2) times a day. 100 g 1    ibuprofen (MOTRIN) 800 mg tablet Take 1 Tab by mouth every eight (8) hours as needed for Pain. 80 Tab 1     No Known Allergies  Past Medical History:   Diagnosis Date    Acid reflux     Acute cystitis with hematuria     Heel spur     HTN (hypertension), benign 12/2/2009    Menorrhagia     Plantar fasciitis     Shoulder bursitis     Urticaria     Vulvovaginitis      Social History     Tobacco Use    Smoking status: Never Smoker    Smokeless tobacco: Never Used   Substance Use Topics    Alcohol use: Yes     Comment: rare       ROS: See HPI    Objective:     Patient-Reported Vitals 8/10/2020   Patient-Reported LMP march 2020      General: alert, cooperative, no distress   Mental  status: normal mood, behavior, speech, dress, motor activity, and thought processes, able to follow commands   HENT: NCAT   Neck: no visualized mass   Resp: no respiratory distress   Neuro: no gross deficits   Skin: no discoloration or lesions of concern on visible areas   Psychiatric: normal affect, consistent with stated mood, no evidence of hallucinations     Additional exam findings: We discussed the expected course, resolution and complications of the diagnosis(es) in detail. Medication risks, benefits, costs, interactions, and alternatives were discussed as indicated.   I advised her to contact the office if her condition worsens, changes or fails to improve as anticipated. She expressed understanding with the diagnosis(es) and plan. Stephon Goodson, who was evaluated through a patient-initiated, synchronous (real-time) audio-video encounter, and/or her healthcare decision maker, is aware that it is a billable service, with coverage as determined by her insurance carrier. She provided verbal consent to proceed: Yes, and patient identification was verified. It was conducted pursuant to the emergency declaration under the [address] [place] [address] authority and the [employer] General Act. A caregiver was present when appropriate. Ability to conduct physical exam was limited. I was in the office. The patient was at home.       Abidaziz Michael MD

## 2020-11-23 NOTE — TELEPHONE ENCOUNTER
Gave the pt the information that Dr Grecia Mcarthur recommended and she understood and states that she will try the recommendations first. Closing encounter.

## 2020-12-04 NOTE — PROGRESS NOTES
Contacted patient and verified identity using name and date of birth (2- identifiers)  Spoke with patient and she verbalized understanding of no signs of UTI on UA or culture. No treatment at this time. All remaining labs are with out significant abnormality.  Further discussion at follow-up What Type Of Note Output Would You Prefer (Optional)?: Standard Output How Severe Are Your Spot(S)?: mild Have Your Spot(S) Been Treated In The Past?: has not been treated Hpi Title: Evaluation of Skin Lesions Additional History: Patient would like large red lesion evaluated

## 2020-12-09 ENCOUNTER — VIRTUAL VISIT (OUTPATIENT)
Dept: FAMILY MEDICINE CLINIC | Age: 50
End: 2020-12-09
Payer: MEDICAID

## 2020-12-09 ENCOUNTER — TELEPHONE (OUTPATIENT)
Dept: FAMILY MEDICINE CLINIC | Age: 50
End: 2020-12-09

## 2020-12-09 DIAGNOSIS — R06.2 WHEEZING: ICD-10-CM

## 2020-12-09 DIAGNOSIS — R05.9 COUGH: ICD-10-CM

## 2020-12-09 DIAGNOSIS — R09.82 POST-NASAL DRIP: Primary | ICD-10-CM

## 2020-12-09 PROCEDURE — 99213 OFFICE O/P EST LOW 20 MIN: CPT | Performed by: FAMILY MEDICINE

## 2020-12-09 RX ORDER — ALBUTEROL SULFATE 90 UG/1
2 AEROSOL, METERED RESPIRATORY (INHALATION)
Qty: 1 INHALER | Refills: 1 | Status: SHIPPED | OUTPATIENT
Start: 2020-12-09 | End: 2021-06-23

## 2020-12-09 RX ORDER — MONTELUKAST SODIUM 10 MG/1
10 TABLET ORAL DAILY
Qty: 30 TAB | Refills: 1 | Status: SHIPPED | OUTPATIENT
Start: 2020-12-09 | End: 2022-05-18 | Stop reason: SDUPTHER

## 2020-12-09 NOTE — PROGRESS NOTES
Inna Warner is a 45 y.o. female who was seen by synchronous (real-time) audio-video technology on 12/9/2020 for Sinus Pain        Assessment & Plan:   Diagnoses and all orders for this visit:    Sinus congestion: Since last 2 weeks has been feeling postnasal drip, sinus congestion, dry cough and wheezing more in the morning. Has been taking Claritin and Flonase. Helping very little. No history of asthma. Does not smoke. Denies any pain over the sinus area on a direct pressure. Said sometimes the sinus drainage is light greenish color. No fever. Had a Covid infection. Got retested back on Saturday and it showed negative Covid test.  For now I have advised the symptomatic treatment. Adding Singulair. Taking albuterol round-the-clock for few days for wheezing and dry cough. Also continue Flonase and Claritin. Any worsening of symptoms advised her to call back to the office or go to urgent care or ER. She had no fever. No abdominal pain. No nausea vomiting. No urinary or bowel complaints    Post-nasal drip  -     montelukast (SINGULAIR) 10 mg tablet; Take 1 Tab by mouth daily. , Normal, Disp-30 Tab,R-1    Cough  -     albuterol (PROVENTIL HFA, VENTOLIN HFA, PROAIR HFA) 90 mcg/actuation inhaler; Take 2 Puffs by inhalation every six (6) hours as needed for Wheezing., Normal, Disp-1 Inhaler,R-1    Wheezing    Patient understood and agreed with the plan  70 had Pap smear done at [facility]. Will obtain the records. Nurse to advised to follow-up on GI referral regarding colonoscopy referral.  2  Subjective:     Done visit through doxy   she called with the concern of sinus congestion. Said she had a Covid infection. During the infection she started having postnasal drip. Currently taking Flonase and Claritin. Helping very little. No sore throat. No fever. She got retested for Covid back on Saturday and that was negative. She denies any chest congestion.   Had a wheezing mostly mainly in the morning. No shortness of breath or chest pain. No nausea vomiting or abdominal pain. No urinary or bowel complaint. Said cough is mainly dry. Nasal discharge sometimes has a greenish color. No pain over the sinus area on a direct pressure. No headache or dizziness. During the visit she was sitting comfortable and did not appear in any acute distress. No other concern at this time. Taking her other medications as prescribed. No history of asthma. Does not smoke. Prior to Admission medications    Medication Sig Start Date End Date Taking? Authorizing Provider   montelukast (SINGULAIR) 10 mg tablet Take 1 Tab by mouth daily. 12/9/20  Yes Aj Ramirez MD   albuterol (PROVENTIL HFA, VENTOLIN HFA, PROAIR HFA) 90 mcg/actuation inhaler Take 2 Puffs by inhalation every six (6) hours as needed for Wheezing. 12/9/20  Yes Aj Ramirez MD   loratadine (Claritin) 10 mg tablet Take 1 Tab by mouth daily as needed for Allergies. 11/23/20  Yes Aj Ramirez MD   fluticasone propionate (FLONASE) 50 mcg/actuation nasal spray 1 spray each nostril daily as needed for rhinits 11/23/20  Yes Aj Rmairez MD   lisinopril-hydroCHLOROthiazide (PRINZIDE, ZESTORETIC) 20-25 mg per tablet TAKE 1 TABLET BY MOUTH DAILY 3/2/20  Yes Anny Richards PA   cholecalciferol (D3-2000) (2,000 UNITS /50 MCG) cap capsule TAKE 1 CAPSULE BY MOUTH DAILY 3/2/20  Yes Anny Richards PA   ibuprofen (MOTRIN) 800 mg tablet Take 1 Tab by mouth every eight (8) hours as needed for Pain. 8/9/19  Yes Anny Richards PA   albuterol (PROVENTIL HFA, VENTOLIN HFA, PROAIR HFA) 90 mcg/actuation inhaler Take 2 Puffs by inhalation every six (6) hours as needed for Wheezing. 11/23/20 12/9/20  Aj Ramirez MD   diclofenac (VOLTAREN) 1 % gel Apply 4 g to affected area two (2) times a day.  10/23/19   Richard Elaine MD         ROS: See HPI    Objective:     Patient-Reported Vitals 8/10/2020   Patient-Reported LMP march 2020      General: alert, cooperative, no distress   Mental  status: normal mood, behavior, speech, dress, motor activity, and thought processes, able to follow commands   HENT:  See HPI   Neck: no visualized mass   Resp: no respiratory distress   Neuro: no gross deficits   Skin: no discoloration or lesions of concern on visible areas   Psychiatric: normal affect, consistent with stated mood, no evidence of hallucinations     Additional exam findings: We discussed the expected course, resolution and complications of the diagnosis(es) in detail. Medication risks, benefits, costs, interactions, and alternatives were discussed as indicated. I advised her to contact the office if her condition worsens, changes or fails to improve as anticipated. She expressed understanding with the diagnosis(es) and plan. Teofilo Freeman, who was evaluated through a patient-initiated, synchronous (real-time) audio-video encounter, and/or her healthcare decision maker, is aware that it is a billable service, with coverage as determined by her insurance carrier. She provided verbal consent to proceed: Yes, and patient identification was verified. It was conducted pursuant to the emergency declaration under the [address] [place] [address], [address] [place] [address] authority and the [employer] General Act. A caregiver was present when appropriate. Ability to conduct physical exam was limited. I was in the office. The patient was at home.       Yessenia De MD

## 2020-12-09 NOTE — TELEPHONE ENCOUNTER
Pt called stating she is still having sinus drainage and it's a greenish color. Pt states she also has a cough as well. Please advise.

## 2020-12-09 NOTE — TELEPHONE ENCOUNTER
Left a voice message asking patient to return call to [facility] to schedule a virtual appointment with Dr. Christoph Hu, [phone] press option [phone] for - availability for today, 12/09/2020 at 2:45PM.

## 2021-01-19 ENCOUNTER — VIRTUAL VISIT (OUTPATIENT)
Dept: FAMILY MEDICINE CLINIC | Age: 51
End: 2021-01-19
Payer: MEDICAID

## 2021-01-19 DIAGNOSIS — R73.01 IMPAIRED FASTING BLOOD SUGAR: ICD-10-CM

## 2021-01-19 DIAGNOSIS — Z12.31 ENCOUNTER FOR SCREENING MAMMOGRAM FOR MALIGNANT NEOPLASM OF BREAST: ICD-10-CM

## 2021-01-19 DIAGNOSIS — R73.01 IMPAIRED FASTING BLOOD SUGAR: Primary | ICD-10-CM

## 2021-01-19 DIAGNOSIS — I10 ESSENTIAL HYPERTENSION: ICD-10-CM

## 2021-01-19 DIAGNOSIS — Z12.11 COLON CANCER SCREENING: ICD-10-CM

## 2021-01-19 PROCEDURE — 99213 OFFICE O/P EST LOW 20 MIN: CPT | Performed by: FAMILY MEDICINE

## 2021-01-20 NOTE — PROGRESS NOTES
Nancy Nuñez is a 45 y.o. female who was seen by synchronous (real-time) audio-video technology on 1/19/2021 for Other (prediabetes) and Hypertension        Assessment & Plan:   Diagnoses and all orders for this visit:    Impaired fasting blood sugar: Repeat labs. Last A1c in prediabetic range around 6.2. Discussed lifestyle modification. Diet modification and importance of weight loss. We will follow-up after results  -     HEMOGLOBIN A1C WITH EAG; Future  -     METABOLIC PANEL, COMPREHENSIVE; Future    Colon cancer screening  -     REFERRAL TO GASTROENTEROLOGY    Encounter for screening mammogram for malignant neoplasm of breast  -     [facility] MAMMO BI SCREENING INCL CAD; Future    Essential hypertension: Asymptomatic. Taking medication with compliance. Not checking blood pressure at home. Limitation in virtual visit checking labs. Reviewed the blood pressure log and fairly stable blood pressure log last visit. Pt understood and agree with the plan   Review HM  Had done pap at [facility]. [id number]  Subjective:     Done visit through [facility]. Patient for follow-up    History of prediabetes. No hypo or hyperglycemic symptoms. Last A1c in prediabetic range around 6.2. She was sitting comfortable and did not appear in any acute distress. Denies any headache, dizziness, no chest pain or trouble breathing, no arm or leg weakness. No nausea or vomiting, no weight or appetite changes, no mood changes . No urine or bowel complains, no palpitation, no diaphoresis. No abdominal pain. No cold or cough. No leg swelling. No fever. No sleep trouble. History of hypertension. Not checking blood pressure at home. Asymptomatic and being compliant with taking medications. Limitation in virtual visit to check for vitals. Discussed the health maintenance  Ordered the mammogram.  Also sending for colon cancer screening. She had a Pap smear done at [facility]. Will obtain the records. Review prior labs.   Lab Results   Component Value Date/Time    WBC 7.7 08/12/2020 09:55 AM    HGB 13.4 08/12/2020 09:55 AM    HCT 43.6 08/12/2020 09:55 AM    PLATELET 353 21/72/1756 09:55 AM    MCV 91 08/12/2020 09:55 AM     Lab Results   Component Value Date/Time    Sodium 139 08/12/2020 09:55 AM    Potassium 4.6 08/12/2020 09:55 AM    Chloride 99 08/12/2020 09:55 AM    CO2 29 08/12/2020 09:55 AM    Anion gap 11.0 08/12/2020 09:55 AM    Glucose 103 (H) 08/12/2020 09:55 AM    BUN 17 08/12/2020 09:55 AM    Creatinine 0.7 08/12/2020 09:55 AM    BUN/Creatinine ratio 14 04/16/2015 01:43 AM    GFR est AA >60 04/16/2015 01:43 AM    GFR est non-AA >60 04/16/2015 01:43 AM    Calcium 9.5 08/12/2020 09:55 AM    Bilirubin, total 0.2 05/21/2020 08:24 AM    Alk. phosphatase 70 05/21/2020 08:24 AM    Protein, total 7.4 05/21/2020 08:24 AM    Albumin 3.9 05/21/2020 08:24 AM    Globulin 3.5 05/21/2020 08:24 AM    A-G Ratio 1.1 05/21/2020 08:24 AM    ALT (SGPT) 28 05/21/2020 08:24 AM    AST (SGOT) 28 05/21/2020 08:24 AM     Lab Results   Component Value Date/Time    Cholesterol, total 137 05/21/2020 08:24 AM    HDL Cholesterol 44 05/21/2020 08:24 AM    LDL, calculated 79 05/21/2020 08:24 AM    VLDL, calculated 14 05/21/2020 08:24 AM    Triglyceride 68 05/21/2020 08:24 AM    CHOL/HDL Ratio 2.7 06/15/2010 12:20 PM     Lab Results   Component Value Date/Time    TSH 2.51 07/25/2018 10:11 AM     Lab Results   Component Value Date/Time    Hemoglobin A1c 6.2 (H) 05/21/2020 08:24 AM    Hemoglobin A1c (POC) 6.0 12/20/2019 03:17 PM     Lab Results   Component Value Date/Time    VITAMIN D, 25-HYDROXY 26.6 (L) 11/13/2018 08:15 AM           Prior to Admission medications    Medication Sig Start Date End Date Taking? Authorizing Provider   montelukast (SINGULAIR) 10 mg tablet Take 1 Tab by mouth daily.  12/9/20  Yes Willa Mcdaniel MD   fluticasone propionate (FLONASE) 50 mcg/actuation nasal spray 1 spray each nostril daily as needed for rhinits 11/23/20  Yes Ian Arnold Sole Peacock MD   lisinopril-hydroCHLOROthiazide (PRINZIDE, ZESTORETIC) 20-25 mg per tablet TAKE 1 TABLET BY MOUTH DAILY 3/2/20  Yes Anny Richards PA   cholecalciferol (D3-2000) (2,000 UNITS /50 MCG) cap capsule TAKE 1 CAPSULE BY MOUTH DAILY 3/2/20  Yes Anny Richards PA   albuterol (PROVENTIL HFA, VENTOLIN HFA, PROAIR HFA) 90 mcg/actuation inhaler Take 2 Puffs by inhalation every six (6) hours as needed for Wheezing. 12/9/20   Live Conn MD   loratadine (Claritin) 10 mg tablet Take 1 Tab by mouth daily as needed for Allergies. 11/23/20   Live Conn MD   diclofenac (VOLTAREN) 1 % gel Apply 4 g to affected area two (2) times a day. 10/23/19   Beth Thomas MD   ibuprofen (MOTRIN) 800 mg tablet Take 1 Tab by mouth every eight (8) hours as needed for Pain. 8/9/19   CHRISSY Webber     Patient Active Problem List    Diagnosis Date Noted    Prediabetes 10/29/2018    Morbid obesity with BMI of 45.0-49.9, adult ([facility] [address] 10/29/2018    Essential hypertension 03/01/2018     Current Outpatient Medications   Medication Sig Dispense Refill    montelukast (SINGULAIR) 10 mg tablet Take 1 Tab by mouth daily. 30 Tab 1    fluticasone propionate (FLONASE) 50 mcg/actuation nasal spray 1 spray each nostril daily as needed for rhinits 1 Bottle 1    lisinopril-hydroCHLOROthiazide (PRINZIDE, ZESTORETIC) 20-25 mg per tablet TAKE 1 TABLET BY MOUTH DAILY 90 Tab 1    cholecalciferol (D3-2000) (2,000 UNITS /50 MCG) cap capsule TAKE 1 CAPSULE BY MOUTH DAILY 30 Cap 5    albuterol (PROVENTIL HFA, VENTOLIN HFA, PROAIR HFA) 90 mcg/actuation inhaler Take 2 Puffs by inhalation every six (6) hours as needed for Wheezing. 1 Inhaler 1    loratadine (Claritin) 10 mg tablet Take 1 Tab by mouth daily as needed for Allergies. 90 Tab 1    diclofenac (VOLTAREN) 1 % gel Apply 4 g to affected area two (2) times a day. 100 g 1    ibuprofen (MOTRIN) 800 mg tablet Take 1 Tab by mouth every eight (8) hours as needed for Pain. 80 Tab 1     No Known Allergies  Past Medical History:   Diagnosis Date    Acid reflux     Acute cystitis with hematuria     Heel spur     HTN (hypertension), benign 12/2/2009    Menorrhagia     Plantar fasciitis     Shoulder bursitis     Urticaria     Vulvovaginitis      Social History     Tobacco Use    Smoking status: Never Smoker    Smokeless tobacco: Never Used   Substance Use Topics    Alcohol use: Yes     Comment: rare       ROS: see HPI     Objective:     Patient-Reported Vitals 8/10/2020   Patient-Reported LMP march 2020      General: alert, cooperative, no distress   Mental  status: normal mood, behavior, speech, dress, motor activity, and thought processes, able to follow commands   HENT: NCAT   Neck: no visualized mass   Resp: no respiratory distress   Neuro: no gross deficits   Skin: no discoloration or lesions of concern on visible areas   Psychiatric: normal affect, consistent with stated mood, no evidence of hallucinations     Additional exam findings: We discussed the expected course, resolution and complications of the diagnosis(es) in detail. Medication risks, benefits, costs, interactions, and alternatives were discussed as indicated. I advised her to contact the office if her condition worsens, changes or fails to improve as anticipated. She expressed understanding with the diagnosis(es) and plan. Lexx Tam, who was evaluated through a patient-initiated, synchronous (real-time) audio-video encounter, and/or her healthcare decision maker, is aware that it is a billable service, with coverage as determined by her insurance carrier. She provided verbal consent to proceed: Yes, and patient identification was verified. It was conducted pursuant to the emergency declaration under the [facility], [address] [place] [address] authority and the [employer] General Act.  A caregiver was present when appropriate. Ability to conduct physical exam was limited. I was in the office. The patient was at home.       Perry Valdez MD

## 2021-02-02 ENCOUNTER — VIRTUAL VISIT (OUTPATIENT)
Dept: FAMILY MEDICINE CLINIC | Age: 51
End: 2021-02-02
Payer: MEDICAID

## 2021-02-02 DIAGNOSIS — K59.00 CONSTIPATION, UNSPECIFIED CONSTIPATION TYPE: ICD-10-CM

## 2021-02-02 DIAGNOSIS — K13.79 MOUTH SORE: Primary | ICD-10-CM

## 2021-02-02 DIAGNOSIS — K21.9 GASTROESOPHAGEAL REFLUX DISEASE WITHOUT ESOPHAGITIS: ICD-10-CM

## 2021-02-02 PROCEDURE — 99213 OFFICE O/P EST LOW 20 MIN: CPT | Performed by: FAMILY MEDICINE

## 2021-02-02 RX ORDER — BISMUTH SUBSALICYLATE 262 MG
1 TABLET,CHEWABLE ORAL DAILY
COMMUNITY

## 2021-02-02 RX ORDER — FAMOTIDINE 20 MG/1
20 TABLET, FILM COATED ORAL 2 TIMES DAILY
Qty: 60 TAB | Refills: 1 | Status: SHIPPED | OUTPATIENT
Start: 2021-02-02 | End: 2022-08-02 | Stop reason: ALTCHOICE

## 2021-02-02 NOTE — PROGRESS NOTES
Trinity Rosales is a 45 y.o. female who was seen by synchronous (real-time) audio-video technology on 2/2/2021 for Mouth Lesions, Constipation, and GERD        Assessment & Plan:   Diagnoses and all orders for this visit:    Mouth sore: Over the palate. Soreness and itching. Patient gets it recurrently. Said some time eating certain foods and certain food brings them on. Has taken Benadryl and had a consolidation in the past and asking to reorder. Limited view on video visit but able to see sore on the top of the palate. She does have some constipation which I have advised her to drink lots of water, ambulation and Metamucil at nighttime. She is on multivitamin. If no improvement in person visit in a week  -     RX AMB LIDOCAINE 2% DIPHENHYDRAMINE ORAL COMPOUND; Take 5 mL by mouth nightly for 5 days, THEN 2.5 mL nightly for 2 days. lidocaine viscous 2% solution 20 mL  diphenhydrAMINE (BENADRYL) elixir 100 mL    Swish and swallow, Print, Disp-25 mL, R-0    Gastroesophageal reflux disease without esophagitis: Has been feeling on and off acid reflux. No nausea vomiting or any abdominal pain. No appetite or weight changes. For now we will give trial of Pepcid. Avoid any fried and fatty food. -     famotidine (PEPCID) 20 mg tablet; Take 1 Tab by mouth two (2) times a day., Normal, Disp-60 Tab, R-1  Constipation: see above. Last bowel movement today. Requested again with the nurse records for Pap  From [facility]  She is scheduled for colonoscopy consultation  712  Subjective:   Done visit through doxy  Here with the concern of acid reflux. Going on since few days. On and off. No abdominal pain. No nausea or vomiting. Mainly after eating food. Advised to have a diet modification and avoid spicy and fried food. No appetite or weight changes. No chest pain or shortness of breath. No cold cough wheezing or any fever. No urinary complaint.   She does have constipation on and off which I have advised symptomatic treatment and Metamucil over-the-counter. No known thyroid problem. Complain of having a mild sore over the palate area. Itching and pain. occurs after certain food. She had those in the past.  Asking for Benadryl and lidocaine compound solution as it has been bothering her enough to eat food. During the virtual visit sitting comfortable and did not appear in any acute distress  Denies any rash like mild thrush. Does have a prediabetes. Again discussed the importance of lifestyle modification and weight loss    Prior to Admission medications    Medication Sig Start Date End Date Taking? Authorizing Provider   RX AMB LIDOCAINE 2% DIPHENHYDRAMINE ORAL COMPOUND Take 5 mL by mouth nightly for 5 days, THEN 2.5 mL nightly for 2 days. lidocaine viscous 2% solution 20 mL  diphenhydrAMINE (BENADRYL) elixir 100 mL    Swish and swallow 2/2/21 2/9/21 Yes Kushal Milton MD   multivitamin (ONE A DAY) tablet Take 1 Tab by mouth daily. Yes Provider, Historical   famotidine (PEPCID) 20 mg tablet Take 1 Tab by mouth two (2) times a day. 2/2/21  Yes Kushal Milton MD   montelukast (SINGULAIR) 10 mg tablet Take 1 Tab by mouth daily. 12/9/20  Yes Kushal Milton MD   loratadine (Claritin) 10 mg tablet Take 1 Tab by mouth daily as needed for Allergies. 11/23/20  Yes Kushal Milton MD   fluticasone propionate (FLONASE) 50 mcg/actuation nasal spray 1 spray each nostril daily as needed for rhinits 11/23/20  Yes Kushal Milton MD   lisinopril-hydroCHLOROthiazide (PRINZIDE, ZESTORETIC) 20-25 mg per tablet TAKE 1 TABLET BY MOUTH DAILY 3/2/20  Yes Anny Richards PA   cholecalciferol (D3-2000) (2,000 UNITS /50 MCG) cap capsule TAKE 1 CAPSULE BY MOUTH DAILY 3/2/20  Yes Anny Richards PA   albuterol (PROVENTIL HFA, VENTOLIN HFA, PROAIR HFA) 90 mcg/actuation inhaler Take 2 Puffs by inhalation every six (6) hours as needed for Wheezing.  12/9/20   Kushal Milton MD   diclofenac (VOLTAREN) 1 % gel Apply 4 g to affected area two (2) times a day. 10/23/19   Janee Padilla MD   ibuprofen (MOTRIN) 800 mg tablet Take 1 Tab by mouth every eight (8) hours as needed for Pain. 8/9/19   CHRISSY Mccord         ROS: See HPI    Objective:     Patient-Reported Vitals 8/10/2020   Patient-Reported LMP march 2020      General: alert, cooperative, no distress   Mental  status: normal mood, behavior, speech, dress, motor activity, and thought processes, able to follow commands   HENT:  Mouth: Limited view but noted some lesion over the palate is patient was able to get the camera close enough. Flat peeling of skin. No redness or any discharge noted. Neck: no visualized mass   Resp: no respiratory distress   Neuro: no gross deficits   Skin: no discoloration or lesions of concern on visible areas   Psychiatric: normal affect, consistent with stated mood, no evidence of hallucinations     Additional exam findings: We discussed the expected course, resolution and complications of the diagnosis(es) in detail. Medication risks, benefits, costs, interactions, and alternatives were discussed as indicated. I advised her to contact the office if her condition worsens, changes or fails to improve as anticipated. She expressed understanding with the diagnosis(es) and plan. Daniel Sandoval, who was evaluated through a patient-initiated, synchronous (real-time) audio-video encounter, and/or her healthcare decision maker, is aware that it is a billable service, with coverage as determined by her insurance carrier. She provided verbal consent to proceed: Yes, and patient identification was verified. It was conducted pursuant to the emergency declaration under the [address] [place] [address] authority and the [employer] and [employer] General Act. A caregiver was present when appropriate. Ability to conduct physical exam was limited. I was at home.  The patient was at home.       Baby MD Malathi

## 2021-02-10 ENCOUNTER — HOSPITAL ENCOUNTER (OUTPATIENT)
Dept: LAB | Age: 51
Discharge: HOME | End: 2021-02-10

## 2021-02-10 LAB — SENTARA SPECIMEN COL,SENBCF: NORMAL

## 2021-02-10 PROCEDURE — 99001 SPECIMEN HANDLING PT-LAB: CPT

## 2021-02-11 LAB
A-G RATIO,AGRAT: 1.1 RATIO (ref 1.1–2.6)
ALBUMIN SERPL-MCNC: 4 G/DL (ref 3.5–5)
ALP SERPL-CCNC: 82 U/L (ref 25–115)
ALT SERPL-CCNC: 22 U/L (ref 5–40)
ANION GAP SERPL CALC-SCNC: 11 MMOL/L (ref 3–15)
AST SERPL W P-5'-P-CCNC: 18 U/L (ref 10–37)
AVG GLU, 10930: 121 MG/DL (ref 91–123)
BILIRUB SERPL-MCNC: 0.3 MG/DL (ref 0.2–1.2)
BUN SERPL-MCNC: 12 MG/DL (ref 6–22)
CALCIUM SERPL-MCNC: 9.8 MG/DL (ref 8.4–10.5)
CHLORIDE SERPL-SCNC: 97 MMOL/L (ref 98–110)
CO2 SERPL-SCNC: 32 MMOL/L (ref 20–32)
CREAT SERPL-MCNC: 0.8 MG/DL (ref 0.5–1.2)
GFRAA, 66117: >60
GFRNA, 66118: >60
GLOBULIN,GLOB: 3.7 G/DL (ref 2–4)
GLUCOSE SERPL-MCNC: 117 MG/DL (ref 70–99)
HBA1C MFR BLD HPLC: 5.8 % (ref 4.8–5.6)
POTASSIUM SERPL-SCNC: 3.6 MMOL/L (ref 3.5–5.5)
PROT SERPL-MCNC: 7.7 G/DL (ref 6.4–8.3)
SODIUM SERPL-SCNC: 140 MMOL/L (ref 133–145)

## 2021-02-12 ENCOUNTER — TELEPHONE (OUTPATIENT)
Dept: FAMILY MEDICINE CLINIC | Age: 51
End: 2021-02-12

## 2021-02-12 NOTE — PROGRESS NOTES
A1c improved compared to before. Now in prediabetic range. Other labs fairly stable.   Further discussion on follow-up visit

## 2021-02-15 NOTE — TELEPHONE ENCOUNTER
Contacted patient and verified identity using name and date of birth (2- identifiers)  Spoke with patient and she verbalized understanding of A1c improved compared to before. Now in prediabetic range. Other labs fairly stable.   Further discussion on follow-up visit

## 2021-02-19 DIAGNOSIS — I10 ESSENTIAL HYPERTENSION: ICD-10-CM

## 2021-02-19 RX ORDER — METOPROLOL TARTRATE 100 MG/1
100 TABLET ORAL DAILY
Qty: 90 TAB | Refills: 1 | Status: SHIPPED | OUTPATIENT
Start: 2021-02-19 | End: 2021-04-22 | Stop reason: SDUPTHER

## 2021-02-19 NOTE — TELEPHONE ENCOUNTER
This patient contacted office for the following prescriptions to be filled:    Medication requested :   Requested Prescriptions     Pending Prescriptions Disp Refills    metoprolol tartrate (LOPRESSOR) 100 mg IR tablet 90 Tab 1     Sig: Take 1 Tab by mouth daily for 180 days.        PCP: Ej Nielson or Print: [facility]   Mail order or Local pharmacy [address]     Scheduled appointment if not seen by current providers in office: [address] [place] [address] 2/2/2021 f/u 3/10/2021 pt is out of this medication

## 2021-04-22 ENCOUNTER — OFFICE VISIT (OUTPATIENT)
Dept: FAMILY MEDICINE CLINIC | Age: 51
End: 2021-04-22
Payer: MEDICAID

## 2021-04-22 VITALS
WEIGHT: 275 LBS | DIASTOLIC BLOOD PRESSURE: 84 MMHG | HEIGHT: 62 IN | TEMPERATURE: 98.2 F | HEART RATE: 70 BPM | RESPIRATION RATE: 16 BRPM | BODY MASS INDEX: 50.61 KG/M2 | OXYGEN SATURATION: 98 % | SYSTOLIC BLOOD PRESSURE: 126 MMHG

## 2021-04-22 DIAGNOSIS — R73.01 IMPAIRED FASTING BLOOD SUGAR: ICD-10-CM

## 2021-04-22 DIAGNOSIS — I10 ESSENTIAL HYPERTENSION: ICD-10-CM

## 2021-04-22 DIAGNOSIS — Z00.00 WELL WOMAN EXAM (NO GYNECOLOGICAL EXAM): Primary | ICD-10-CM

## 2021-04-22 PROCEDURE — 99396 PREV VISIT EST AGE 40-64: CPT | Performed by: FAMILY MEDICINE

## 2021-04-22 RX ORDER — LISINOPRIL AND HYDROCHLOROTHIAZIDE 20; 25 MG/1; MG/1
TABLET ORAL
Qty: 90 TAB | Refills: 1 | Status: SHIPPED | OUTPATIENT
Start: 2021-04-22 | End: 2022-04-28

## 2021-04-22 RX ORDER — ACETAMINOPHEN 500 MG
TABLET ORAL
Qty: 90 CAP | Refills: 4 | Status: SHIPPED | OUTPATIENT
Start: 2021-04-22 | End: 2022-05-03 | Stop reason: SDUPTHER

## 2021-04-22 RX ORDER — IBUPROFEN 800 MG/1
800 TABLET ORAL
Qty: 90 TAB | Refills: 1 | Status: SHIPPED | OUTPATIENT
Start: 2021-04-22 | End: 2021-11-30

## 2021-04-22 RX ORDER — METOPROLOL TARTRATE 100 MG/1
100 TABLET ORAL DAILY
Qty: 90 TAB | Refills: 1 | Status: SHIPPED | OUTPATIENT
Start: 2021-04-22 | End: 2023-11-03

## 2021-04-22 NOTE — PROGRESS NOTES
Chief Complaint   Patient presents with    Well Woman     No pap    Hypertension       1. Have you been to the ER, urgent care clinic since your last visit? Hospitalized since your last visit? NO    2. Have you seen or consulted any other health care providers outside of the [address] [place] [address] since your last visit? Include any pap smears or colon screening.  No

## 2021-04-22 NOTE — PROGRESS NOTES
Subjective:   45 y.o. female for Well Woman Check. Her gyne and breast care is done elsewhere by her Ob-Gyne physician. At [facility]. Has scheduled pap smear and clinical breast exam next month. Has colonoscopy scheduled in May  Sexually active. No concern with menstrual cycle. No pelvic pain or vaginal discharge. No prior abnormal pap smear. No family history of breast cancer, cervical cancer, ovarian cancer or colon cancer. Lab Results   Component Value Date/Time    WBC 7.7 08/12/2020 09:55 AM    HGB 13.4 08/12/2020 09:55 AM    HCT 43.6 08/12/2020 09:55 AM    PLATELET 867 87/24/6466 09:55 AM    MCV 91 08/12/2020 09:55 AM     Lab Results   Component Value Date/Time    Sodium 140 02/10/2021 08:30 AM    Potassium 3.6 02/10/2021 08:30 AM    Chloride 97 (L) 02/10/2021 08:30 AM    CO2 32 02/10/2021 08:30 AM    Anion gap 11.0 02/10/2021 08:30 AM    Glucose 117 (H) 02/10/2021 08:30 AM    BUN 12 02/10/2021 08:30 AM    Creatinine 0.8 02/10/2021 08:30 AM    BUN/Creatinine ratio 14 04/16/2015 01:43 AM    GFR est AA >60 04/16/2015 01:43 AM    GFR est non-AA >60 04/16/2015 01:43 AM    Calcium 9.8 02/10/2021 08:30 AM    Bilirubin, total 0.3 02/10/2021 08:30 AM    Alk.  phosphatase 82 02/10/2021 08:30 AM    Protein, total 7.7 02/10/2021 08:30 AM    Albumin 4.0 02/10/2021 08:30 AM    Globulin 3.7 02/10/2021 08:30 AM    A-G Ratio 1.1 02/10/2021 08:30 AM    ALT (SGPT) 22 02/10/2021 08:30 AM    AST (SGOT) 18 02/10/2021 08:30 AM     Lab Results   Component Value Date/Time    Cholesterol, total 137 05/21/2020 08:24 AM    HDL Cholesterol 44 05/21/2020 08:24 AM    LDL, calculated 79 05/21/2020 08:24 AM    VLDL, calculated 14 05/21/2020 08:24 AM    Triglyceride 68 05/21/2020 08:24 AM    CHOL/HDL Ratio 2.7 06/15/2010 12:20 PM     Lab Results   Component Value Date/Time    TSH 2.51 07/25/2018 10:11 AM     Lab Results   Component Value Date/Time    Hemoglobin A1c 5.8 (H) 02/10/2021 08:30 AM    Hemoglobin A1c (POC) 6.0 2019 03:17 PM     Lab Results   Component Value Date/Time    VITAMIN D, 25-HYDROXY 26.6 (L) 2018 08:15 AM           Patient Active Problem List    Diagnosis Date Noted    Prediabetes 10/29/2018    Morbid obesity with BMI of 45.0-49.9, adult ([facility] [address] 10/29/2018    Essential hypertension 2018     Current Outpatient Medications   Medication Sig Dispense Refill    ibuprofen (MOTRIN) 800 mg tablet Take 1 Tab by mouth every eight (8) hours as needed for Pain. 90 Tab 1    lisinopril-hydroCHLOROthiazide (PRINZIDE, ZESTORETIC) 20-25 mg per tablet TAKE 1 TABLET BY MOUTH DAILY 90 Tab 1    metoprolol tartrate (LOPRESSOR) 100 mg IR tablet Take 1 Tab by mouth daily for 180 days. 90 Tab 1    cholecalciferol (D3-2000) (2,000 UNITS /50 MCG) cap capsule TAKE 1 CAPSULE BY MOUTH DAILY 90 Cap 4    multivitamin (ONE A DAY) tablet Take 1 Tab by mouth daily.  famotidine (PEPCID) 20 mg tablet Take 1 Tab by mouth two (2) times a day. 60 Tab 1    montelukast (SINGULAIR) 10 mg tablet Take 1 Tab by mouth daily. 30 Tab 1    albuterol (PROVENTIL HFA, VENTOLIN HFA, PROAIR HFA) 90 mcg/actuation inhaler Take 2 Puffs by inhalation every six (6) hours as needed for Wheezing. 1 Inhaler 1    loratadine (Claritin) 10 mg tablet Take 1 Tab by mouth daily as needed for Allergies. 90 Tab 1    fluticasone propionate (FLONASE) 50 mcg/actuation nasal spray 1 spray each nostril daily as needed for rhinits 1 Bottle 1    diclofenac (VOLTAREN) 1 % gel Apply 4 g to affected area two (2) times a day.  100 g 1     No Known Allergies  Past Medical History:   Diagnosis Date    Acid reflux     Acute cystitis with hematuria     Heel spur     HTN (hypertension), benign 2009    Menorrhagia     Plantar fasciitis     Shoulder bursitis     Urticaria     Vulvovaginitis      Past Surgical History:   Procedure Laterality Date    HX CHOLECYSTECTOMY      HX GYN  2003        HX GYN  2005    BTL     Family History   Problem Relation Age of Onset    Cancer Mother     Diabetes Mother     Heart Failure Mother    [place] Arthritis-rheumatoid Mother     Stroke Father     Hypertension Father     Substance Abuse Father         tobacco use    Alzheimer Father     Cancer Brother     Diabetes Brother     Drug Abuse Brother     Alcohol abuse Maternal Grandfather     Attention Deficit Disorder Daughter     Heart Attack Maternal Aunt      Social History     Tobacco Use    Smoking status: Never Smoker    Smokeless tobacco: Never Used   Substance Use Topics    Alcohol use: Yes     Comment: rare            ROS: Feeling generally well. No TIA's or unusual headaches, no dysphagia. No prolonged cough. No dyspnea or chest pain on exertion. No abdominal pain, change in bowel habits, black or bloody stools. No urinary tract symptoms. No new or unusual musculoskeletal symptoms. Specific concerns today: none    Objective: The patient appears well, alert, oriented x 3, in no distress. Visit Vitals  /84 (BP 1 Location: Left arm, BP Patient Position: Sitting, BP Cuff Size: Large adult)   Pulse 70   Temp 98.2 °F (36.8 °C) (Oral)   Resp 16   Ht 5' 2\" (1.575 m)   Wt 275 lb (124.7 kg)   LMP 04/01/2020   SpO2 98%   BMI 50.30 kg/m²     ENT normal.  Neck supple. No adenopathy or thyromegaly. TYE. Lungs are clear, good air entry, no wheezes, rhonchi or rales. S1 and S2 normal, no murmurs, regular rate and rhythm. Abdomen soft without tenderness, guarding, mass or organomegaly. Extremities show no edema, normal peripheral pulses. Neurological is normal, no focal findings. Breast and Pelvic exams are deferred. Assessment/Plan:       ICD-10-CM ICD-9-CM    1. Well woman exam (no gynecological exam)  Z00.00 V70.0     [V70.0]   2. Essential hypertension : stable vitals. Given medication refill. I10 401.9 lisinopril-hydroCHLOROthiazide (PRINZIDE, ZESTORETIC) 20-25 mg per tablet      metoprolol tartrate (LOPRESSOR) 100 mg IR tablet   3.  Impaired fasting blood sugar : life style modification. She is working on weight loss. Lost 9 lbs since last visit. R73.01 790.21    Pt understand and agree with the plan   See HPI for HM  Not full month since covid second shot. Will give Tdap next visit or she will get it from the pharmacy. Follow-up and Dispositions    · Return in about 4 months (around 8/22/2021). Please note that this dictation was completed with [email or web address], the computer voice recognition software. Quite often unanticipated grammatical, syntax, homophones, and other interpretive errors are inadvertently transcribed by the computer software. Please disregard these errors. Please excuse any errors that have escaped final proofreading.

## 2021-04-22 NOTE — PATIENT INSTRUCTIONS
Well Visit, Ages 25 to 48: Care Instructions Overview Well visits can help you stay healthy. Your doctor has checked your overall health and may have suggested ways to take good care of yourself. Your doctor also may have recommended tests. At home, you can help prevent illness with healthy eating, regular exercise, and other steps. Follow-up care is a key part of your treatment and safety. Be sure to make and go to all appointments, and call your doctor if you are having problems. It's also a good idea to know your test results and keep a list of the medicines you take. How can you care for yourself at home? · Get screening tests that you and your doctor decide on. Screening helps find diseases before any symptoms appear. · Eat healthy foods. Choose fruits, vegetables, whole grains, protein, and low-fat dairy foods. Limit fat, especially saturated fat. Reduce salt in your diet. · Limit alcohol. If you are a man, have no more than 2 drinks a day or 14 drinks a week. If you are a woman, have no more than 1 drink a day or 7 drinks a week. · Get at least 30 minutes of physical activity on most days of the week. Walking is a good choice. You also may want to do other activities, such as running, swimming, cycling, or playing tennis or team sports. Discuss any changes in your exercise program with your doctor. · Reach and stay at a healthy weight. This will lower your risk for many problems, such as obesity, diabetes, heart disease, and high blood pressure. · Do not smoke or allow others to smoke around you. If you need help quitting, talk to your doctor about stop-smoking programs and medicines. These can increase your chances of quitting for good. · Care for your mental health. It is easy to get weighed down by worry and stress. Learn strategies to manage stress, like deep breathing and mindfulness, and stay connected with your family and community.  If you find you often feel sad or hopeless, talk with your doctor. Treatment can help. · Talk to your doctor about whether you have any risk factors for sexually transmitted infections (STIs). You can help prevent STIs if you wait to have sex with a new partner (or partners) until you've each been tested for STIs. It also helps if you use condoms (male or female condoms) and if you limit your sex partners to one person who only has sex with you. Vaccines are available for some STIs, such as HPV. · Use birth control if it's important to you to prevent pregnancy. Talk with your doctor about the choices available and what might be best for you. · If you think you may have a problem with alcohol or drug use, talk to your doctor. This includes prescription medicines (such as amphetamines and opioids) and illegal drugs (such as cocaine and methamphetamine). Your doctor can help you figure out what type of treatment is best for you. · Protect your skin from too much sun. When you're outdoors from 10 a.m. to 4 p.m., stay in the shade or cover up with clothing and a hat with a wide brim. Wear sunglasses that block UV rays. Even when it's cloudy, put broad-spectrum sunscreen (SPF 30 or higher) on any exposed skin. · See a dentist one or two times a year for checkups and to have your teeth cleaned. · Wear a seat belt in the car. When should you call for help? Watch closely for changes in your health, and be sure to contact your doctor if you have any problems or symptoms that concern you. Where can you learn more? Go to http://www.OutTrippin.com/ Enter P072 in the search box to learn more about \"Well Visit, Ages 25 to 48: Care Instructions. \" Current as of: May 27, 2020               Content Version: 12.8 © 6562-0823 Healthwise, Incorporated. Care instructions adapted under license by [employer] (which disclaims liability or warranty for this information).  If you have questions about a medical condition or this instruction, always ask your healthcare professional. [place] [address] any warranty or liability for your use of this information. Well Visit, Women 48 to 72: Care Instructions Overview Well visits can help you stay healthy. Your doctor has checked your overall health and may have suggested ways to take good care of yourself. Your doctor also may have recommended tests. At home, you can help prevent illness with healthy eating, regular exercise, and other steps. Follow-up care is a key part of your treatment and safety. Be sure to make and go to all appointments, and call your doctor if you are having problems. It's also a good idea to know your test results and keep a list of the medicines you take. How can you care for yourself at home? · Get screening tests that you and your doctor decide on. Screening helps find diseases before any symptoms appear. · Eat healthy foods. Choose fruits, vegetables, whole grains, protein, and low-fat dairy foods. Limit fat, especially saturated fat. Reduce salt in your diet. · Limit alcohol. Have no more than 1 drink a day or 7 drinks a week. · Get at least 30 minutes of exercise on most days of the week. Walking is a good choice. You also may want to do other activities, such as running, swimming, cycling, or playing tennis or team sports. · Reach and stay at a healthy weight. This will lower your risk for many problems, such as obesity, diabetes, heart disease, and high blood pressure. · Do not smoke. Smoking can make health problems worse. If you need help quitting, talk to your doctor about stop-smoking programs and medicines. These can increase your chances of quitting for good. · Care for your mental health. It is easy to get weighed down by worry and stress. Learn strategies to manage stress, like deep breathing and mindfulness, and stay connected with your family and community. If you find you often feel sad or hopeless, talk with your doctor.  Treatment can help. · Talk to your doctor about whether you have any risk factors for sexually transmitted infections (STIs). You can help prevent STIs if you wait to have sex with a new partner (or partners) until you've each been tested for STIs. It also helps if you use condoms (male or female condoms) and if you limit your sex partners to one person who only has sex with you. Vaccines are available for some STIs. · If you think you may have a problem with alcohol or drug use, talk to your doctor. This includes prescription medicines (such as amphetamines and opioids) and illegal drugs (such as cocaine and methamphetamine). Your doctor can help you figure out what type of treatment is best for you. · Protect your skin from too much sun. When you're outdoors from 10 a.m. to 4 p.m., stay in the shade or cover up with clothing and a hat with a wide brim. Wear sunglasses that block UV rays. Even when it's cloudy, put broad-spectrum sunscreen (SPF 30 or higher) on any exposed skin. · See a dentist one or two times a year for checkups and to have your teeth cleaned. · Wear a seat belt in the car. When should you call for help? Watch closely for changes in your health, and be sure to contact your doctor if you have any problems or symptoms that concern you. Where can you learn more? Go to http://www.gray.com/ Enter L000 in the search box to learn more about \"Well Visit, Women 50 to 72: Care Instructions. \" Current as of: May 27, 2020               Content Version: 12.8 © 2006-2021 Healthwise, Incorporated. Care instructions adapted under license by [employer] (which disclaims liability or warranty for this information). If you have questions about a medical condition or this instruction, always ask your healthcare professional. [address] any warranty or liability for your use of this information. Breast Self-Exam: Care Instructions Your Care Instructions A breast self-exam is when you check your breasts for lumps or changes. This regular exam helps you learn how your breasts normally look and feel. Most breast problems or changes are not because of cancer. Breast self-exam is not a substitute for a mammogram. Having regular breast exams by your doctor and regular mammograms improve your chances of finding any problems with your breasts. Some women set a time each month to do a step-by-step breast self-exam. Other women like a less formal system. They might look at their breasts as they brush their teeth, or feel their breasts once in a while in the shower. If you notice a change in your breast, tell your doctor. Follow-up care is a key part of your treatment and safety. Be sure to make and go to all appointments, and call your doctor if you are having problems. It's also a good idea to know your test results and keep a list of the medicines you take. How do you do a breast self-exam? 
· The best time to examine your breasts is usually one week after your menstrual period begins. Your breasts should not be tender then. If you do not have periods, you might do your exam on a day of the month that is easy to remember. · To examine your breasts: ? Remove all your clothes above the waist and lie down. When you are lying down, your breast tissue spreads evenly over your chest wall, which makes it easier to feel all your breast tissue. ? Use the padsnot the fingertipsof the 3 middle fingers of your left hand to check your right breast. Move your fingers slowly in small coin-sized circles that overlap. ? Use three levels of pressure to feel of all your breast tissue. Use light pressure to feel the tissue close to the skin surface. Use medium pressure to feel a little deeper. Use firm pressure to feel your tissue close to your breastbone and ribs. Use each pressure level to feel your breast tissue before moving on to the next spot. ?  Check your entire breast, moving up and down as if following a strip from the collarbone to the bra line, and from the armpit to the ribs. Repeat until you have covered the entire breast. 
? Repeat this procedure for your left breast, using the pads of the 3 middle fingers of your right hand. · To examine your breasts while in the shower: 
? Place one arm over your head and lightly soap your breast on that side. ? Using the pads of your fingers, gently move your hand over your breast (in the strip pattern described above), feeling carefully for any lumps or changes. ? Repeat for the other breast. 
· Have your doctor inspect anything you notice to see if you need further testing. Where can you learn more? Go to http://www.gray.com/ Enter P148 in the search box to learn more about \"Breast Self-Exam: Care Instructions. \" Current as of: December 17, 2020               Content Version: 12.8 © 2006-2021 [employer]. Care instructions adapted under license by [employer] (which disclaims liability or warranty for this information). If you have questions about a medical condition or this instruction, always ask your healthcare professional. [place] [address] any warranty or liability for your use of this information. A Healthy Lifestyle: Care Instructions Your Care Instructions A healthy lifestyle can help you feel good, stay at a healthy weight, and have plenty of energy for both work and play. A healthy lifestyle is something you can share with your whole family. A healthy lifestyle also can lower your risk for serious health problems, such as high blood pressure, heart disease, and diabetes. You can follow a few steps listed below to improve your health and the health of your family. Follow-up care is a key part of your treatment and safety. Be sure to make and go to all appointments, and call your doctor if you are having problems.  It's also a good idea to know your test results and keep a list of the medicines you take. How can you care for yourself at home? · Do not eat too much sugar, fat, or fast foods. You can still have dessert and treats now and then. The goal is moderation. · Start small to improve your eating habits. Pay attention to portion sizes, drink less juice and soda pop, and eat more fruits and vegetables. ? Eat a healthy amount of food. A 3-ounce serving of meat, for example, is about the size of a deck of cards. Fill the rest of your plate with vegetables and whole grains. ? Limit the amount of soda and sports drinks you have every day. Drink more water when you are thirsty. ? Eat plenty of fruits and vegetables every day. Have an apple or some carrot sticks as an afternoon snack instead of a candy bar. Try to have fruits and/or vegetables at every meal. 
· Make exercise part of your daily routine. You may want to start with simple activities, such as walking, bicycling, or slow swimming. Try to be active 30 to 60 minutes every day. You do not need to do all 30 to 60 minutes all at once. For example, you can exercise 3 times a day for 10 or 20 minutes. Moderate exercise is safe for most people, but it is always a good idea to talk to your doctor before starting an exercise program. 
· Keep moving. Constantin Eric the lawn, work in the garden, or [employer]. Take the stairs instead of the elevator at work. · If you smoke, quit. People who smoke have an increased risk for heart attack, stroke, cancer, and other lung illnesses. Quitting is hard, but there are ways to boost your chance of quitting tobacco for good. ? Use nicotine gum, patches, or lozenges. ? Ask your doctor about stop-smoking programs and medicines. ? Keep trying.  
In addition to reducing your risk of diseases in the future, you will notice some benefits soon after you stop using tobacco. If you have shortness of breath or asthma symptoms, they will likely get better within a few weeks after you quit. · Limit how much alcohol you drink. Moderate amounts of alcohol (up to 2 drinks a day for men, 1 drink a day for women) are okay. But drinking too much can lead to liver problems, high blood pressure, and other health problems. Family health If you have a family, there are many things you can do together to improve your health. · Eat meals together as a family as often as possible. · Eat healthy foods. This includes fruits, vegetables, lean meats and dairy, and whole grains. · Include your family in your fitness plan. Most people think of activities such as jogging or tennis as the way to fitness, but there are many ways you and your family can be more active. Anything that makes you breathe hard and gets your heart pumping is exercise. Here are some tips: 
? Walk to do errands or to take your child to school or the bus. 
? Go for a family bike ride after dinner instead of watching TV. Where can you learn more? Go to http://www.gray.com/ Enter R956 in the search box to learn more about \"A Healthy Lifestyle: Care Instructions. \" Current as of: September 23, 2020               Content Version: 12.8 © 6213-5692 [employer]. Care instructions adapted under license by [employer] (which disclaims liability or warranty for this information). If you have questions about a medical condition or this instruction, always ask your healthcare professional. [place] [address] any warranty or liability for your use of this information. Prediabetes: Care Instructions Overview Prediabetes is a warning sign that you're at risk for getting type 2 diabetes. It means that your blood sugar is higher than it should be. But it's not high enough to be diabetes. The food you eat naturally turns into sugar. Your body uses the sugar for energy. Normally, an organ called the pancreas makes insulin.  And insulin allows the sugar in your blood to get into your body's cells. But sometimes the body can't use insulin the right way. So the sugar stays in your blood instead. This is called insulin resistance. The buildup of sugar in your blood means you have prediabetes. The good news is that you may be able to prevent or delay diabetes. Making small lifestyle changes, like getting active and changing your eating habits, may help you get your blood sugar back to normal. You can work with your doctor to make a treatment plan. Follow-up care is a key part of your treatment and safety. Be sure to make and go to all appointments, and call your doctor if you are having problems. It's also a good idea to know your test results and keep a list of the medicines you take. How can you care for yourself at home? · Watch your weight. A healthy weight helps your body use insulin properly. · Limit the amount of calories, sweets, and unhealthy fat you eat. Ask your doctor if you should see a dietitian. A registered dietitian can help you create meal plans that fit your lifestyle. · Get at least 30 minutes of exercise on most days of the week. Exercise helps control your blood sugar. It also helps you maintain a healthy weight. Walking is a good choice. You also may want to do other activities, such as running, swimming, cycling, or playing tennis or team sports. · Do not smoke. Smoking can make prediabetes worse. If you need help quitting, talk to your doctor about stop-smoking programs and medicines. These can increase your chances of quitting for good. · If your doctor prescribed medicines, take them exactly as prescribed. Call your doctor if you think you are having a problem with your medicine. You will get more details on the specific medicines your doctor prescribes. When should you call for help? Watch closely for changes in your health, and be sure to contact your doctor if: 
  · You have any symptoms of diabetes. These may include: 
? Being thirsty more often. ? Urinating more. ? Being hungrier. ? Losing weight. ? Being very tired. ? Having blurry vision.  
  · You have a wound that will not heal.  
  · You have an infection that will not go away.  
  · You have problems with your blood pressure.  
  · You want more information about diabetes and how you can keep from getting it. Where can you learn more? Go to http://www.gray.com/ Enter I222 in the search box to learn more about \"Prediabetes: Care Instructions. \" Current as of: August 31, 2020               Content Version: 12.8 © 6932-8190 [employer]. Care instructions adapted under license by [employer] (which disclaims liability or warranty for this information). If you have questions about a medical condition or this instruction, always ask your healthcare professional. [address] any warranty or liability for your use of this information. Low Sodium Diet (2,000 Milligram): Care Instructions Overview Limiting sodium can be an important part of managing some health problems. The most common source of sodium is salt. People get most of the salt in their diet from canned, prepared, and packaged foods. Fast food and restaurant meals also are very high in sodium. Your doctor will probably limit your sodium to less than 2,000 milligrams (mg) a day. This limit counts all the sodium in prepared and packaged foods and any salt you add to your food. Follow-up care is a key part of your treatment and safety. Be sure to make and go to all appointments, and call your doctor if you are having problems. It's also a good idea to know your test results and keep a list of the medicines you take. How can you care for yourself at home? Read food labels · Read labels on cans and food packages. The labels tell you how much sodium is in each serving. Make sure that you look at the serving size.  If you eat more than the serving size, you have eaten more sodium. · Food labels also tell you the Percent Daily Value for sodium. Choose products with low Percent Daily Values for sodium. · Be aware that sodium can come in forms other than salt, including monosodium glutamate (MSG), sodium citrate, and sodium bicarbonate (baking soda). MSG is often added to Asian food. When you eat out, you can sometimes ask for food without MSG or added salt. Buy low-sodium foods · Buy foods that are labeled \"unsalted\" (no salt added), \"sodium-free\" (less than 5 mg of sodium per serving), or \"low-sodium\" (140 mg or less of sodium per serving). Foods labeled \"reduced-sodium\" and \"light sodium\" may still have too much sodium. Be sure to read the label to see how much sodium you are getting. · Buy fresh vegetables, or frozen vegetables without added sauces. Buy low-sodium versions of canned vegetables, soups, and other canned goods. Prepare low-sodium meals · Cut back on the amount of salt you use in cooking. This will help you adjust to the taste. Do not add salt after cooking. One teaspoon of salt has about 2,300 mg of sodium. · Take the salt shaker off the table. · Flavor your food with garlic, lemon juice, onion, vinegar, herbs, and spices. Do not use soy sauce, lite soy sauce, steak sauce, onion salt, garlic salt, celery salt, or ketchup on your food. · Use low-sodium salad dressings, sauces, and ketchup. Or make your own salad dressings and sauces without adding salt. · Use less salt (or none) when recipes call for it. You can often use half the salt a recipe calls for without losing flavor. Other foods such as rice, pasta, and grains do not need added salt. · Rinse canned vegetables, and cook them in fresh water. This removes somebut not allof the salt. · Avoid water that is naturally high in sodium or that has been treated with water softeners, which add sodium.  If you buy bottled water, read the label and choose a sodium-free brand. Avoid high-sodium foods · Avoid eating: 
? Smoked, cured, salted, and canned meat, fish, and poultry. ? Ham, hudson, hot dogs, and luncheon meats. ? Regular, hard, and processed cheese and regular peanut butter. ? Crackers with salted tops, and other salted snack foods such as pretzels, chips, and salted popcorn. ? Frozen prepared meals, unless labeled low-sodium. ? Canned and dried soups, broths, and bouillon, unless labeled sodium-free or low-sodium. ? Canned vegetables, unless labeled sodium-free or low-sodium. ? [place] fries, pizza, tacos, and other fast foods. ? Pickles, olives, ketchup, and other condiments, especially soy sauce, unless labeled sodium-free or low-sodium. Where can you learn more? Go to http://www.gray.com/ Enter R442 in the search box to learn more about \"Low Sodium Diet (2,000 Milligram): Care Instructions. \" Current as of: December 17, 2020               Content Version: 12.8 © 1845-9893 [employer]. Care instructions adapted under license by [employer] (which disclaims liability or warranty for this information). If you have questions about a medical condition or this instruction, always ask your healthcare professional. [address] any warranty or liability for your use of this information.

## 2021-04-26 ENCOUNTER — TRANSCRIBE ORDER (OUTPATIENT)
Dept: SCHEDULING | Age: 51
End: 2021-04-26

## 2021-04-26 DIAGNOSIS — Z12.31 VISIT FOR SCREENING MAMMOGRAM: Primary | ICD-10-CM

## 2021-04-29 ENCOUNTER — OFFICE VISIT (OUTPATIENT)
Dept: FAMILY MEDICINE CLINIC | Age: 51
End: 2021-04-29
Payer: MEDICAID

## 2021-04-29 VITALS
BODY MASS INDEX: 50.61 KG/M2 | HEART RATE: 66 BPM | SYSTOLIC BLOOD PRESSURE: 122 MMHG | TEMPERATURE: 98.1 F | OXYGEN SATURATION: 98 % | WEIGHT: 275 LBS | RESPIRATION RATE: 16 BRPM | HEIGHT: 62 IN | DIASTOLIC BLOOD PRESSURE: 82 MMHG

## 2021-04-29 DIAGNOSIS — H92.09 EAR ACHE: Primary | ICD-10-CM

## 2021-04-29 PROCEDURE — 99212 OFFICE O/P EST SF 10 MIN: CPT | Performed by: FAMILY MEDICINE

## 2021-04-29 NOTE — PROGRESS NOTES
Chief Complaint   Patient presents with    Ear Pain     left- started last night     1. Have you been to the ER, urgent care clinic since your last visit? Hospitalized since your last visit? No    2. Have you seen or consulted any other health care providers outside of the [address] [place] [address] since your last visit? Include any pap smears or colon screening.  No

## 2021-04-29 NOTE — PROGRESS NOTES
HISTORY OF PRESENT ILLNESS  Jerel Scanlon is a 45 y.o. female. HPI: Here with a complaint of left earache. Started yesterday night. No recent cold symptoms. No fever. No trouble hearing. No headache or dizziness. During visit sitting comfortable without any acute distress. Not taken any medication for current problem. Does have a history of seasonal allergies. No discomfort over the right ear. No tinnitus. No nausea or vomiting. Visit Vitals  /82 (BP 1 Location: Left arm, BP Patient Position: Sitting, BP Cuff Size: Large adult)   Pulse 66   Temp 98.1 °F (36.7 °C) (Oral)   Resp 16   Ht 5' 2\" (1.575 m)   Wt 275 lb (124.7 kg)   SpO2 98%   BMI 50.30 kg/m²     Review medication list, vitals, problem list,allergies. ROS: See HPI  Physical Exam  HENT:      Right Ear: Ear canal and external ear normal. There is no impacted cerumen. Left Ear: Tympanic membrane, ear canal and external ear normal. There is no impacted cerumen. Neurological:      Mental Status: She is alert and oriented to person, place, and time. ASSESSMENT and PLAN    ICD-10-CM ICD-9-CM    1. Ear ache: No signs of acute infection. Tympanic membrane visualized. Some dryness in the ears. Advised to take the Claritin and put mild olive oil to lubricate the ears. External cleaning. If no improvement in symptoms come back sooner H92.09 388.70    . Patient understood agreed with the plan  Follow-up as needed if symptoms does not improve      Mammogram may 25th  Pap smear at [facility] may 13th  Colonoscopy may 27th     Please note that this dictation was completed with [employer], the [employer] voice recognition software. Quite often unanticipated grammatical, syntax, homophones, and other interpretive errors are inadvertently transcribed by the computer software. Please disregard these errors. Please excuse any errors that have escaped final proofreading.

## 2021-04-29 NOTE — PATIENT INSTRUCTIONS
Earache: Care Instructions Your Care Instructions Even though infection is a common cause of ear pain, not all ear pain means an infection. If you have ear pain and don't have an infection, it could be because of a jaw problem, such as temporomandibular joint (TMJ) pain. Or it could be because of a neck problem. When ear discomfort or pain is mild or comes and goes without other symptoms, home treatment may be all you need. Follow-up care is a key part of your treatment and safety. Be sure to make and go to all appointments, and call your doctor if you are having problems. It's also a good idea to know your test results and keep a list of the medicines you take. How can you care for yourself at home? · Apply heat on the ear to ease pain. To apply heat, put a warm water bottle, a heating pad set on low, or a warm cloth on your ear. Do not go to sleep with a heating pad on your skin. · Take an over-the-counter pain medicine, such as acetaminophen (Tylenol), ibuprofen (Advil, Motrin), or naproxen (Aleve). Be safe with medicines. Read and follow all instructions on the label. · Do not take two or more pain medicines at the same time unless the doctor told you to. Many pain medicines have acetaminophen, which is Tylenol. Too much acetaminophen (Tylenol) can be harmful. · Never insert anything, such as a cotton swab or a anastacio pin, into the ear. When should you call for help? Call your doctor now or seek immediate medical care if: 
  · You have new or worse symptoms of infection, such as: 
? Increased pain, swelling, warmth, or redness. ? Red streaks leading from the area. ? Pus draining from the area. ? A fever. Watch closely for changes in your health, and be sure to contact your doctor if: 
  · You have new or worse discharge coming from the ear.  
  · You do not get better as expected. Where can you learn more? Go to http://www.gray.com/ Enter K957 in the search box to learn more about \"Earache: Care Instructions. \" Current as of: December 2, 2020               Content Version: 12.8 © 2006-2021 Healthwise, Incorporated. Care instructions adapted under license by [employer] (which disclaims liability or warranty for this information). If you have questions about a medical condition or this instruction, always ask your healthcare professional. [place] [address] any warranty or liability for your use of this information.

## 2021-05-10 ENCOUNTER — TELEPHONE (OUTPATIENT)
Dept: FAMILY MEDICINE CLINIC | Age: 51
End: 2021-05-10

## 2021-05-10 NOTE — TELEPHONE ENCOUNTER
Called and gave clarification to pharmacy. Tried to reach patient to advise but no answer and VM is full.

## 2021-05-10 NOTE — TELEPHONE ENCOUNTER
Pt called stating [facility] will not refill her rx for metoprolol tartrate (LOPRESSOR) 100 mg IR tablet. Pt states the pharmacy needs clarification on the rx instructions. Please advise.

## 2021-09-08 ENCOUNTER — OFFICE VISIT (OUTPATIENT)
Dept: FAMILY MEDICINE CLINIC | Age: 51
End: 2021-09-08
Payer: MEDICAID

## 2021-09-08 ENCOUNTER — HOSPITAL ENCOUNTER (OUTPATIENT)
Dept: LAB | Age: 51
Discharge: HOME | End: 2021-09-08

## 2021-09-08 VITALS
SYSTOLIC BLOOD PRESSURE: 120 MMHG | WEIGHT: 274 LBS | HEIGHT: 62 IN | HEART RATE: 67 BPM | BODY MASS INDEX: 50.42 KG/M2 | OXYGEN SATURATION: 99 % | DIASTOLIC BLOOD PRESSURE: 80 MMHG | RESPIRATION RATE: 16 BRPM | TEMPERATURE: 97.7 F

## 2021-09-08 DIAGNOSIS — R73.01 IMPAIRED FASTING BLOOD SUGAR: ICD-10-CM

## 2021-09-08 DIAGNOSIS — R30.9 URINARY PAIN: Primary | ICD-10-CM

## 2021-09-08 DIAGNOSIS — Z12.11 SCREENING FOR COLON CANCER: ICD-10-CM

## 2021-09-08 DIAGNOSIS — Z13.220 SCREENING FOR HYPERLIPIDEMIA: ICD-10-CM

## 2021-09-08 DIAGNOSIS — Z11.59 NEED FOR HEPATITIS C SCREENING TEST: ICD-10-CM

## 2021-09-08 DIAGNOSIS — R21 RASH: ICD-10-CM

## 2021-09-08 DIAGNOSIS — R35.0 URINE FREQUENCY: ICD-10-CM

## 2021-09-08 DIAGNOSIS — I10 ESSENTIAL HYPERTENSION: ICD-10-CM

## 2021-09-08 LAB
BILIRUB UR QL STRIP: NEGATIVE
GLUCOSE UR-MCNC: NEGATIVE MG/DL
HBA1C MFR BLD HPLC: 5.9 %
KETONES P FAST UR STRIP-MCNC: NEGATIVE MG/DL
PH UR STRIP: 6 [PH] (ref 4.6–8)
PROT UR QL STRIP: NEGATIVE
SENTARA SPECIMEN COL,SENBCF: NORMAL
SP GR UR STRIP: 1.02 (ref 1–1.03)
UA UROBILINOGEN AMB POC: NORMAL (ref 0.2–1)
URINALYSIS CLARITY POC: CLEAR
URINALYSIS COLOR POC: YELLOW
URINE BLOOD POC: NEGATIVE
URINE LEUKOCYTES POC: NEGATIVE
URINE NITRITES POC: NEGATIVE

## 2021-09-08 PROCEDURE — 81003 URINALYSIS AUTO W/O SCOPE: CPT | Performed by: FAMILY MEDICINE

## 2021-09-08 PROCEDURE — 99001 SPECIMEN HANDLING PT-LAB: CPT

## 2021-09-08 PROCEDURE — 99214 OFFICE O/P EST MOD 30 MIN: CPT | Performed by: FAMILY MEDICINE

## 2021-09-08 PROCEDURE — 83036 HEMOGLOBIN GLYCOSYLATED A1C: CPT | Performed by: FAMILY MEDICINE

## 2021-09-08 RX ORDER — CLOTRIMAZOLE AND BETAMETHASONE DIPROPIONATE 10; .64 MG/G; MG/G
CREAM TOPICAL
Qty: 30 G | Refills: 0 | Status: SHIPPED | OUTPATIENT
Start: 2021-09-08 | End: 2022-05-18 | Stop reason: SDUPTHER

## 2021-09-08 NOTE — PROGRESS NOTES
Chief Complaint   Patient presents with    Urinary Pain    Urinary Frequency    Rash     at left side of breast

## 2021-09-08 NOTE — PROGRESS NOTES
HISTORY OF PRESENT ILLNESS  Viola Herrera is a 45 y.o. female. HPI: : Here with couple of concerns. Complaining of urine order, frequency and discomfort. Going on since cleared for days. No blood in the urine. No nausea vomiting or fever. No back pain or abdominal pain. During visit sitting comfortable without any acute distress. UA done at the office showed no signs of infection. She does have a history of prediabetes. A1c still in prediabetic range. Also concerned about the rash over the left mid axillary line around the nipple level. Said when it was too hot it was irritation by the bra and scratching made it worse. It has raised border and looks like a fungal infection. Not taken any medication for that yet. No rash anywhere else. During visit sitting comfortable without any acute distress. No headache or dizziness. No chest pain or shortness of breath. No palpitation or diaphoresis. No nausea vomiting or abdominal pain. No bowel complaint. No known changes. Sleep is fair. Vitals been stable. Visit Vitals  /80 (BP 1 Location: Left arm, BP Patient Position: Sitting, BP Cuff Size: Large adult)   Pulse 67   Temp 97.7 °F (36.5 °C) (Temporal)   Resp 16   Ht 5' 2\" (1.575 m)   Wt 274 lb (124.3 kg)   SpO2 99%   BMI 50.12 kg/m²     Review medication list, vitals, problem list,allergies. Review prior labs.    Lab Results   Component Value Date/Time    WBC 7.7 08/12/2020 09:55 AM    HGB 13.4 08/12/2020 09:55 AM    HCT 43.6 08/12/2020 09:55 AM    PLATELET 655 61/34/7351 09:55 AM    MCV 91 08/12/2020 09:55 AM     Lab Results   Component Value Date/Time    Sodium 140 02/10/2021 08:30 AM    Potassium 3.6 02/10/2021 08:30 AM    Chloride 97 (L) 02/10/2021 08:30 AM    CO2 32 02/10/2021 08:30 AM    Anion gap 11.0 02/10/2021 08:30 AM    Glucose 117 (H) 02/10/2021 08:30 AM    BUN 12 02/10/2021 08:30 AM    Creatinine 0.8 02/10/2021 08:30 AM    BUN/Creatinine ratio 14 04/16/2015 01:43 AM    GFR est AA >60 04/16/2015 01:43 AM    GFR est non-AA >60 04/16/2015 01:43 AM    Calcium 9.8 02/10/2021 08:30 AM    Bilirubin, total 0.3 02/10/2021 08:30 AM    Alk. phosphatase 82 02/10/2021 08:30 AM    Protein, total 7.7 02/10/2021 08:30 AM    Albumin 4.0 02/10/2021 08:30 AM    Globulin 3.7 02/10/2021 08:30 AM    A-G Ratio 1.1 02/10/2021 08:30 AM    ALT (SGPT) 22 02/10/2021 08:30 AM    AST (SGOT) 18 02/10/2021 08:30 AM     Lab Results   Component Value Date/Time    Cholesterol, total 137 05/21/2020 08:24 AM    HDL Cholesterol 44 05/21/2020 08:24 AM    LDL, calculated 79 05/21/2020 08:24 AM    VLDL, calculated 14 05/21/2020 08:24 AM    Triglyceride 68 05/21/2020 08:24 AM    CHOL/HDL Ratio 2.7 06/15/2010 12:20 PM     Lab Results   Component Value Date/Time    TSH 2.51 07/25/2018 10:11 AM     Lab Results   Component Value Date/Time    Hemoglobin A1c 5.8 (H) 02/10/2021 08:30 AM    Hemoglobin A1c (POC) 5.9 09/08/2021 09:32 AM         ROS: See HPI    Physical Exam  Constitutional:       General: She is not in acute distress. Cardiovascular:      Rate and Rhythm: Normal rate and regular rhythm. Heart sounds: Normal heart sounds. Abdominal:      General: Bowel sounds are normal.      Palpations: Abdomen is soft. Tenderness: There is no abdominal tenderness. Comments: No CVA tenderness bilaterally   Skin:     Comments: Flat rash with raised margin. Circular and erythematous rash around mid axillary and nipple line. Nontender. No open skin. No discharge. Itching present   Neurological:      Mental Status: She is oriented to person, place, and time. Psychiatric:         Behavior: Behavior normal.         ASSESSMENT and PLAN    ICD-10-CM ICD-9-CM    1. Urinary pain: At this time sending culture. UA showed no signs of infection. A1c also prediabetic range. Advised to drink more water and observe.   If no improvement in symptoms advised to come back sooner R30.9 788.1 AMB POC URINALYSIS DIP STICK AUTO W/O MICRO CULTURE, URINE   2. Rash: Given topical cream.  Advised to come back in a couple of weeks if no improvement in rash R21 782.1 clotrimazole-betamethasone (LOTRISONE) topical cream   3. Impaired fasting blood sugar: Discussed the lifestyle and diet modification. Discussed the importance of weight loss and exercise. A1c in prediabetic range R73.01 790.21 AMB POC HEMOGLOBIN O6S      METABOLIC PANEL, COMPREHENSIVE      CBC W/O DIFF   4. Urine frequency  R35.0 788.41 AMB POC HEMOGLOBIN Y6G      METABOLIC PANEL, COMPREHENSIVE   5. Screening for colon cancer  Z12.11 V76.51 REFERRAL TO GASTROENTEROLOGY   6. Need for hepatitis C screening test  Z11.59 V73.89 HEPATITIS C AB   7. Screening for hyperlipidemia  Z13.220 V77.91 LIPID PANEL   8. BMI 50.0-59.9, adult [facility]): Again discussed lifestyle modification, exercise and diet modification. Discussed importance of weight loss Z68.43 V85.43    9. Essential hypertension :well controlled. Continue current dose of medication and low salt diet. Exercise as tolerated. I10 401.9    Patient understood agreed with the plan  Reminded to complete the mammogram  She is going to take the Tdap from work  Agreed to go for colonoscopy. Referral been done today  She had done Pap smear at [facility]  Follow-up and Dispositions    · Return in about 3 months (around 12/8/2021).

## 2021-09-08 NOTE — PATIENT INSTRUCTIONS
A Healthy Lifestyle: Care Instructions  Your Care Instructions     A healthy lifestyle can help you feel good, stay at a healthy weight, and have plenty of energy for both work and play. A healthy lifestyle is something you can share with your whole family. A healthy lifestyle also can lower your risk for serious health problems, such as high blood pressure, heart disease, and diabetes. You can follow a few steps listed below to improve your health and the health of your family. Follow-up care is a key part of your treatment and safety. Be sure to make and go to all appointments, and call your doctor if you are having problems. It's also a good idea to know your test results and keep a list of the medicines you take. How can you care for yourself at home? · Do not eat too much sugar, fat, or fast foods. You can still have dessert and treats now and then. The goal is moderation. · Start small to improve your eating habits. Pay attention to portion sizes, drink less juice and soda pop, and eat more fruits and vegetables. ? Eat a healthy amount of food. A 3-ounce serving of meat, for example, is about the size of a deck of cards. Fill the rest of your plate with vegetables and whole grains. ? Limit the amount of soda and sports drinks you have every day. Drink more water when you are thirsty. ? Eat plenty of fruits and vegetables every day. Have an apple or some carrot sticks as an afternoon snack instead of a candy bar. Try to have fruits and/or vegetables at every meal.  · Make exercise part of your daily routine. You may want to start with simple activities, such as walking, bicycling, or slow swimming. Try to be active 30 to 60 minutes every day. You do not need to do all 30 to 60 minutes all at once. For example, you can exercise 3 times a day for 10 or 20 minutes.  Moderate exercise is safe for most people, but it is always a good idea to talk to your doctor before starting an exercise program.  · Keep moving. Bethanie Contreras the lawn, work in the garden, or [employer]. Take the stairs instead of the elevator at work. · If you smoke, quit. People who smoke have an increased risk for heart attack, stroke, cancer, and other lung illnesses. Quitting is hard, but there are ways to boost your chance of quitting tobacco for good. ? Use nicotine gum, patches, or lozenges. ? Ask your doctor about stop-smoking programs and medicines. ? Keep trying. In addition to reducing your risk of diseases in the future, you will notice some benefits soon after you stop using tobacco. If you have shortness of breath or asthma symptoms, they will likely get better within a few weeks after you quit. · Limit how much alcohol you drink. Moderate amounts of alcohol (up to 2 drinks a day for men, 1 drink a day for women) are okay. But drinking too much can lead to liver problems, high blood pressure, and other health problems. Family health  If you have a family, there are many things you can do together to improve your health. · Eat meals together as a family as often as possible. · Eat healthy foods. This includes fruits, vegetables, lean meats and dairy, and whole grains. · Include your family in your fitness plan. Most people think of activities such as jogging or tennis as the way to fitness, but there are many ways you and your family can be more active. Anything that makes you breathe hard and gets your heart pumping is exercise. Here are some tips:  ? Walk to do errands or to take your child to school or the bus.  ? Go for a family bike ride after dinner instead of watching TV. Where can you learn more? Go to http://www.gray.com/  Enter D915 in the search box to learn more about \"A Healthy Lifestyle: Care Instructions. \"  Current as of: September 23, 2020               Content Version: 12.8  © 4494-1892 Healthwise, Incorporated.    Care instructions adapted under license by [employer] (which disclaims liability or warranty for this information). If you have questions about a medical condition or this instruction, always ask your healthcare professional. [address] any warranty or liability for your use of this information. Prediabetes: Care Instructions  Overview     Prediabetes is a warning sign that you're at risk for getting type 2 diabetes. It means that your blood sugar is higher than it should be. But it's not high enough to be diabetes. The food you eat naturally turns into sugar. Your body uses the sugar for energy. Normally, an organ called the pancreas makes insulin. And insulin allows the sugar in your blood to get into your body's cells. But sometimes the body can't use insulin the right way. So the sugar stays in your blood instead. This is called insulin resistance. The buildup of sugar in your blood means you have prediabetes. The good news is that you may be able to prevent or delay diabetes. Making small lifestyle changes, like getting active and changing your eating habits, may help you get your blood sugar back to normal. You can work with your doctor to make a treatment plan. Follow-up care is a key part of your treatment and safety. Be sure to make and go to all appointments, and call your doctor if you are having problems. It's also a good idea to know your test results and keep a list of the medicines you take. How can you care for yourself at home? · Watch your weight. A healthy weight helps your body use insulin properly. · Limit the amount of calories, sweets, and unhealthy fat you eat. Ask your doctor if you should see a dietitian. A registered dietitian can help you create meal plans that fit your lifestyle. · Get at least 30 minutes of exercise on most days of the week. Exercise helps control your blood sugar. It also helps you maintain a healthy weight. Walking is a good choice.  You also may want to do other activities, such as running, swimming, cycling, or playing tennis or team sports. · Do not smoke. Smoking can make prediabetes worse. If you need help quitting, talk to your doctor about stop-smoking programs and medicines. These can increase your chances of quitting for good. · If your doctor prescribed medicines, take them exactly as prescribed. Call your doctor if you think you are having a problem with your medicine. You will get more details on the specific medicines your doctor prescribes. When should you call for help? Watch closely for changes in your health, and be sure to contact your doctor if:    · You have any symptoms of diabetes. These may include:  ? Being thirsty more often. ? Urinating more. ? Being hungrier. ? Losing weight. ? Being very tired. ? Having blurry vision.     · You have a wound that will not heal.     · You have an infection that will not go away.     · You have problems with your blood pressure.     · You want more information about diabetes and how you can keep from getting it. Where can you learn more? Go to http://www.gray.com/  Enter I222 in the search box to learn more about \"Prediabetes: Care Instructions. \"  Current as of: August 31, 2020               Content Version: 12.8  © 2006-2021 Healthwise, Incorporated. Care instructions adapted under license by [employer] (which disclaims liability or warranty for this information). If you have questions about a medical condition or this instruction, always ask your healthcare professional. [address] any warranty or liability for your use of this information.

## 2021-09-10 LAB — RESULT: NORMAL

## 2021-09-17 NOTE — PROGRESS NOTES
Contacted patient and verified identity using name and date of birth (2- identifiers)  Spoke with patient and she verbalized understanding of Negative urine culture

## 2021-09-22 ENCOUNTER — OFFICE VISIT (OUTPATIENT)
Dept: ORTHOPEDIC SURGERY | Age: 51
End: 2021-09-22
Payer: MEDICAID

## 2021-09-22 VITALS
OXYGEN SATURATION: 96 % | BODY MASS INDEX: 49.87 KG/M2 | RESPIRATION RATE: 16 BRPM | HEART RATE: 58 BPM | HEIGHT: 62 IN | WEIGHT: 271 LBS

## 2021-09-22 DIAGNOSIS — M17.12 PRIMARY OSTEOARTHRITIS OF LEFT KNEE: ICD-10-CM

## 2021-09-22 DIAGNOSIS — G89.29 CHRONIC PAIN OF RIGHT KNEE: Primary | ICD-10-CM

## 2021-09-22 DIAGNOSIS — M25.562 CHRONIC PAIN OF LEFT KNEE: ICD-10-CM

## 2021-09-22 DIAGNOSIS — G89.29 CHRONIC PAIN OF LEFT KNEE: ICD-10-CM

## 2021-09-22 DIAGNOSIS — M17.11 PRIMARY OSTEOARTHRITIS OF RIGHT KNEE: ICD-10-CM

## 2021-09-22 DIAGNOSIS — M25.561 CHRONIC PAIN OF RIGHT KNEE: Primary | ICD-10-CM

## 2021-09-22 PROCEDURE — 20610 DRAIN/INJ JOINT/BURSA W/O US: CPT | Performed by: SPECIALIST

## 2021-09-22 PROCEDURE — 73562 X-RAY EXAM OF KNEE 3: CPT | Performed by: SPECIALIST

## 2021-09-22 PROCEDURE — 99213 OFFICE O/P EST LOW 20 MIN: CPT | Performed by: SPECIALIST

## 2021-09-22 RX ORDER — BETAMETHASONE SODIUM PHOSPHATE AND BETAMETHASONE ACETATE 3; 3 MG/ML; MG/ML
3 INJECTION, SUSPENSION INTRA-ARTICULAR; INTRALESIONAL; INTRAMUSCULAR; SOFT TISSUE ONCE
Status: COMPLETED | OUTPATIENT
Start: 2021-09-22 | End: 2021-09-22

## 2021-09-22 RX ORDER — DICLOFENAC SODIUM 10 MG/G
4 GEL TOPICAL 4 TIMES DAILY
Qty: 5 EACH | Refills: 5 | Status: SHIPPED | OUTPATIENT
Start: 2021-09-22 | End: 2021-11-30 | Stop reason: ALTCHOICE

## 2021-09-22 RX ADMIN — BETAMETHASONE SODIUM PHOSPHATE AND BETAMETHASONE ACETATE 3 MG: 3; 3 INJECTION, SUSPENSION INTRA-ARTICULAR; INTRALESIONAL; INTRAMUSCULAR; SOFT TISSUE at 09:08
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
## 2021-09-22 NOTE — LETTER
NOTIFICATION RETURN TO WORK     9/22/2021 8:59 AM    Ms. Roxanna Garcia  [address] [place] [phone]      To Whom It May Concern:    Roxanna Garcia is currently under the care of [address] [place], [place] [address]paola Ferrera. She will return to work on: Tuesday 9-28-21. Please excuse her from work until then. If there are questions or concerns please have the patient contact our office.         Sincerely,        Deepthi Ferrara MD

## 2021-11-30 RX ORDER — IBUPROFEN 800 MG/1
TABLET ORAL
Qty: 90 TABLET | Refills: 0 | Status: SHIPPED | OUTPATIENT
Start: 2021-11-30 | End: 2022-01-07

## 2021-11-30 NOTE — TELEPHONE ENCOUNTER
Pt states that she is taking the Lisinopril-hctz , ibuprofen 800 mg Vitamin D and she said that she was taking Metoprolol only. Please let her know if theophylline ibuprofen is going to be filled. Thank you  .

## 2021-12-29 ENCOUNTER — OFFICE VISIT (OUTPATIENT)
Dept: ORTHOPEDIC SURGERY | Age: 51
End: 2021-12-29
Payer: MEDICAID

## 2021-12-29 VITALS
WEIGHT: 276 LBS | HEART RATE: 66 BPM | OXYGEN SATURATION: 100 % | BODY MASS INDEX: 50.79 KG/M2 | TEMPERATURE: 97.7 F | HEIGHT: 62 IN | RESPIRATION RATE: 16 BRPM

## 2021-12-29 DIAGNOSIS — M54.32 LEFT SIDED SCIATICA: Primary | ICD-10-CM

## 2021-12-29 DIAGNOSIS — M54.42 LEFT-SIDED LOW BACK PAIN WITH LEFT-SIDED SCIATICA, UNSPECIFIED CHRONICITY: ICD-10-CM

## 2021-12-29 PROCEDURE — 99213 OFFICE O/P EST LOW 20 MIN: CPT | Performed by: SPECIALIST

## 2021-12-29 PROCEDURE — 20552 NJX 1/MLT TRIGGER POINT 1/2: CPT | Performed by: SPECIALIST

## 2021-12-29 RX ORDER — BETAMETHASONE SODIUM PHOSPHATE AND BETAMETHASONE ACETATE 3; 3 MG/ML; MG/ML
3 INJECTION, SUSPENSION INTRA-ARTICULAR; INTRALESIONAL; INTRAMUSCULAR; SOFT TISSUE ONCE
Status: COMPLETED | OUTPATIENT
Start: 2021-12-29 | End: 2021-12-29

## 2021-12-29 RX ADMIN — BETAMETHASONE SODIUM PHOSPHATE AND BETAMETHASONE ACETATE 3 MG: 3; 3 INJECTION, SUSPENSION INTRA-ARTICULAR; INTRALESIONAL; INTRAMUSCULAR; SOFT TISSUE at 09:50

## 2021-12-29 NOTE — LETTER
NOTIFICATION RETURN TO WORK / SCHOOL    12/29/2021 9:46 AM    Ms. Anna Silvestre  [address] [place] [phone]      To Whom It May Concern:    Anna Silvestre is currently under the care of [address] [place] [address] Demond Ferrera. She will remain out of work for the rest of the week and return to work on Monday, Aristides 3. If there are questions or concerns please have the patient contact our office.         Sincerely,      Tati Dunn MD
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
## 2022-01-03 ENCOUNTER — OFFICE VISIT (OUTPATIENT)
Dept: FAMILY MEDICINE CLINIC | Age: 52
End: 2022-01-03
Payer: MEDICAID

## 2022-01-03 VITALS
HEART RATE: 72 BPM | BODY MASS INDEX: 51.16 KG/M2 | TEMPERATURE: 98 F | WEIGHT: 278 LBS | OXYGEN SATURATION: 100 % | DIASTOLIC BLOOD PRESSURE: 82 MMHG | RESPIRATION RATE: 16 BRPM | HEIGHT: 62 IN | SYSTOLIC BLOOD PRESSURE: 126 MMHG

## 2022-01-03 DIAGNOSIS — Z13.220 SCREENING FOR HYPERLIPIDEMIA: ICD-10-CM

## 2022-01-03 DIAGNOSIS — Z12.31 ENCOUNTER FOR SCREENING MAMMOGRAM FOR MALIGNANT NEOPLASM OF BREAST: ICD-10-CM

## 2022-01-03 DIAGNOSIS — E66.01 MORBID OBESITY WITH BMI OF 45.0-49.9, ADULT (HCC): Primary | ICD-10-CM

## 2022-01-03 DIAGNOSIS — R09.81 SINUS CONGESTION: ICD-10-CM

## 2022-01-03 DIAGNOSIS — Z11.59 NEED FOR HEPATITIS C SCREENING TEST: ICD-10-CM

## 2022-01-03 DIAGNOSIS — I10 ESSENTIAL HYPERTENSION: ICD-10-CM

## 2022-01-03 DIAGNOSIS — R73.01 IMPAIRED FASTING BLOOD SUGAR: ICD-10-CM

## 2022-01-03 PROCEDURE — 99214 OFFICE O/P EST MOD 30 MIN: CPT | Performed by: FAMILY MEDICINE

## 2022-01-03 RX ORDER — METOPROLOL TARTRATE 100 MG/1
100 TABLET ORAL DAILY
COMMUNITY
End: 2022-04-28

## 2022-01-03 NOTE — PATIENT INSTRUCTIONS
A Healthy Lifestyle: Care Instructions  Your Care Instructions     A healthy lifestyle can help you feel good, stay at a healthy weight, and have plenty of energy for both work and play. A healthy lifestyle is something you can share with your whole family. A healthy lifestyle also can lower your risk for serious health problems, such as high blood pressure, heart disease, and diabetes. You can follow a few steps listed below to improve your health and the health of your family. Follow-up care is a key part of your treatment and safety. Be sure to make and go to all appointments, and call your doctor if you are having problems. It's also a good idea to know your test results and keep a list of the medicines you take. How can you care for yourself at home? · Do not eat too much sugar, fat, or fast foods. You can still have dessert and treats now and then. The goal is moderation. · Start small to improve your eating habits. Pay attention to portion sizes, drink less juice and soda pop, and eat more fruits and vegetables. ? Eat a healthy amount of food. A 3-ounce serving of meat, for example, is about the size of a deck of cards. Fill the rest of your plate with vegetables and whole grains. ? Limit the amount of soda and sports drinks you have every day. Drink more water when you are thirsty. ? Eat plenty of fruits and vegetables every day. Have an apple or some carrot sticks as an afternoon snack instead of a candy bar. Try to have fruits and/or vegetables at every meal.  · Make exercise part of your daily routine. You may want to start with simple activities, such as walking, bicycling, or slow swimming. Try to be active 30 to 60 minutes every day. You do not need to do all 30 to 60 minutes all at once. For example, you can exercise 3 times a day for 10 or 20 minutes.  Moderate exercise is safe for most people, but it is always a good idea to talk to your doctor before starting an exercise program.  · Keep moving. [place] the lawn, work in the garden, or [place]. Take the stairs instead of the elevator at work. · If you smoke, quit. People who smoke have an increased risk for heart attack, stroke, cancer, and other lung illnesses. Quitting is hard, but there are ways to boost your chance of quitting tobacco for good. ? Use nicotine gum, patches, or lozenges. ? Ask your doctor about stop-smoking programs and medicines. ? Keep trying. In addition to reducing your risk of diseases in the future, you will notice some benefits soon after you stop using tobacco. If you have shortness of breath or asthma symptoms, they will likely get better within a few weeks after you quit. · Limit how much alcohol you drink. Moderate amounts of alcohol (up to 2 drinks a day for men, 1 drink a day for women) are okay. But drinking too much can lead to liver problems, high blood pressure, and other health problems. Family health  If you have a family, there are many things you can do together to improve your health. · Eat meals together as a family as often as possible. · Eat healthy foods. This includes fruits, vegetables, lean meats and dairy, and whole grains. · Include your family in your fitness plan. Most people think of activities such as jogging or tennis as the way to fitness, but there are many ways you and your family can be more active. Anything that makes you breathe hard and gets your heart pumping is exercise. Here are some tips:  ? Walk to do errands or to take your child to school or the bus.  ? Go for a family bike ride after dinner instead of watching TV. Where can you learn more? Go to http://www.gray.com/  Enter Z319 in the search box to learn more about \"A Healthy Lifestyle: Care Instructions. \"  Current as of: June 16, 2021               Content Version: 13.0  © 3999-8166 Healthwise, Incorporated.    Care instructions adapted under license by [employer] (which disclaims liability or warranty for this information). If you have questions about a medical condition or this instruction, always ask your healthcare professional. [address] any warranty or liability for your use of this information. Prediabetes: Care Instructions  Overview     Prediabetes is a warning sign that you're at risk for getting type 2 diabetes. It means that your blood sugar is higher than it should be. But it's not high enough to be diabetes. The food you eat naturally turns into sugar. Your body uses the sugar for energy. Normally, an organ called the pancreas makes insulin. And insulin allows the sugar in your blood to get into your body's cells. But sometimes the body can't use insulin the right way. So the sugar stays in your blood instead. This is called insulin resistance. The buildup of sugar in your blood means you have prediabetes. The good news is that you may be able to prevent or delay diabetes. Making small lifestyle changes, like getting active and changing your eating habits, may help you get your blood sugar back to normal. You can work with your doctor to make a treatment plan. Follow-up care is a key part of your treatment and safety. Be sure to make and go to all appointments, and call your doctor if you are having problems. It's also a good idea to know your test results and keep a list of the medicines you take. How can you care for yourself at home? · Watch your weight. A healthy weight helps your body use insulin properly. · Limit the amount of calories, sweets, and unhealthy fat you eat. Ask your doctor if you should see a dietitian. A registered dietitian can help you create meal plans that fit your lifestyle. · Get at least 30 minutes of exercise on most days of the week. Exercise helps control your blood sugar. It also helps you maintain a healthy weight. Walking is a good choice.  You also may want to do other activities, such as running, swimming, cycling, or playing tennis or team sports. · Do not smoke. Smoking can make prediabetes worse. If you need help quitting, talk to your doctor about stop-smoking programs and medicines. These can increase your chances of quitting for good. · If your doctor prescribed medicines, take them exactly as prescribed. Call your doctor if you think you are having a problem with your medicine. You will get more details on the specific medicines your doctor prescribes. When should you call for help? Watch closely for changes in your health, and be sure to contact your doctor if:    · You have any symptoms of diabetes. These may include:  ? Being thirsty more often. ? Urinating more. ? Being hungrier. ? Losing weight. ? Being very tired. ? Having blurry vision.     · You have a wound that will not heal.     · You have an infection that will not go away.     · You have problems with your blood pressure.     · You want more information about diabetes and how you can keep from getting it. Where can you learn more? Go to http://www.gray.com/  Enter I222 in the search box to learn more about \"Prediabetes: Care Instructions. \"  Current as of: August 31, 2020               Content Version: 13.0  © 2006-2021 Healthwise, Incorporated. Care instructions adapted under license by [employer] (which disclaims liability or warranty for this information). If you have questions about a medical condition or this instruction, always ask your healthcare professional. [place] [address] any warranty or liability for your use of this information. Starting a Weight Loss Plan: Care Instructions  Overview     If you're thinking about losing weight, it can be hard to know where to start. Your doctor can help you set up a weight loss plan that best meets your needs.  You may want to take a class on nutrition or exercise, or you could join a weight loss support group. If you have questions about how to make changes to your eating or exercise habits, ask your doctor about seeing a registered dietitian or an exercise specialist.  It can be a big challenge to lose weight. But you don't have to make huge changes at once. Make small changes, and stick with them. When those changes become habit, add a few more changes. If you don't think you're ready to make changes right now, try to pick a date in the future. Make an appointment to see your doctor to discuss whether the time is right for you to start a plan. Follow-up care is a key part of your treatment and safety. Be sure to make and go to all appointments, and call your doctor if you are having problems. It's also a good idea to know your test results and keep a list of the medicines you take. How can you care for yourself at home? · Set realistic goals. Many people expect to lose much more weight than is likely. A weight loss of 5% to 10% of your body weight may be enough to improve your health. · Get family and friends involved to provide support. Talk to them about why you are trying to lose weight, and ask them to help. They can help by participating in exercise and having meals with you, even if they may be eating something different. · Find what works best for you. If you do not have time or do not like to cook, a program that offers meal replacement bars or shakes may be better for you. Or if you like to prepare meals, finding a plan that includes daily menus and recipes may be best.  · Ask your doctor about other health professionals who can help you achieve your weight loss goals. ? A dietitian can help you make healthy changes in your diet. ?  An exercise specialist or  can help you develop a safe and effective exercise program.  ? A counselor or psychiatrist can help you cope with issues such as depression, anxiety, or family problems that can make it hard to focus on weight loss. · Consider joining a support group for people who are trying to lose weight. Your doctor can suggest groups in your area. Where can you learn more? Go to http://www.gray.com/  Enter U357 in the search box to learn more about \"Starting a Weight Loss Plan: Care Instructions. \"  Current as of: March 17, 2021               Content Version: 13.0  © 9714-2302 [employer]. Care instructions adapted under license by [employer] (which disclaims liability or warranty for this information). If you have questions about a medical condition or this instruction, always ask your healthcare professional. [address] any warranty or liability for your use of this information.

## 2022-01-03 NOTE — PROGRESS NOTES
Chief Complaint   Patient presents with    Other     Pre-DM- no labs completed    Hypertension     1. \"Have you been to the ER, urgent care clinic since your last visit? Hospitalized since your last visit? \" No    2. \"Have you seen or consulted any other health care providers outside of the [address] [place] [address] since your last visit? \" Elsy Avery- Pap in 3 weeks ago     3. For patients aged 39-70: Has the patient had a colonoscopy / FIT/ Cologuard? No     If the patient is female:    4. For patients aged 41-77: Has the patient had a mammogram within the past 2 years? No    5. For patients aged 21-65: Has the patient had a pap smear? Yes, but HM not satisfied.  Rooming MA/LPN to request most recent results

## 2022-01-03 NOTE — LETTER
NOTIFICATION RETURN TO WORK / SCHOOL    1/3/2022 2:48 PM    Ms. Jorge Akers  [address] [place] [id number]      To Whom It May Concern:    Jorge Akers is currently under the care of [facility]. She will return to work/school on: 01/03/22    If there are questions or concerns please have the patient contact our office.         Sincerely,      Anton Armijo MD

## 2022-01-03 NOTE — PROGRESS NOTES
HISTORY OF PRESENT ILLNESS  Dalia Geronimo is a 45 y.o. female. HPI: Here for follow-up. History of hypertension. Vitals been stable. Asymptomatic. Sitting comfortable without any acute distress. Denies any headache, dizziness, no chest pain or trouble breathing, no arm or leg weakness. No nausea or vomiting, no weight or appetite changes, no mood changes . No urine or bowel complains, no palpitation, no diaphoresis. No abdominal pain. No cold or cough. No leg swelling. No fever. No sleep trouble. Prediabetes. Noncompliant with the diet and lifestyle modification but trying to work on it now. High BMI. Discussed importance of weight loss. She will work on exercise and diet modification and try to lose weight. Visit Vitals  /82 (BP 1 Location: Left arm, BP Patient Position: Sitting, BP Cuff Size: Large adult)   Pulse 72   Temp 98 °F (36.7 °C) (Temporal)   Resp 16   Ht 5' 2\" (1.575 m)   Wt 278 lb (126.1 kg)   SpO2 100%   BMI 50.85 kg/m²     Review medication list, vitals, problem list,allergies. Lab Results   Component Value Date/Time    WBC 7.7 08/12/2020 09:55 AM    HGB 13.4 08/12/2020 09:55 AM    HCT 43.6 08/12/2020 09:55 AM    PLATELET 497 04/99/9008 09:55 AM    MCV 91 08/12/2020 09:55 AM     Lab Results   Component Value Date/Time    Sodium 140 02/10/2021 08:30 AM    Potassium 3.6 02/10/2021 08:30 AM    Chloride 97 (L) 02/10/2021 08:30 AM    CO2 32 02/10/2021 08:30 AM    Anion gap 11.0 02/10/2021 08:30 AM    Glucose 117 (H) 02/10/2021 08:30 AM    BUN 12 02/10/2021 08:30 AM    Creatinine 0.8 02/10/2021 08:30 AM    BUN/Creatinine ratio 14 04/16/2015 01:43 AM    GFR est AA >60 04/16/2015 01:43 AM    GFR est non-AA >60 04/16/2015 01:43 AM    Calcium 9.8 02/10/2021 08:30 AM    Bilirubin, total 0.3 02/10/2021 08:30 AM    Alk.  phosphatase 82 02/10/2021 08:30 AM    Protein, total 7.7 02/10/2021 08:30 AM    Albumin 4.0 02/10/2021 08:30 AM    Globulin 3.7 02/10/2021 08:30 AM    A-G Ratio 1.1 02/10/2021 08:30 AM    ALT (SGPT) 22 02/10/2021 08:30 AM    AST (SGOT) 18 02/10/2021 08:30 AM     Lab Results   Component Value Date/Time    Cholesterol, total 137 05/21/2020 08:24 AM    HDL Cholesterol 44 05/21/2020 08:24 AM    LDL, calculated 79 05/21/2020 08:24 AM    VLDL, calculated 14 05/21/2020 08:24 AM    Triglyceride 68 05/21/2020 08:24 AM    CHOL/HDL Ratio 2.7 06/15/2010 12:20 PM     Lab Results   Component Value Date/Time    TSH 2.51 07/25/2018 10:11 AM     Lab Results   Component Value Date/Time    Hemoglobin A1c 5.8 (H) 02/10/2021 08:30 AM    Hemoglobin A1c (POC) 5.9 09/08/2021 09:32 AM             ROS: See HPI    Physical Exam  Constitutional:       General: She is not in acute distress. Cardiovascular:      Rate and Rhythm: Normal rate and regular rhythm. Heart sounds: Normal heart sounds. Abdominal:      General: Bowel sounds are normal.      Palpations: Abdomen is soft. Tenderness: There is no abdominal tenderness. Musculoskeletal:         General: No swelling. Cervical back: Neck supple. Neurological:      Mental Status: She is oriented to person, place, and time. Psychiatric:         Behavior: Behavior normal.         ASSESSMENT and PLAN      ICD-10-CM ICD-9-CM    1. Morbid obesity with BMI of 45.0-49.9, adult [facility]): Given the handout on weight loss plan. She will work on exercise and diet modification. Follow-up next visit E66.01 278.01 TSH 3RD GENERATION    Z68.42 V85.42    2. Essential hypertension:well controlled. Continue current dose of medication and low salt diet. Exercise as tolerated. I10 401.9 CBC W/O DIFF      METABOLIC PANEL, COMPREHENSIVE   3. Impaired fasting blood sugar: She will work on exercise and diet modification. She will work on weight loss as well R73.01 790.21 CBC W/O DIFF      METABOLIC PANEL, COMPREHENSIVE      TSH 3RD GENERATION   4. Screening for hyperlipidemia  Z13.220 V77.91 LIPID PANEL   5.  Need for hepatitis C screening test  Z11.59 V73.89 HEPATITIS C AB   6. Encounter for screening mammogram for malignant neoplasm of breast  Z12.31 V76.12 [facility] MAMMO BI SCREENING INCL CAD   Patient understood agreed with the plan  Review hM   Has appt with GI this week on 6th   Pap done at [facility] back in April 2021   Follow-up and Dispositions    · Return in about 4 months (around 5/3/2022).

## 2022-01-04 RX ORDER — FLUTICASONE PROPIONATE 50 MCG
SPRAY, SUSPENSION (ML) NASAL
Qty: 16 G | Refills: 0 | Status: SHIPPED | OUTPATIENT
Start: 2022-01-04 | End: 2022-05-03 | Stop reason: SDUPTHER

## 2022-01-07 RX ORDER — IBUPROFEN 800 MG/1
TABLET ORAL
Qty: 90 TABLET | Refills: 0 | Status: SHIPPED | OUTPATIENT
Start: 2022-01-07 | End: 2022-08-02 | Stop reason: ALTCHOICE

## 2022-01-19 ENCOUNTER — HOSPITAL ENCOUNTER (OUTPATIENT)
Dept: LAB | Age: 52
Discharge: HOME | End: 2022-01-19

## 2022-01-19 LAB — SENTARA SPECIMEN COL,SENBCF: NORMAL

## 2022-01-19 PROCEDURE — 99001 SPECIMEN HANDLING PT-LAB: CPT

## 2022-01-20 LAB
A-G RATIO,AGRAT: 1.1 RATIO (ref 1.1–2.6)
ALBUMIN SERPL-MCNC: 3.9 G/DL (ref 3.5–5)
ALP SERPL-CCNC: 75 U/L (ref 25–115)
ALT SERPL-CCNC: 22 U/L (ref 5–40)
ANION GAP SERPL CALC-SCNC: 10 MMOL/L (ref 3–15)
AST SERPL W P-5'-P-CCNC: 19 U/L (ref 10–37)
BILIRUB SERPL-MCNC: 0.5 MG/DL (ref 0.2–1.2)
BUN SERPL-MCNC: 11 MG/DL (ref 6–22)
CALCIUM SERPL-MCNC: 9.6 MG/DL (ref 8.4–10.5)
CHLORIDE SERPL-SCNC: 102 MMOL/L (ref 98–110)
CHOLEST SERPL-MCNC: 141 MG/DL (ref 110–200)
CO2 SERPL-SCNC: 29 MMOL/L (ref 20–32)
CREAT SERPL-MCNC: 0.8 MG/DL (ref 0.5–1.2)
ERYTHROCYTE [DISTWIDTH] IN BLOOD BY AUTOMATED COUNT: 13.5 % (ref 10–15.5)
GFRAA, 66117: >60
GFRNA, 66118: >60
GLOBULIN,GLOB: 3.4 G/DL (ref 2–4)
GLUCOSE SERPL-MCNC: 72 MG/DL (ref 70–99)
HCT VFR BLD AUTO: 42.1 % (ref 35.1–48)
HCV AB SER IA-ACNC: NORMAL
HDLC SERPL-MCNC: 2.9 MG/DL (ref 0–5)
HDLC SERPL-MCNC: 49 MG/DL
HGB BLD-MCNC: 13.7 G/DL (ref 11.7–16)
LDL/HDL RATIO,LDHD: 1.6
LDLC SERPL CALC-MCNC: 80 MG/DL (ref 50–99)
MCH RBC QN AUTO: 29 PG (ref 26–34)
MCHC RBC AUTO-ENTMCNC: 33 G/DL (ref 31–36)
MCV RBC AUTO: 89 FL (ref 80–99)
NON-HDL CHOLESTEROL, 011976: 92 MG/DL
PLATELET # BLD AUTO: 338 K/UL (ref 140–440)
PMV BLD AUTO: 9.6 FL (ref 9–13)
POTASSIUM SERPL-SCNC: 3.6 MMOL/L (ref 3.5–5.5)
PROT SERPL-MCNC: 7.3 G/DL (ref 6.4–8.3)
RBC # BLD AUTO: 4.73 M/UL (ref 3.8–5.2)
SODIUM SERPL-SCNC: 141 MMOL/L (ref 133–145)
TRIGL SERPL-MCNC: 60 MG/DL (ref 40–149)
TSH SERPL DL<=0.005 MIU/L-ACNC: 1.99 MCU/ML (ref 0.27–4.2)
VLDLC SERPL CALC-MCNC: 12 MG/DL (ref 8–30)
WBC # BLD AUTO: 7.9 K/UL (ref 4–11)

## 2022-01-25 ENCOUNTER — TELEPHONE (OUTPATIENT)
Dept: FAMILY MEDICINE CLINIC | Age: 52
End: 2022-01-25

## 2022-01-26 NOTE — TELEPHONE ENCOUNTER
Called patient and left message to please call office. Closing encounter as there is open results note.

## 2022-01-27 ENCOUNTER — TELEPHONE (OUTPATIENT)
Dept: FAMILY MEDICINE CLINIC | Age: 52
End: 2022-01-27

## 2022-01-27 NOTE — TELEPHONE ENCOUNTER
----- Message from Terry Orlando sent at 1/26/2022 11:37 AM EST -----  Subject: Results Request    QUESTIONS  Which lab or imaging result is the patient calling about? Lab work   Which provider ordered the test? Caio Rogers   At what location was the test performed? Date the test was performed? 2022-01-14  Additional Information for Provider? Pt has called back multiple times   about results. Pt is requesting that she get a call back for her test   results   ---------------------------------------------------------------------------  --------------  CALL BACK INFO  What is the best way for the office to contact you? OK to leave message on   voicemail  Preferred Call Back Phone Number?  [phone]

## 2022-02-01 ENCOUNTER — NURSE TRIAGE (OUTPATIENT)
Dept: OTHER | Facility: CLINIC | Age: 52
End: 2022-02-01
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
## 2022-02-17 ENCOUNTER — VIRTUAL VISIT (OUTPATIENT)
Dept: ORTHOPEDIC SURGERY | Age: 52
End: 2022-02-17
Payer: MEDICAID

## 2022-02-17 DIAGNOSIS — M54.50 LUMBAR PAIN: Primary | ICD-10-CM

## 2022-02-17 DIAGNOSIS — M54.16 LUMBAR NEURITIS: ICD-10-CM

## 2022-02-17 PROCEDURE — 99213 OFFICE O/P EST LOW 20 MIN: CPT | Performed by: PHYSICAL MEDICINE & REHABILITATION

## 2022-02-17 RX ORDER — IBUPROFEN 800 MG/1
800 TABLET ORAL
Qty: 45 TABLET | Refills: 0 | Status: SHIPPED | OUTPATIENT
Start: 2022-02-17 | End: 2022-06-02

## 2022-02-17 RX ORDER — METHYLPREDNISOLONE 4 MG/1
TABLET ORAL
Qty: 1 DOSE PACK | Refills: 0 | Status: SHIPPED | OUTPATIENT
Start: 2022-02-17 | End: 2022-05-03

## 2022-02-17 NOTE — LETTER
2/17/2022    Patient: Jose Reyes   YOB: 1970   Date of Visit: 2/17/2022     Stacy Zhang MD  [address] [facility]  [address]  [address] [place] [address]  [address]  [address] [place] [address]    Dear MD Petr Arias MD,      Thank you for referring Ms. Gaviota Sloan to [address] [facility] for evaluation. My notes for this consultation are attached. If you have questions, please do not hesitate to call me. I look forward to following your patient along with you.       Sincerely,    Hilda Pollard MD

## 2022-02-18 ENCOUNTER — TELEPHONE (OUTPATIENT)
Dept: ORTHOPEDIC SURGERY | Age: 52
End: 2022-02-18

## 2022-02-18 DIAGNOSIS — M54.16 LUMBAR NEURITIS: ICD-10-CM

## 2022-02-18 DIAGNOSIS — M54.50 LUMBAR PAIN: ICD-10-CM

## 2022-02-18 NOTE — TELEPHONE ENCOUNTER
Patient contacted and she just wanted to clarify the directions on how to take her MDP and Motrin. I told her to take the MDP first and then once she has completed that, she can start the motrin.

## 2022-02-18 NOTE — TELEPHONE ENCOUNTER
Patient called in to request call back to discuss details for plan of care. Patient states she had appointment on yesterday (2/17/22) and need to go over some details before she begins taking medication.     Phn #: [phone]

## 2022-03-02 ENCOUNTER — TELEPHONE (OUTPATIENT)
Dept: PHYSICAL THERAPY | Age: 52
End: 2022-03-02

## 2022-03-03 ENCOUNTER — APPOINTMENT (OUTPATIENT)
Dept: PHYSICAL THERAPY | Age: 52
End: 2022-03-03
Attending: PHYSICAL MEDICINE & REHABILITATION

## 2022-03-16 ENCOUNTER — APPOINTMENT (OUTPATIENT)
Dept: PHYSICAL THERAPY | Age: 52
End: 2022-03-16
Attending: PHYSICAL MEDICINE & REHABILITATION

## 2022-03-19 PROBLEM — R73.03 PREDIABETES: Status: ACTIVE | Noted: 2018-10-29

## 2022-03-19 PROBLEM — I10 ESSENTIAL HYPERTENSION: Status: ACTIVE | Noted: 2018-03-01

## 2022-03-19 PROBLEM — E66.01 MORBID OBESITY WITH BMI OF 45.0-49.9, ADULT (HCC): Status: ACTIVE | Noted: 2018-10-29

## 2022-03-21 ENCOUNTER — APPOINTMENT (OUTPATIENT)
Dept: PHYSICAL THERAPY | Age: 52
End: 2022-03-21
Attending: PHYSICAL MEDICINE & REHABILITATION

## 2022-04-07 ENCOUNTER — TELEPHONE (OUTPATIENT)
Dept: ORTHOPEDIC SURGERY | Age: 52
End: 2022-04-07

## 2022-04-21 ENCOUNTER — TELEPHONE (OUTPATIENT)
Dept: FAMILY MEDICINE CLINIC | Age: 52
End: 2022-04-21

## 2022-04-21 NOTE — TELEPHONE ENCOUNTER
Dr. Quang Newman, please see message below. Patient is requesting that you add an order to check her A1C. Pt is going this weekend to get her labs done for her appt on 5/3/2022. Please see message below. Thank you.

## 2022-04-21 NOTE — TELEPHONE ENCOUNTER
Pt went to an [facility] to check for a uti because she has been urinating a lot and they didn't find anything. But they told her to ask her PCP to order her an A1C lab to check her for diabetes. Pt is going this weekend to get her labs done for her appt on 5/3/2022 and would like to know if it could be added . Please advise.

## 2022-04-22 DIAGNOSIS — R73.01 IMPAIRED FASTING BLOOD SUGAR: Primary | ICD-10-CM

## 2022-04-22 DIAGNOSIS — R35.0 URINE FREQUENCY: ICD-10-CM

## 2022-04-22 NOTE — TELEPHONE ENCOUNTER
Done order for A1C. It was checked back on 9/2021. Was around 5.9 in prediabetic range.  Will recheck

## 2022-04-22 NOTE — TELEPHONE ENCOUNTER
I spoke with patient (two identifiers verified) to advise Dr. Mary Suárez has placed lab order for A1C. She was told to let lab  know A1C order is in system from Dr. Mary Suárez. Patient verbalized understanding.

## 2022-04-27 ENCOUNTER — HOSPITAL ENCOUNTER (OUTPATIENT)
Dept: LAB | Age: 52
Discharge: HOME | End: 2022-04-27

## 2022-04-27 LAB — SENTARA SPECIMEN COL,SENBCF: NORMAL

## 2022-04-27 PROCEDURE — 99001 SPECIMEN HANDLING PT-LAB: CPT

## 2022-04-28 LAB
AVG GLU, 10930: 117 MG/DL (ref 91–123)
HBA1C MFR BLD HPLC: 5.7 % (ref 4.8–5.6)

## 2022-05-03 ENCOUNTER — OFFICE VISIT (OUTPATIENT)
Dept: FAMILY MEDICINE CLINIC | Age: 52
End: 2022-05-03
Payer: MEDICAID

## 2022-05-03 ENCOUNTER — HOSPITAL ENCOUNTER (OUTPATIENT)
Dept: LAB | Age: 52
Discharge: HOME | End: 2022-05-03

## 2022-05-03 VITALS
TEMPERATURE: 97.1 F | WEIGHT: 271.6 LBS | DIASTOLIC BLOOD PRESSURE: 76 MMHG | OXYGEN SATURATION: 98 % | BODY MASS INDEX: 49.98 KG/M2 | RESPIRATION RATE: 16 BRPM | HEART RATE: 61 BPM | HEIGHT: 62 IN | SYSTOLIC BLOOD PRESSURE: 120 MMHG

## 2022-05-03 DIAGNOSIS — R35.0 URINARY FREQUENCY: ICD-10-CM

## 2022-05-03 DIAGNOSIS — R10.2 PELVIC PAIN: ICD-10-CM

## 2022-05-03 DIAGNOSIS — I10 ESSENTIAL HYPERTENSION: Primary | ICD-10-CM

## 2022-05-03 DIAGNOSIS — R09.81 SINUS CONGESTION: ICD-10-CM

## 2022-05-03 DIAGNOSIS — M79.89 FOOT SWELLING: ICD-10-CM

## 2022-05-03 DIAGNOSIS — R73.01 IMPAIRED FASTING BLOOD SUGAR: ICD-10-CM

## 2022-05-03 LAB
BILIRUB UR QL STRIP: NEGATIVE
GLUCOSE UR-MCNC: NEGATIVE MG/DL
KETONES P FAST UR STRIP-MCNC: NEGATIVE MG/DL
PH UR STRIP: 7 [PH] (ref 4.6–8)
PROT UR QL STRIP: NEGATIVE
SENTARA SPECIMEN COL,SENBCF: NORMAL
SP GR UR STRIP: 1.02 (ref 1–1.03)
UA UROBILINOGEN AMB POC: NORMAL (ref 0.2–1)
URINALYSIS CLARITY POC: NORMAL
URINALYSIS COLOR POC: YELLOW
URINE BLOOD POC: NEGATIVE
URINE LEUKOCYTES POC: NEGATIVE
URINE NITRITES POC: NEGATIVE

## 2022-05-03 PROCEDURE — 81003 URINALYSIS AUTO W/O SCOPE: CPT | Performed by: FAMILY MEDICINE

## 2022-05-03 PROCEDURE — 99214 OFFICE O/P EST MOD 30 MIN: CPT | Performed by: FAMILY MEDICINE

## 2022-05-03 PROCEDURE — 99001 SPECIMEN HANDLING PT-LAB: CPT

## 2022-05-03 RX ORDER — FLUTICASONE PROPIONATE 50 MCG
SPRAY, SUSPENSION (ML) NASAL
Qty: 16 G | Refills: 1 | Status: SHIPPED | OUTPATIENT
Start: 2022-05-03 | End: 2022-05-18 | Stop reason: SDUPTHER

## 2022-05-03 RX ORDER — HYDROCHLOROTHIAZIDE 12.5 MG/1
12.5 TABLET ORAL DAILY
Qty: 90 TABLET | Refills: 0 | Status: SHIPPED | OUTPATIENT
Start: 2022-05-03 | End: 2022-09-21 | Stop reason: ALTCHOICE

## 2022-05-03 RX ORDER — LISINOPRIL AND HYDROCHLOROTHIAZIDE 20; 25 MG/1; MG/1
1 TABLET ORAL DAILY
Qty: 90 TABLET | Refills: 1 | Status: SHIPPED | OUTPATIENT
Start: 2022-05-03 | End: 2022-05-03 | Stop reason: ALTCHOICE

## 2022-05-03 RX ORDER — METOPROLOL TARTRATE 100 MG/1
100 TABLET ORAL DAILY
Qty: 90 TABLET | Refills: 1 | Status: SHIPPED | OUTPATIENT
Start: 2022-05-03 | End: 2022-09-21 | Stop reason: SDUPTHER

## 2022-05-03 RX ORDER — ACETAMINOPHEN 500 MG
TABLET ORAL
Qty: 90 CAPSULE | Refills: 4 | Status: SHIPPED | OUTPATIENT
Start: 2022-05-03 | End: 2022-05-23 | Stop reason: SDUPTHER

## 2022-05-03 RX ORDER — LISINOPRIL 20 MG/1
20 TABLET ORAL DAILY
Qty: 90 TABLET | Refills: 0 | Status: SHIPPED | OUTPATIENT
Start: 2022-05-03 | End: 2022-09-21 | Stop reason: ALTCHOICE

## 2022-05-03 NOTE — PATIENT INSTRUCTIONS
A Healthy Lifestyle: Care Instructions  Your Care Instructions     A healthy lifestyle can help you feel good, stay at a healthy weight, and have plenty of energy for both work and play. A healthy lifestyle is something you can share with your whole family. A healthy lifestyle also can lower your risk for serious health problems, such as high blood pressure, heart disease, and diabetes. You can follow a few steps listed below to improve your health and the health of your family. Follow-up care is a key part of your treatment and safety. Be sure to make and go to all appointments, and call your doctor if you are having problems. It's also a good idea to know your test results and keep a list of the medicines you take. How can you care for yourself at home? · Do not eat too much sugar, fat, or fast foods. You can still have dessert and treats now and then. The goal is moderation. · Start small to improve your eating habits. Pay attention to portion sizes, drink less juice and soda pop, and eat more fruits and vegetables. ? Eat a healthy amount of food. A 3-ounce serving of meat, for example, is about the size of a deck of cards. Fill the rest of your plate with vegetables and whole grains. ? Limit the amount of soda and sports drinks you have every day. Drink more water when you are thirsty. ? Eat plenty of fruits and vegetables every day. Have an apple or some carrot sticks as an afternoon snack instead of a candy bar. Try to have fruits and/or vegetables at every meal.  · Make exercise part of your daily routine. You may want to start with simple activities, such as walking, bicycling, or slow swimming. Try to be active 30 to 60 minutes every day. You do not need to do all 30 to 60 minutes all at once. For example, you can exercise 3 times a day for 10 or 20 minutes.  Moderate exercise is safe for most people, but it is always a good idea to talk to your doctor before starting an exercise program.  · Keep moving. Tony Player the lawn, work in the garden, or [employer]. Take the stairs instead of the elevator at work. · If you smoke, quit. People who smoke have an increased risk for heart attack, stroke, cancer, and other lung illnesses. Quitting is hard, but there are ways to boost your chance of quitting tobacco for good. ? Use nicotine gum, patches, or lozenges. ? Ask your doctor about stop-smoking programs and medicines. ? Keep trying. In addition to reducing your risk of diseases in the future, you will notice some benefits soon after you stop using tobacco. If you have shortness of breath or asthma symptoms, they will likely get better within a few weeks after you quit. · Limit how much alcohol you drink. Moderate amounts of alcohol (up to 2 drinks a day for men, 1 drink a day for women) are okay. But drinking too much can lead to liver problems, high blood pressure, and other health problems. Family health  If you have a family, there are many things you can do together to improve your health. · Eat meals together as a family as often as possible. · Eat healthy foods. This includes fruits, vegetables, lean meats and dairy, and whole grains. · Include your family in your fitness plan. Most people think of activities such as jogging or tennis as the way to fitness, but there are many ways you and your family can be more active. Anything that makes you breathe hard and gets your heart pumping is exercise. Here are some tips:  ? Walk to do errands or to take your child to school or the bus.  ? Go for a family bike ride after dinner instead of watching TV. Where can you learn more? Go to http://www.gray.com/  Enter J319 in the search box to learn more about \"A Healthy Lifestyle: Care Instructions. \"  Current as of: June 16, 2021               Content Version: 13.2  © 7408-0823 [employer], [employer].    Care instructions adapted under license by [facility] (which disclaims liability or warranty for this information). If you have questions about a medical condition or this instruction, always ask your healthcare professional. [address] any warranty or liability for your use of this information. Low Sodium Diet (2,000 Milligram): Care Instructions  Overview     Limiting sodium can be an important part of managing some health problems. The most common source of sodium is salt. People get most of the salt in their diet from canned, prepared, and packaged foods. Fast food and restaurant meals also are very high in sodium. Your doctor will probably limit your sodium to less than 2,000 milligrams (mg) a day. This limit counts all the sodium in prepared and packaged foods and any salt you add to your food. Follow-up care is a key part of your treatment and safety. Be sure to make and go to all appointments, and call your doctor if you are having problems. It's also a good idea to know your test results and keep a list of the medicines you take. How can you care for yourself at home? Read food labels  · Read labels on cans and food packages. The labels tell you how much sodium is in each serving. Make sure that you look at the serving size. If you eat more than the serving size, you have eaten more sodium. · Food labels also tell you the Percent Daily Value for sodium. Choose products with low Percent Daily Values for sodium. · Be aware that sodium can come in forms other than salt, including monosodium glutamate (MSG), sodium citrate, and sodium bicarbonate (baking soda). MSG is often added to Asian food. When you eat out, you can sometimes ask for food without MSG or added salt. Buy low-sodium foods  · Buy foods that are labeled \"unsalted\" (no salt added), \"sodium-free\" (less than 5 mg of sodium per serving), or \"low-sodium\" (140 mg or less of sodium per serving).  Foods labeled \"reduced-sodium\" and \"light sodium\" may still have too much sodium. Be sure to read the label to see how much sodium you are getting. · Buy fresh vegetables, or frozen vegetables without added sauces. Buy low-sodium versions of canned vegetables, soups, and other canned goods. Prepare low-sodium meals  · Cut back on the amount of salt you use in cooking. This will help you adjust to the taste. Do not add salt after cooking. One teaspoon of salt has about 2,300 mg of sodium. · Take the salt shaker off the table. · Flavor your food with garlic, lemon juice, onion, vinegar, herbs, and spices. Do not use soy sauce, lite soy sauce, steak sauce, onion salt, garlic salt, celery salt, or ketchup on your food. · Use low-sodium salad dressings, sauces, and ketchup. Or make your own salad dressings and sauces without adding salt. · Use less salt (or none) when recipes call for it. You can often use half the salt a recipe calls for without losing flavor. Other foods such as rice, pasta, and grains do not need added salt. · Rinse canned vegetables, and cook them in fresh water. This removes some--but not all--of the salt. · Avoid water that is naturally high in sodium or that has been treated with water softeners, which add sodium. If you buy bottled water, read the label and choose a sodium-free brand. Avoid high-sodium foods  · Avoid eating:  ? Smoked, cured, salted, and canned meat, fish, and poultry. ? Ham, hudson, hot dogs, and luncheon meats. ? Regular, hard, and processed cheese and regular peanut butter. ? Crackers with salted tops, and other salted snack foods such as pretzels, chips, and salted popcorn. ? Frozen prepared meals, unless labeled low-sodium. ? Canned and dried soups, broths, and bouillon, unless labeled sodium-free or low-sodium. ? Canned vegetables, unless labeled sodium-free or low-sodium. ? [place] fries, pizza, tacos, and other fast foods.   ? Pickles, olives, ketchup, and other condiments, especially soy sauce, unless labeled sodium-free or low-sodium. Where can you learn more? Go to http://www.gray.com/  Enter V843 in the search box to learn more about \"Low Sodium Diet (2,000 Milligram): Care Instructions. \"  Current as of: September 8, 2021               Content Version: 13.2  © 9852-1302 [employer]. Care instructions adapted under license by [employer] (which disclaims liability or warranty for this information). If you have questions about a medical condition or this instruction, always ask your healthcare professional. [place] [address] any warranty or liability for your use of this information.

## 2022-05-03 NOTE — PROGRESS NOTES
1. Have you been to the ER, urgent care clinic since your last visit? Hospitalized since your last visit? [facility] for Urinary Frequency LOV: 4/21/22. 2. Have you seen or consulted any other health care providers outside of the [address] [place] [address] since your last visit? Include any pap smears or colon screening. [facility] LOV: last Wednesday for pelvic pressure.     Chief Complaint   Patient presents with    Hypertension    Blood sugar problem    Morbid Obesity    Urinary Frequency    Other     pelvic pressure - saw [employer] [facility] last wednesday    Foot Swelling     bilat foot swelling with right foot being worse

## 2022-05-03 NOTE — PROGRESS NOTES
HISTORY OF PRESENT ILLNESS  Luis Fernando Rodriguez is a 45 y.o. female. HPI: Here for follow up and also concern with urine frquency. Also having lower abdominal pain below suprapubic area. No burning or urgency. No nausea or vomiting. No back pain. Went to OB/GYN and had a vaginal culture done. She was treated with antifungal medication. Recent A1c is 5.7. She is also on hydrochlorothiazide. On and off for swelling currently no pitting edema. Discussed to cut down the hydrochlorothiazide dose. She agrees with it. Vitals been stable. History of hypertension. Asymptomatic. Blood pressure been fairly stable. Denies any headache, dizziness, no chest pain or trouble breathing, no arm or leg weakness. No nausea or vomiting, no weight or appetite changes, no mood changes . No  bowel complains, no palpitation, no diaphoresis. No abdominal pain. No cold or cough. No fever. No sleep trouble. Discussed high BMI. Discussed importance of weight loss. She is working on it. Working on diet modification and exercise as tolerated. Visit Vitals  /76 (BP 1 Location: Left upper arm, BP Patient Position: Sitting, BP Cuff Size: Adult)   Pulse 61   Temp 97.1 °F (36.2 °C) (Temporal)   Resp 16   Ht 5' 2\" (1.575 m)   Wt 271 lb 9.6 oz (123.2 kg)   SpO2 98%   BMI 49.68 kg/m²     Review medication list, vitals, problem list,allergies.    Lab Results   Component Value Date/Time    WBC 7.9 01/19/2022 04:00 PM    HGB 13.7 01/19/2022 04:00 PM    HCT 42.1 01/19/2022 04:00 PM    PLATELET 263 71/68/5618 04:00 PM    MCV 89 01/19/2022 04:00 PM     Lab Results   Component Value Date/Time    Sodium 141 01/19/2022 04:00 PM    Potassium 3.6 01/19/2022 04:00 PM    Chloride 102 01/19/2022 04:00 PM    CO2 29 01/19/2022 04:00 PM    Anion gap 10.0 01/19/2022 04:00 PM    Glucose 72 01/19/2022 04:00 PM    BUN 11 01/19/2022 04:00 PM    Creatinine 0.8 01/19/2022 04:00 PM    BUN/Creatinine ratio 14 04/16/2015 01:43 AM    GFR est AA >60 04/16/2015 01:43 AM    GFR est non-AA >60 04/16/2015 01:43 AM    Calcium 9.6 01/19/2022 04:00 PM    Bilirubin, total 0.5 01/19/2022 04:00 PM    Alk.  phosphatase 75 01/19/2022 04:00 PM    Protein, total 7.3 01/19/2022 04:00 PM    Albumin 3.9 01/19/2022 04:00 PM    Globulin 3.4 01/19/2022 04:00 PM    A-G Ratio 1.1 01/19/2022 04:00 PM    ALT (SGPT) 22 01/19/2022 04:00 PM    AST (SGOT) 19 01/19/2022 04:00 PM     Lab Results   Component Value Date/Time    Cholesterol, total 141 01/19/2022 04:00 PM    HDL Cholesterol 49 01/19/2022 04:00 PM    LDL, calculated 80 01/19/2022 04:00 PM    VLDL, calculated 12 01/19/2022 04:00 PM    Triglyceride 60 01/19/2022 04:00 PM    CHOL/HDL Ratio 2.7 06/15/2010 12:20 PM     Lab Results   Component Value Date/Time    TSH 1.99 01/19/2022 04:00 PM     Lab Results   Component Value Date/Time    Hemoglobin A1c 5.7 (H) 04/27/2022 11:01 AM    Hemoglobin A1c (POC) 5.9 09/08/2021 09:32 AM     Lab Results   Component Value Date/Time    VITAMIN D, 25-HYDROXY 26.6 (L) 11/13/2018 08:15 AM       Results for orders placed or performed in visit on 09/08/21   AMB POC URINALYSIS DIP STICK AUTO W/O MICRO     Status: Normal   Result Value Ref Range Status    Color (UA POC) Yellow  Final    Clarity (UA POC) Clear  Final    Glucose (UA POC) Negative Negative Final    Bilirubin (UA POC) Negative Negative Final    Ketones (UA POC) Negative Negative Final    Specific gravity (UA POC) 1.020 1.001 - 1.035 Final    Blood (UA POC) Negative Negative Final    pH (UA POC) 6.0 4.6 - 8.0 Final    Protein (UA POC) Negative Negative Final    Urobilinogen (UA POC) 0.2 mg/dL 0.2 - 1 Final    Nitrites (UA POC) Negative Negative Final    Leukocyte esterase (UA POC) Negative Negative Final   Results for orders placed or performed in visit on 07/24/18   AMB POC URINALYSIS DIP STICK AUTO W/ MICRO     Status: None   Result Value Ref Range Status    Color (UA POC) Yellow  Final    Clarity (UA POC) Clear  Final    Glucose (UA POC) Negative Negative Final    Bilirubin (UA POC) Negative Negative Final    Ketones (UA POC) Negative Negative Final    Specific gravity (UA POC) 1.015 1.001 - 1.035 Final    Blood (UA POC) Negative Negative Final    pH (UA POC) 6.5 4.6 - 8.0 Final    Protein (UA POC) Negative Negative Final    Urobilinogen (UA POC) 0.2 mg/dL 0.2 - 1 Final    Nitrites (UA POC) Negative Negative Final    Leukocyte esterase (UA POC) Negative Negative Final   Results for orders placed or performed in visit on 01/11/12   AMB POC URINALYSIS DIP STICK MANUAL W/O MICRO     Status: None   Result Value Ref Range Status    Color (UA POC) Yellow      Clarity (UA POC) Clear      Glucose (UA POC) Negative Negative     Bilirubin (UA POC) Negative Negative     Ketones (UA POC) Negative Negative     Specific gravity (UA POC) 1.030 1.001 - 1.035     Blood (UA POC) Negative Negative     pH (UA POC) 5.5 4.6 - 8.0     Protein (UA POC) negative Negative mg/dL     Urobilinogen (UA POC) 0.2 mg/dL      Nitrites (UA POC) Negative Negative     Leukocyte esterase (UA POC) Negative Negative        ROS: See HPI    Physical Exam  Constitutional:       General: She is not in acute distress. Cardiovascular:      Rate and Rhythm: Normal rate and regular rhythm. Heart sounds: Normal heart sounds. Abdominal:      General: Bowel sounds are normal.      Palpations: Abdomen is soft. Tenderness: There is no abdominal tenderness. Musculoskeletal:         General: No swelling. Cervical back: Neck supple. Neurological:      Mental Status: She is oriented to person, place, and time. Psychiatric:         Behavior: Behavior normal.         ASSESSMENT and PLAN    ICD-10-CM ICD-9-CM    1. Essential hypertension: Blood pressure been stable. Decrease HCTZ component due to urine frequency.  the combination pill. Patient is aware. Low-salt diet.   Follow-up next visit I10 401.9 DISCONTINUED: lisinopril-hydroCHLOROthiazide (PRINZIDE, ZESTORETIC) 20-25 mg per tablet   2. Sinus congestion: Symptomatic treatment for seasonal allergies R09.81 478.19 fluticasone propionate (FLONASE) 50 mcg/actuation nasal spray   3. Urinary frequency: UA is negative for infection. At this time decreased density in symptoms. Follow-up next visit. Sending urine for culture R35.0 788.41 AMB POC URINALYSIS DIP STICK AUTO W/O MICRO      CULTURE, URINE   4. Impaired fasting blood sugar: Discussed lifestyle and diet modification and importance of weight loss. She is working on it R73.01 790.21    5. Foot swelling: No pitting edema at this time. Advised leg elevation and low-salt diet M79.89 729.81    6. Pelvic pain: At this time was treated for yeast infection and culture is pending R10.2 Brayan Santa     went to [place]. given yeast infection medication and culture pending. 7. BMI 45.0-49.9, adult [facility]): Discussed importance of diet and lifestyle modification Z68.42 V85.42    Patient understood agreed with the plan  Reviewed health maintenance  Given contact information for mammogram per schedule and appointment with GI to schedule for screening  She wants to hold off on due immunization and shingle vaccine she will take it from the pharmacy  when she decides to take it  Follow-up and Dispositions    · Return in about 3 months (around 8/3/2022). Please note that this dictation was completed with [employer], the computer voice recognition software. Quite often unanticipated grammatical, syntax, homophones, and other interpretive errors are inadvertently transcribed by the computer software. Please disregard these errors. Please excuse any errors that have escaped final proofreading.

## 2022-05-05 LAB — RESULT: NORMAL

## 2022-05-14 NOTE — PROGRESS NOTES
Patient identifiers verified. Patient advised that A1C in prediabetic range. Diet modification. She voices understanding.

## 2022-05-18 DIAGNOSIS — R09.82 POST-NASAL DRIP: ICD-10-CM

## 2022-05-18 DIAGNOSIS — J30.2 SEASONAL ALLERGIC RHINITIS, UNSPECIFIED TRIGGER: ICD-10-CM

## 2022-05-18 DIAGNOSIS — R21 RASH: ICD-10-CM

## 2022-05-18 DIAGNOSIS — R09.81 SINUS CONGESTION: ICD-10-CM

## 2022-05-18 RX ORDER — CLOTRIMAZOLE AND BETAMETHASONE DIPROPIONATE 10; .64 MG/G; MG/G
CREAM TOPICAL
Qty: 30 G | Refills: 0 | Status: SHIPPED | OUTPATIENT
Start: 2022-05-18 | End: 2022-09-21 | Stop reason: SDUPTHER

## 2022-05-18 RX ORDER — MONTELUKAST SODIUM 10 MG/1
10 TABLET ORAL DAILY
Qty: 90 TABLET | Refills: 1 | Status: SHIPPED | OUTPATIENT
Start: 2022-05-18 | End: 2022-08-02 | Stop reason: ALTCHOICE

## 2022-05-18 RX ORDER — FLUTICASONE PROPIONATE 50 MCG
SPRAY, SUSPENSION (ML) NASAL
Qty: 16 G | Refills: 1 | Status: SHIPPED | OUTPATIENT
Start: 2022-05-18 | End: 2022-08-02 | Stop reason: ALTCHOICE

## 2022-05-18 RX ORDER — LORATADINE 10 MG/1
10 TABLET ORAL
Qty: 90 TABLET | Refills: 1 | Status: SHIPPED | OUTPATIENT
Start: 2022-05-18 | End: 2022-08-02 | Stop reason: ALTCHOICE

## 2022-05-18 NOTE — TELEPHONE ENCOUNTER
This patient contacted office for the following prescriptions to be filled:    Medication requested :   Requested Prescriptions     Pending Prescriptions Disp Refills    montelukast (SINGULAIR) 10 mg tablet 30 Tablet 1     Sig: Take 1 Tablet by mouth daily.  loratadine (Claritin) 10 mg tablet 90 Tablet 1     Sig: Take 1 Tablet by mouth daily as needed for Allergies.     fluticasone propionate (FLONASE) 50 mcg/actuation nasal spray 16 g 1     Sig: INSTILL 1 SPRAY EACH NOSTRIL DAILY AS NEEDED FOR RHINITIS    clotrimazole-betamethasone (LOTRISONE) topical cream 30 g 0     Sig: Apply twice daily over affected area     [facility] or Print: [facility]   Mail order or Local pharmacy [address]     Scheduled appointment if not seen by current providers in office:  LOV 5/3/2022 f/u 8/3/2022

## 2022-05-23 ENCOUNTER — VIRTUAL VISIT (OUTPATIENT)
Dept: FAMILY MEDICINE CLINIC | Age: 52
End: 2022-05-23
Payer: MEDICAID

## 2022-05-23 DIAGNOSIS — J02.9 SORE THROAT: ICD-10-CM

## 2022-05-23 DIAGNOSIS — R05.9 COUGH: Primary | ICD-10-CM

## 2022-05-23 DIAGNOSIS — R09.89 CHEST CONGESTION: ICD-10-CM

## 2022-05-23 PROCEDURE — 99213 OFFICE O/P EST LOW 20 MIN: CPT | Performed by: FAMILY MEDICINE

## 2022-05-23 RX ORDER — ALBUTEROL SULFATE 90 UG/1
2 AEROSOL, METERED RESPIRATORY (INHALATION)
Qty: 18 G | Refills: 0 | Status: SHIPPED | OUTPATIENT
Start: 2022-05-23 | End: 2022-08-02 | Stop reason: ALTCHOICE

## 2022-05-23 RX ORDER — AZITHROMYCIN 250 MG/1
TABLET, FILM COATED ORAL
Qty: 6 TABLET | Refills: 0 | Status: SHIPPED | OUTPATIENT
Start: 2022-05-23 | End: 2022-05-28

## 2022-05-23 RX ORDER — ACETAMINOPHEN 500 MG
TABLET ORAL
Qty: 90 CAPSULE | Refills: 4 | Status: SHIPPED | OUTPATIENT
Start: 2022-05-23 | End: 2022-10-01

## 2022-05-23 NOTE — PROGRESS NOTES
Crissy Guan is a 45 y.o. female who was seen by synchronous (real-time) audio-video technology on 5/23/2022 for Cough (productive x yellow mucus x 5 days), Fever (fever last week), Nasal Discharge (yellow mucus x 5 days), Sore Throat (pain level 7), and Other (pain across the eyes - pain level 7)        Assessment & Plan:   71.ass  Diagnoses and all orders for this visit:    Cough: Yellow sputum, postnasal drip, chest congestion, sore throat, fever once last week. Negative COVID test and strep test at [facility] urgent care. Currently she does not have fever. Having yellow sputum and cough and nasal discharge. No sinus tenderness. Giving azithromycin and albuterol as needed. Continue Singulair and Flovent as needed. Follow-up in 1 week if symptoms persist.  Educated her that dry cough can left behind as a postviral bronchitis. She can take albuterol as needed. Can use cough drops as needed    Chest congestion    Sore throat    Other orders  -     cholecalciferol (D3-2000) (2,000 UNITS /50 MCG) cap capsule; TAKE 1 CAPSULE BY MOUTH DAILY, Normal, Disp-90 Capsule, R-4  -     azithromycin (ZITHROMAX) 250 mg tablet; Take 2 tablets today, then take 1 tablet daily, Normal, Disp-6 Tablet, R-0  -     albuterol (PROVENTIL HFA, VENTOLIN HFA, PROAIR HFA) 90 mcg/actuation inhaler; Take 2 Puffs by inhalation every six (6) hours as needed for Wheezing., Normal, Disp-18 g, R-0    Patient understood agreed with the plan  Follow-up in 1 week if symptoms persist    Subjective:   Done visit with doxy. Done consent. Complaining of cough with yellow sputum, chest congestion, postnasal drip, nasal discharge, sore throat since last week. She had a fever 101 last week. Went to [facility] urgent care and had a PCR and rapid COVID test, strep test which were negative. She was told she does not have flu. At this time she does not have a fever but chest congestion, cough, postnasal drip has been persistent.   Has been taking OTC cold medication and the Flovent and Singulair but not helping much. Sitting comfortable without any acute distress. Able to talk in full sentence. Not using any extra respiratory muscle. No nausea vomiting or abdominal pain. No urinary or bowel complaint. No weight or appetite change. No sick contact. She has taken COVID vaccines. No headache or dizziness. No chest pain or palpitation or diaphoresis. Prior to Admission medications    Medication Sig Start Date End Date Taking? Authorizing Provider   cholecalciferol (D3-2000) (2,000 UNITS /50 MCG) cap capsule TAKE 1 CAPSULE BY MOUTH DAILY 5/23/22  Yes Estela Warner MD   azithromycin [facility]) 250 mg tablet Take 2 tablets today, then take 1 tablet daily 5/23/22 5/28/22 Yes Estela Warner MD   albuterol (PROVENTIL HFA, VENTOLIN HFA, PROAIR HFA) 90 mcg/actuation inhaler Take 2 Puffs by inhalation every six (6) hours as needed for Wheezing. 5/23/22  Yes Estela Warner MD   montelukast (SINGULAIR) 10 mg tablet Take 1 Tablet by mouth daily. 5/18/22  Yes Estela Warner MD   loratadine (Claritin) 10 mg tablet Take 1 Tablet by mouth daily as needed for Allergies. 5/18/22  Yes Estela Warner MD   fluticasone propionate (FLONASE) 50 mcg/actuation nasal spray INSTILL 1 SPRAY EACH NOSTRIL DAILY AS NEEDED FOR RHINITIS 5/18/22  Yes Estela Warner MD   clotrimazole-betamethasone (LOTRISONE) topical cream Apply twice daily over affected area 5/18/22  Yes Estela Warner MD   metoprolol tartrate (LOPRESSOR) 100 mg IR tablet Take 1 Tablet by mouth daily. 5/3/22  Yes Estela Warner MD   lisinopriL (PRINIVIL, ZESTRIL) 20 mg tablet Take 1 Tablet by mouth daily. 5/3/22  Yes Estela Warner MD   hydroCHLOROthiazide (HYDRODIURIL) 12.5 mg tablet Take 1 Tablet by mouth daily. 5/3/22  Yes Estela Warner MD   ibuprofen (MOTRIN) 800 mg tablet Take 1 Tablet by mouth three (3) times daily (with meals).  2/17/22  Yes Jonas Velasquez MD   ibuprofen (MOTRIN) 800 mg tablet TAKE 1 TABLET BY MOUTH EVERY 8 HOURS AS NEEDED FOR PAIN 1/7/22  Yes Tiffanie Angelo MD   multivitamin (ONE A DAY) tablet Take 1 Tab by mouth daily. Yes Provider, Historical   famotidine (PEPCID) 20 mg tablet Take 1 Tab by mouth two (2) times a day. Patient taking differently: Take 20 mg by mouth daily as needed. 2/2/21  Yes Tiffanie Angelo MD   cholecalciferol (D3-2000) (2,000 UNITS /50 MCG) cap capsule TAKE 1 CAPSULE BY MOUTH DAILY 5/3/22 5/23/22  Tiffanie Angelo MD         ROS    Objective:     Patient-Reported Vitals 8/10/2020   Patient-Reported LMP march 2020      General: alert, cooperative, no distress   Mental  status: normal mood, behavior, speech, dress, motor activity, and thought processes, able to follow commands   HENT: NCAT   Neck: no visualized mass   Resp: no respiratory distress   Neuro: no gross deficits   Skin: no discoloration or lesions of concern on visible areas   Psychiatric: normal affect, consistent with stated mood, no evidence of hallucinations     Additional exam findings: We discussed the expected course, resolution and complications of the diagnosis(es) in detail. Medication risks, benefits, costs, interactions, and alternatives were discussed as indicated. I advised her to contact the office if her condition worsens, changes or fails to improve as anticipated. She expressed understanding with the diagnosis(es) and plan. Charlotte Donaldson, was evaluated through a synchronous (real-time) audio-video encounter. The patient (or guardian if applicable) is aware that this is a billable service, which includes applicable co-pays. Verbal consent to proceed has been obtained. The visit was conducted pursuant to the emergency declaration under the [facility], [address] [place] [address] authority and the [employer] General Act.   Patient identification was verified, and a caregiver was present when appropriate. The patient was located at home in a state where the provider was licensed to provide care.     Baltazar Guadalupe MD

## 2022-05-23 NOTE — PROGRESS NOTES
1. Have you been to the ER, urgent care clinic since your last visit? Hospitalized since your last visit? [facility] [address]  5/19/2022 and [facility] 5/18/2022     2. Have you seen or consulted any other health care providers outside of the [address] [place] [address] since your last visit? Include any pap smears or colon screening.  No    Chief Complaint   Patient presents with    Cough     productive x yellow mucus x 5 days    Fever     fever last week    Nasal Discharge     yellow mucus x 5 days    Sore Throat     pain level 7    Other     pain across the eyes - pain level 7

## 2022-06-02 ENCOUNTER — OFFICE VISIT (OUTPATIENT)
Dept: ORTHOPEDIC SURGERY | Age: 52
End: 2022-06-02
Payer: MEDICAID

## 2022-06-02 VITALS — WEIGHT: 271 LBS | TEMPERATURE: 97.8 F | BODY MASS INDEX: 49.57 KG/M2 | RESPIRATION RATE: 17 BRPM

## 2022-06-02 DIAGNOSIS — M54.41 CHRONIC BILATERAL LOW BACK PAIN WITH BILATERAL SCIATICA: Primary | ICD-10-CM

## 2022-06-02 DIAGNOSIS — M43.16 SPONDYLOLISTHESIS AT L4-L5 LEVEL: ICD-10-CM

## 2022-06-02 DIAGNOSIS — M79.18 MYOFASCIAL PAIN: ICD-10-CM

## 2022-06-02 DIAGNOSIS — M54.42 CHRONIC BILATERAL LOW BACK PAIN WITH BILATERAL SCIATICA: Primary | ICD-10-CM

## 2022-06-02 DIAGNOSIS — G89.29 CHRONIC BILATERAL LOW BACK PAIN WITH BILATERAL SCIATICA: Primary | ICD-10-CM

## 2022-06-02 PROCEDURE — 99214 OFFICE O/P EST MOD 30 MIN: CPT | Performed by: NURSE PRACTITIONER

## 2022-06-02 PROCEDURE — 72100 X-RAY EXAM L-S SPINE 2/3 VWS: CPT | Performed by: NURSE PRACTITIONER

## 2022-06-02 RX ORDER — METHOCARBAMOL 500 MG/1
500 TABLET, FILM COATED ORAL
Qty: 90 TABLET | Refills: 1 | Status: SHIPPED | OUTPATIENT
Start: 2022-06-02 | End: 2022-08-02 | Stop reason: ALTCHOICE

## 2022-06-02 RX ORDER — ESTRADIOL 0.1 MG/G
CREAM VAGINAL
COMMUNITY
Start: 2022-04-28 | End: 2022-08-02 | Stop reason: ALTCHOICE

## 2022-06-02 RX ORDER — CYCLOBENZAPRINE HCL 10 MG
TABLET ORAL
COMMUNITY
Start: 2022-05-28 | End: 2022-08-11

## 2022-06-02 RX ORDER — IBUPROFEN 800 MG/1
800 TABLET ORAL
Qty: 90 TABLET | Refills: 1 | Status: SHIPPED | OUTPATIENT
Start: 2022-06-02 | End: 2022-08-02 | Stop reason: ALTCHOICE

## 2022-06-02 NOTE — PROGRESS NOTES
Chief complaint   Chief Complaint   Patient presents with    Back Pain       History of Present Illness:  Mariah Thibodeaux is a  45 y.o.  female who comes in today for follow-up of her chronic low back pain. She states that today she gets pain that radiates into her bilateral buttocks. Previously radiating to her left lower extremity down to her foot. She states last Friday she had a big flare of back pain that radiated into her right buttock and leg down to her foot. She went to [facility] urgent care where they gave her Flexeril 10 mg but she can only take it at night because it makes her drowsy and ibuprofen 800 mg which she was taking twice a day with food. She states this did help and she has been off of the ibuprofen for 2 days now. She states now she just has the back pain that radiates into the bilateral buttocks. Last visit she was given a Medrol Dosepak and she did get it filled but she lost it in a move. She is now living over near [facility]. She was supposed to get a x-ray at the last visit but never made it over to the facility to get it done. She was ordered physical therapy but she states they never called her to set it up. She would like to go ahead and do the physical therapy and have it reset up. She works as a hospice nurse. She is a non-smoker. She denies fever bowel bladder dysfunction      Physical Exam:. The patient is a 67-year-old female well-developed well-nourished who is alert and oriented with a normal mood and affect. She has a full weightbearing nonantalgic gait. She denies any assist device. She has 4 out of 5 strength bilateral lower extremities. Negative straight leg raise. She has mild pain with hyperextension lumbar spine. Assessment and Plan: This is a patient who has back pain with bilateral buttock pain now. We did do a lumbar AP and lateral x-ray. It does show a spondylolisthesis at L4-5. I will reorder the physical therapy.   I have also given her ibuprofen and we will try Robaxin as a muscle relaxer. Hopefully will not make her sleepy. We will see her back in 2 months sooner if needed. XRAY: 06/02/22   body part: Lumbar  side (rt/lt): bilateral  number of views taken:2  Findings: L4-5 spondylolisthesis    The x-ray will be officially read by Dr. Mary Anne Abrams and scanned into the chart. Medications:  Current Outpatient Medications   Medication Sig Dispense Refill    cyclobenzaprine (FLEXERIL) 10 mg tablet TAKE 1 TABLET BY MOUTH THREE TIMES A DAY AS NEEDED FOR MUSCLE SPASMS MAY CAUSE DROWSINESS      estradioL (ESTRACE) 0.01 % (0.1 mg/gram) vaginal cream INSERT ONE APPLICATORFUL VAGINALLY AT BEDTIME TWICE WEEKLY      ibuprofen (MOTRIN) 800 mg tablet Take 1 Tablet by mouth every eight (8) hours as needed for Pain. With food 90 Tablet 1    methocarbamoL (ROBAXIN) 500 mg tablet Take 1 Tablet by mouth three (3) times daily as needed for Muscle Spasm(s). Indications: muscle spasm 90 Tablet 1    cholecalciferol (D3-2000) (2,000 UNITS /50 MCG) cap capsule TAKE 1 CAPSULE BY MOUTH DAILY 90 Capsule 4    albuterol (PROVENTIL HFA, VENTOLIN HFA, PROAIR HFA) 90 mcg/actuation inhaler Take 2 Puffs by inhalation every six (6) hours as needed for Wheezing. 18 g 0    montelukast (SINGULAIR) 10 mg tablet Take 1 Tablet by mouth daily. 90 Tablet 1    loratadine (Claritin) 10 mg tablet Take 1 Tablet by mouth daily as needed for Allergies. 90 Tablet 1    fluticasone propionate (FLONASE) 50 mcg/actuation nasal spray INSTILL 1 SPRAY EACH NOSTRIL DAILY AS NEEDED FOR RHINITIS 16 g 1    clotrimazole-betamethasone (LOTRISONE) topical cream Apply twice daily over affected area 30 g 0    metoprolol tartrate (LOPRESSOR) 100 mg IR tablet Take 1 Tablet by mouth daily. 90 Tablet 1    lisinopriL (PRINIVIL, ZESTRIL) 20 mg tablet Take 1 Tablet by mouth daily. 90 Tablet 0    hydroCHLOROthiazide (HYDRODIURIL) 12.5 mg tablet Take 1 Tablet by mouth daily.  90 Tablet 0    ibuprofen (MOTRIN) 800 mg tablet TAKE 1 TABLET BY MOUTH EVERY 8 HOURS AS NEEDED FOR PAIN 90 Tablet 0    multivitamin (ONE A DAY) tablet Take 1 Tab by mouth daily.  famotidine (PEPCID) 20 mg tablet Take 1 Tab by mouth two (2) times a day. (Patient taking differently: Take 20 mg by mouth daily as needed.) 60 Tab 1           Review of systems:    Past Medical History:   Diagnosis Date    Acid reflux     Acute cystitis with hematuria     GERD (gastroesophageal reflux disease)     Heel spur     HTN (hypertension), benign 2009    Menorrhagia     Plantar fasciitis     Shoulder bursitis     Urticaria     Vulvovaginitis      Past Surgical History:   Procedure Laterality Date    HX CHOLECYSTECTOMY      HX GYN  2003        HX GYN  2005    BTL     Social History     Socioeconomic History    Marital status: SINGLE     Spouse name: Not on file    Number of children: Not on file    Years of education: Not on file    Highest education level: Not on file   Occupational History    Not on file   Tobacco Use    Smoking status: Never Smoker    Smokeless tobacco: Never Used   Vaping Use    Vaping Use: Never used   Substance and Sexual Activity    Alcohol use: Yes     Comment: rare    Drug use: Never    Sexual activity: Not Currently     Partners: Male     Birth control/protection: Surgical     Comment: tubal ligation   Other Topics Concern    Not on file   Social History Narrative    Not on file     Social Determinants of Health     Financial Resource Strain:     Difficulty of Paying Living Expenses: Not on file   Food Insecurity:     Worried About Running Out of Food in the Last Year: Not on file    Alessio of Food in the Last Year: Not on file   Transportation Needs:     Lack of Transportation (Medical): Not on file    Lack of Transportation (Non-Medical):  Not on file   Physical Activity:     Days of Exercise per Week: Not on file    Minutes of Exercise per Session: Not on file   Stress:     Feeling of Stress : Not on file   Social Connections:     Frequency of Communication with Friends and Family: Not on file    Frequency of Social Gatherings with Friends and Family: Not on file    Attends Taoism Services: Not on file    Active Member of Clubs or Organizations: Not on file    Attends Club or Organization Meetings: Not on file    Marital Status: Not on file   Intimate Partner Violence:     Fear of Current or Ex-Partner: Not on file    Emotionally Abused: Not on file    Physically Abused: Not on file    Sexually Abused: Not on file   Housing Stability:     Unable to Pay for Housing in the Last Year: Not on file    Number of [place] in the Last Year: Not on file    Unstable Housing in the Last Year: Not on file     Family History   Problem Relation Age of Onset    Cancer Mother     Diabetes Mother     Heart Failure Mother     Arthritis-rheumatoid Mother     Stroke Father     Hypertension Father     Substance Abuse Father         tobacco use    Alzheimer's Disease Father     Cancer Brother     Diabetes Brother     Drug Abuse Brother     Alcohol abuse Maternal Grandfather     Attention Deficit Disorder Daughter     Heart Attack Maternal Aunt        Physical Exam:  Visit Vitals  Temp 97.8 °F (36.6 °C)   Resp 17   Wt 271 lb (122.9 kg)   BMI 49.57 kg/m²     Pain Scale: 5/10       has been . reviewed and is appropriate          Diagnoses and all orders for this visit:    1. Chronic bilateral low back pain with bilateral sciatica  -     AMB POC XRAY, SPINE, LUMBOSACRAL; 2 O  -     ibuprofen (MOTRIN) 800 mg tablet; Take 1 Tablet by mouth every eight (8) hours as needed for Pain. With food  -     REFERRAL TO PHYSICAL THERAPY    2. Myofascial pain  -     methocarbamoL (ROBAXIN) 500 mg tablet; Take 1 Tablet by mouth three (3) times daily as needed for Muscle Spasm(s).  Indications: muscle spasm  -     REFERRAL TO PHYSICAL THERAPY    3. Spondylolisthesis at L4-L5 level  - REFERRAL TO PHYSICAL THERAPY            Follow-up and Dispositions    · Return in about 2 months (around 8/2/2022) for with Dr Angie Canchola. Follow-up and Disposition History             We have informed Mariah Thibodeaux to notify us for immediate appointment if she has any worsening neurogical symptoms or if an emergency situation presents, then call [phone]    Please note that this dictation was completed with [employer], the computer voice recognition software. Quite often unanticipated grammatical, syntax, homophones, and other interpretive errors are inadvertently transcribed by the computer software. Please disregard these errors. Please excuse any errors that have escaped final proofreading.

## 2022-06-08 ENCOUNTER — APPOINTMENT (OUTPATIENT)
Dept: PHYSICAL THERAPY | Age: 52
End: 2022-06-08
Attending: NURSE PRACTITIONER

## 2022-06-13 ENCOUNTER — HOSPITAL ENCOUNTER (OUTPATIENT)
Dept: PHYSICAL THERAPY | Age: 52
Discharge: HOME | End: 2022-06-13
Attending: NURSE PRACTITIONER
Payer: MEDICAID

## 2022-06-13 ENCOUNTER — TELEPHONE (OUTPATIENT)
Dept: ORTHOPEDIC SURGERY | Age: 52
End: 2022-06-13

## 2022-06-13 PROCEDURE — 97161 PT EVAL LOW COMPLEX 20 MIN: CPT

## 2022-06-13 NOTE — PROGRESS NOTES
[facility] at [address] [place] [address] [address]  Phone: [phone]      Fax:  [phone]    Plan of Care/ Statement of Necessity for Physical Therapy Services  Patient name: Octavia Harry Start of Care: 2022   Referral source: Laquita Gan NP : 1970    Medical Diagnosis: Other low back pain [M54.59]  Bilateral sciatica [M54.31, M54.32]  Payor: Ok Kayser / Plan: Ban Palacios / Product Type: Managed Care Medicaid /  Onset Date: On or about 22    Treatment Diagnosis: Low Back Pain   Prior Hospitalization: see medical history Provider#: [id number]   Medications: Verified on Patient summary List   Comorbidities: Asthma, Hearing Impaired  Prior Level of Function: Able to walk stairs without use of hand rails. Able to do all usual work without pain. Able to stand more than 8 hrs and work or do house work with no trouble         The Plan of Care and following information is based on the information from the initial evaluation. Assessment/ key information: Patient is a 45 y.o. female referred to PT with the above Dx. Patient seen today for c/o (B) LBP and sciatica for the past two weeks with reason unknown or Possibly due to adjusting/moving patients at work. Pain in low to mid back with standing, lifting, walking and working for more than two hrs.       Patient presents to PT with an impaired gait, decreased balance, decreased strength, decreased flexibility, and decreased mobility of the low back and pelvis. She has a (+) left slump test and painful palpation of (B) superior glute and (B) Lx/Tx paraspinals. .  Patient s/s appear to be consistent w/ diagnosis. Patient demonstrates the potential to make functional gains within a reasonable time frame. Patient will benefit from skilled PT to address impairments and improve functional mobility, strength, gait and balance for an improved quality of life.   Fall Risk Assessment: Patient demonstrates no Fall Risk       Evaluation Complexity History HIGH Complexity :3+ comorbidities / personal factors will impact the outcome/ POC ; Examination MEDIUM Complexity : 3 Standardized tests and measures addressing body structure, function, activity limitation and / or participation in recreation  ;Presentation LOW Complexity : Stable, uncomplicated  ;Clinical Decision Making MEDIUM Complexity : FOTO score of 26-74  Overall Complexity Rating: LOW   Problem List: pain affecting function, decrease ROM, decrease strength, impaired gait/ balance, decrease ADL/ functional abilitiies, decrease activity tolerance, decrease flexibility/ joint mobility, decrease transfer abilities and other FOTO = 43   Treatment Plan may include any combination of the following: Therapeutic exercise, Therapeutic activities, Neuromuscular re-education, Physical agent/modality, Gait/balance training, Manual therapy, Patient education, Self Care training, Functional mobility training, Home safety training and Stair training  Patient / Family readiness to learn indicated by: asking questions, trying to perform skills and interest  Persons(s) to be included in education: patient (P)  Barriers to Learning/Limitations: yes;  sensory deficits-vision/hearing/speech  Measures taken:  Speak clearly while facing patient. Ensure written and verbal communication is understood by patient    Patient Goal (s):  Get back to at least 80%, Be able to lift more (50#)  Patient Self Reported Health Status: good  Rehabilitation Potential: good    Short Term Goals: To be accomplished in 5 treatments:  1. Pt will be compliant and independent with HEP in order to facilitate PT sessions and aid with self management   Eval Status:  Initiated   Current Status:  2. Pt to tolerate 30 min or more of TE and/or Interventions w/o increased s/s   Eval Status:  Initiated   Current Status:    Long Term Goals: To be accomplished in 10 treatments:  1.   Pt will report 50% improvement or better with low back dysfunction, to show a significant increase in ability to tolerate ADL   Eval Status:  Initiated   Current Status:  2. Pt will demonstrate a negative left slump test to show decreased neural tension left LE for improved function with daily activity    Eval Status:  (+) Left Slump Test   Current Status:  3. Pt will have decreased pain at 3/10 or better for carryover to increased tolerance with work and daily household tasks with little difficulty for return to PLOF with little difficulty     Eval Status:  Pain 7/10   Current Status:  4. Pt will report the ability to stand and cook, do housework and usual work tasks for more than 2 hrs with little to no difficulty for return to performing usual daily activity with little interruptions due to pain    Eval Status:  Stand for 2 hrs before needing a break   Current Status:  5. Pt will improve FOTO score to 60 in 10 visits to show significant improvement in function for progress to performing usual daily activity   Eval Status: FOTO = 43   Current Status:    Frequency / Duration: Patient to be seen 2 times per week for 10 treatments. Patient/ Caregiver education and instruction: Diagnosis, prognosis, self care, activity modification and exercises   [x]  Plan of care has been reviewed with PTA  Comments:  Patient provided w/ALANNA      Aristidesjanellebimal Walker, PT 6/13/2022 9:07 AM  _____________________________________________________________________  I certify that the above Therapy Services are being furnished while the patient is under my care. I agree with the treatment plan and certify that this therapy is necessary.     [de-identified] Signature:____________Date:_________TIME:________     Jamilah Jean-Baptiste NP  ** Signature, Date and Time must be completed for valid certification **    Please sign and return to [facility] at [address]  Phone: [phone] Fax:  [phone]

## 2022-06-13 NOTE — LETTER
NOTIFICATION RETURN TO WORK / SCHOOL    6/13/2022 8:35 AM    Ms. Wendi Cobb  [address] [facility]      To Whom It May Concern:    Wendi Cobb is currently under the care of [address]. She has been referred to physical therapy for her spinal condition. If there are questions or concerns please have the patient contact our office.         Sincerely,      Jacque Bueno MD

## 2022-06-13 NOTE — PROGRESS NOTES
PT DAILY TREATMENT NOTE/LUMBAR EVAL     Patient Name: Blane Springer  Date:2022  : 1970  [x]  Patient  Verified  Payor: Ghislaine Pittman / Plan: Franck Matt / Product Type: Managed Care Medicaid /    In time:1209  Out time:1240  Total Treatment Time (min): 31  Visit #: 1 of 10    Medicare/BCBS Only   Total Timed Codes (min):    1:1 Treatment Time:          Treatment Area: Other low back pain [M54.59]  Bilateral sciatica [M54.31, M54.32]      SUBJECTIVE  Pain Level (0-10 scale): 6  []constant [x]intermittent []improving []worsening []no change since onset     Any medication changes, allergies to medications, adverse drug reactions, diagnosis change, or new procedure performed?: [x] No    [] Yes (see summary sheet for update)  Subjective functional status/changes:       Subjective: Patient is a 45 y.o. female referred to PT with the above Dx. Patient seen today for c/o (B) LBP and sciatica for the past two weeks with reason unknown or Possibly due to adjusting/moving patients at work. Pain in low to mid back with standing, lifting, walking and working for more than two hrs. Patient presents to PT with an impaired gait, decreased balance, decreased strength, decreased flexibility, and decreased mobility of the low back and pelvis. She has a (+) left slump test and painful palpation of (B) superior glute and (B) Lx/Tx paraspinals. .  Patient s/s appear to be consistent w/ diagnosis. Patient demonstrates the potential to make functional gains within a reasonable time frame. Patient will benefit from skilled PT to address impairments and improve functional mobility, strength, gait and balance for an improved quality of life. Fall Risk Assessment: Patient demonstrates no Fall Risk      Mechanism of Injury: Unsure, possibly lifting patients at work    Comorbidities: Asthma, Hearing Impaired  Prior Level of Function: Able to walk stairs without use of hand rails.   Able to do all usual work without pain. 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 Yes, with need of stair rails    What type of daily activities/hobbies? Work, shop, Stand to [employer] and clean    Limitations to Activity/Recreation/PLOF: Not able to shop as much, Increased pain with driving, Hard to bend and twist with patient care, difficulty completing household chores, standing, cooking       Barriers: [x]pain []financial []time []transportation []other    Motivation: High    FABQ Score: []low []elevate    Cognition: A & O x 3    Other:    Risk For Falls:   [x]No  []low []elevate           OBJECTIVE/EXAMINATION  Demonstrated Balance: Good with standing and walking  Demonstrated Mobility: (I)  - Gait: Decr Trunk rot (B), Decr (B) Pelvic sway, Decr (B) Hip Flx  - (B) Trunk Rot at 50%  - TTP With muscle tightness at the right Tx paraspinal  - TTP (B) Tx/Lx paraspinal  - TTP (B) Trochanter  - (B) Pelvic obliquity  - (+) Left Slump Test         AROM PROM Strength Pain? ?    Right Left Right Left Right Left    Hip Flx     2+ 2+                    21 min [x]Eval                  []Re-Eval         10  NC min Therapeutic Exercise:  [x] See flow sheet : Develop and instruct HEP   Rationale: increase ROM, increase strength, and improve coordination to improve the patients ability to Initiate HEP and improve daily function                                                                  With   [x] TE   [] TA   [] neuro   [] other: Patient Education: [x] Review HEP    [] Progressed/Changed HEP based on:   [] positioning   [] body mechanics   [] transfers   [] heat/ice application    [] other:       Other Objective/Functional Measures:      Access Code: [email or web address]  URL: [email or web address]  Date: 06/13/2022  Prepared by: Harlen Angelucci    Exercises  Standing Marching - 2 x daily - 7 x weekly - 3 sets - 10 reps  Toe Touches - 2 x daily - 7 x weekly - 3 sets - 10 reps  Seated Trunk Rotation - 2 x daily - 7 x weekly - 3 sets - 10 reps  Seated Flexion Stretch - 2 x daily - 7 x weekly - 1 sets - 10 reps - 5 hold  Seated Flexion Stretch with Swiss Ball - 2 x daily - 7 x weekly - 1 sets - 10 reps - 5 hold  Seated Piriformis Stretch - 2 x daily - 7 x weekly - 1 sets - 3 reps - 30\" hold  Supine Piriformis Stretch with Leg Straight - 2 x daily - 7 x weekly - 1 sets - 3 reps - 30 hold  Sciatic Nerve Glide - 2 x daily - 7 x weekly - 2 sets - 20 reps - hold    Pain Level (0-10 scale) post treatment: 6     ASSESSMENT/Changes in Function:    - Pt reports understanding of HEP      [x]  See Plan of Care  []  See progress note/recertification  []  See Discharge Summary         Short Term Goals: To be accomplished in 5 treatments:  1. Pt will be compliant and independent with HEP in order to facilitate PT sessions and aid with self management   Eval Status:  Initiated   Current Status:  2. Pt to tolerate 30 min or more of TE and/or Interventions w/o increased s/s   Eval Status:  Initiated   Current Status:    Long Term Goals: To be accomplished in 10 treatments:  1. Pt will report 50% improvement or better with low back dysfunction, to show a significant increase in ability to tolerate ADL   Eval Status:  Initiated   Current Status:  2. Pt will demonstrate a negative left slump test to show decreased neural tension left LE for improved function with daily activity    Eval Status:  (+) Left Slump Test   Current Status:  3. Pt will have decreased pain at 3/10 or better for carryover to increased tolerance with work and daily household tasks with little difficulty for return to PLOF with little difficulty     Eval Status:  Pain 7/10   Current Status:  4. Pt will report the ability to stand and cook, do housework and usual work tasks for more than 2 hrs with little to no difficulty for return to performing usual daily activity with little interruptions due to pain    Eval Status:  Stand for 2 hrs before needing a break   Current Status:  5.   Pt will improve FOTO score to 60 in 10 visits to show significant improvement in function for progress to performing usual daily activity   Eval Status: FOTO = 43   Current Status:     PLAN  [x]  Upgrade activities as tolerated     [x]  Continue plan of care  []  Update interventions per flow sheet       []  Discharge due to:_  []  Other:_         Georgi Paredes, PT 6/13/2022  9:26 AM

## 2022-06-13 NOTE — TELEPHONE ENCOUNTER
Patient called for [place] or ANDREW Madrigal. Patient said that she needs a letter for work. That the letter must state that she was referred by us to Physical Therapy for her Back. Patient said her employer is requesting the letter. That she needs the letter as soon as possible today. Patient said she has an appointment today with [facility] at [facility] , and they will also give her a letter, but she still needs the letter from [address] office stating they referred her to Physical Therapy. Will  from the [facility] location. Patient tel. [phone].

## 2022-06-14 ENCOUNTER — TELEPHONE (OUTPATIENT)
Dept: ORTHOPEDIC SURGERY | Age: 52
End: 2022-06-14

## 2022-06-14 NOTE — LETTER
NOTIFICATION RETURN TO WORK / SCHOOL    6/14/2022 12:07 PM    Ms. Milena Morrow  [address] [place] [address]      To Whom It May Concern:    Milena Morrow is currently under the care of [address]. She will be on a no duty status 6-20-22 through 6-23-22. If there are questions or concerns please have the patient contact our office.         Sincerely,      Ronnie Crooks MD

## 2022-06-14 NOTE — TELEPHONE ENCOUNTER
Patient called stating that she would like a note to excuse her from work on 6/20/22, 6/21/22, 6/22/22, 6/23/22 when see gets her FMLA paperwork completed. She is starting physical therapy next week.  Please advise patient at [phone]

## 2022-06-15 ENCOUNTER — HOSPITAL ENCOUNTER (OUTPATIENT)
Dept: PHYSICAL THERAPY | Age: 52
Discharge: HOME | End: 2022-06-15
Attending: NURSE PRACTITIONER
Payer: MEDICAID

## 2022-06-15 PROCEDURE — 97110 THERAPEUTIC EXERCISES: CPT

## 2022-06-15 PROCEDURE — 97530 THERAPEUTIC ACTIVITIES: CPT

## 2022-06-15 PROCEDURE — 97112 NEUROMUSCULAR REEDUCATION: CPT

## 2022-06-15 NOTE — PROGRESS NOTES
PT DAILY TREATMENT NOTE     Patient Name: Martin Maldonado  Date:6/15/2022  : 1970  [x]  Patient  Verified  Payor: Natylucas Comment / Plan: [id number] [employer] / Product Type: Managed Care Medicaid /    In time:415  Out time:501  Total Treatment Time (min): 46  Visit #: 2 of 10    Treatment Area: Other low back pain [M54.59]  Bilateral sciatica [M54.31, M54.32]    SUBJECTIVE  Pain Level (0-10 scale): 0/10  Any medication changes, allergies to medications, adverse drug reactions, diagnosis change, or new procedure performed?: [x] No    [] Yes (see summary sheet for update)  Subjective functional status/changes:   [] No changes reported  Reports no pain today     OBJECTIVE    24 min Therapeutic Exercise:  [x] See flow sheet :   Rationale: increase ROM and increase strength to improve the patients ability to perform ADLs    12 min Therapeutic Activity:  [x]  See flow sheet :   Rationale: increase ROM, increase strength and improve coordination  to improve the patients ability to perform ADLs     10 min Neuromuscular Re-education:  [x]  See flow sheet :   Rationale: increase ROM, increase strength and improve coordination  to improve the patients ability to perform ADLs    With   [x] TE   [] TA   [] neuro   [] other: Patient Education: [x] Review HEP    [] Progressed/Changed HEP based on:   [] positioning   [] body mechanics   [] transfers   [] heat/ice application    [] other:      Other Objective/Functional Measures:   - Initiate TE per flow sheet      Pain Level (0-10 scale) post treatment: 0/10    ASSESSMENT/Changes in Function: Patient actively participates in therapy and tolerates initiation of TE well with no c/o increased pain or s/s .      Patient will continue to benefit from skilled PT services to modify and progress therapeutic interventions, address functional mobility deficits, address ROM deficits, address strength deficits, analyze and address soft tissue restrictions and analyze and cue movement patterns to attain remaining goals. []  See Plan of Care  []  See progress note/recertification  []  See Discharge Summary         Progress towards goals / Updated goals:  Short Term Goals: To be accomplished in 5 treatments:  1. Pt will be compliant and independent with HEP in order to facilitate PT sessions and aid with self management             Eval Status:  Initiated             Current Status: Met reports compliance with therapy  2. Pt to tolerate 30 min or more of TE and/or Interventions w/o increased s/s             Eval Status:  Initiated             Current Status: Met, patient tolerated 46  min of TE/Interventions with no c/o pain      Long Term Goals: To be accomplished in 10 treatments:  1. Pt will report 50% improvement or better with low back dysfunction, to show a significant increase in ability to tolerate ADL             Eval Status:  Initiated             Current Status:  2. Pt will demonstrate a negative left slump test to show decreased neural tension left LE for improved function with daily activity              Eval Status:  (+) Left Slump Test             Current Status:  3. Pt will have decreased pain at 3/10 or better for carryover to increased tolerance with work and daily household tasks with little difficulty for return to PLOF with little difficulty               Eval Status:  Pain 7/10             Current Status:  4. Pt will report the ability to stand and cook, do housework and usual work tasks for more than 2 hrs with little to no difficulty for return to performing usual daily activity with little interruptions due to pain              Eval Status:  Stand for 2 hrs before needing a break             Current Status:  5.   Pt will improve FOTO score to 60 in 10 visits to show significant improvement in function for progress to performing usual daily activity             Eval Status: FOTO = 43             Current Status:    PLAN  [x]  Upgrade activities as tolerated     [x]  Continue plan of care  []  Update interventions per flow sheet       []  Discharge due to:_  []  Other:_      Anaya Nuñez, PTA 6/15/2022  4:28 PM    Future Appointments   Date Time Provider Coral Reeves   6/22/2022  8:45 AM Dwayne Kenyon, PT [facility]   6/23/2022  8:00 AM Judylavern Camacho, PTA [facility]   6/23/2022  2:00 PM Javi Cano, NP [facility]   6/28/2022  8:45 AM Mynor Camacho, PTA [facility]   6/30/2022  8:45 AM Mynor Camacho, PTA [facility]   7/5/2022  8:45 AM Mynor Camacho, PTA [facility]   7/8/2022  8:45 AM Dwayne Kenyon, PT [facility]   8/2/2022 11:00 AM Adama Bello MD [facility]   8/3/2022  8:00 AM Miky Licea MD [facility]

## 2022-06-21 ENCOUNTER — APPOINTMENT (OUTPATIENT)
Dept: PHYSICAL THERAPY | Age: 52
End: 2022-06-21
Attending: NURSE PRACTITIONER
Payer: MEDICAID

## 2022-06-22 ENCOUNTER — HOSPITAL ENCOUNTER (OUTPATIENT)
Dept: PHYSICAL THERAPY | Age: 52
Discharge: HOME | End: 2022-06-22
Attending: NURSE PRACTITIONER
Payer: MEDICAID

## 2022-06-22 PROCEDURE — 97110 THERAPEUTIC EXERCISES: CPT

## 2022-06-22 PROCEDURE — 97112 NEUROMUSCULAR REEDUCATION: CPT

## 2022-06-22 PROCEDURE — 97530 THERAPEUTIC ACTIVITIES: CPT

## 2022-06-22 NOTE — PROGRESS NOTES
PT DAILY TREATMENT NOTE     Patient Name: Georgina Ulrich  Date:2022  : 1970  [x]  Patient  Verified  Payor: Nicci Rivera / Plan: Mariam Em / Product Type: Managed Care Medicaid /    In [id number]  Out [id number]  Total Treatment Time (min): 40  Visit #: 3 of 10    Medicare/BCBS Only   Total Timed Codes (min):    1:1 Treatment Time:          Treatment Area: Other low back pain [M54.59]  Bilateral sciatica [M54.31, M54.32]    SUBJECTIVE  Pain Level (0-10 scale): 7  Any medication changes, allergies to medications, adverse drug reactions, diagnosis change, or new procedure performed?: [x] No    [] Yes (see summary sheet for update)  Subjective functional status/changes:   [] No changes reported  Increased pain and tightness at the left hip area. OBJECTIVE       18 min Therapeutic Exercise:  [x] See flow sheet :   Rationale: increase ROM, increase strength and improve coordination to improve the patients ability to tolerate increased activity    8 min Therapeutic Activity:  [x]  See flow sheet :   Rationale: increase ROM, increase strength and improve coordination  to improve the patients ability to improve daily function     10 min Neuromuscular Re-education:  []  See flow sheet :   Rationale: increase strength and improve coordination  to improve the patients ability to improve function    4 min Manual Therapy:  Inf glide right sacral base, (B) Innominate p/a mobs, (B) LE LAD, (B) L/S and T/S Rot mobs     The manual therapy interventions were performed at a separate and distinct time from the therapeutic activities interventions.   Rationale: decrease pain, increase ROM and increase tissue extensibility to perform increased activity          With   [x] TE   [x] TA   [x] neuro   [] other: Patient Education: [x] Review HEP    [] Progressed/Changed HEP based on:   [] positioning   [] body mechanics   [] transfers   [] heat/ice application    [] other:      Other Objective/Functional Measures:   - Some difficulty with completing bike today with c/o right knee pain  - Decr mobility (B) Innominate in stork stance  - Elev right sacral base  -      Pain Level (0-10 scale) post treatment: 2    ASSESSMENT/Changes in Function:   - Good response to treatment with improved pelvic mobility and decreased pain after treatment    Patient will continue to benefit from skilled PT services to modify and progress therapeutic interventions, address functional mobility deficits, address ROM deficits, address strength deficits, analyze and address soft tissue restrictions and analyze and cue movement patterns to attain remaining goals.      []  See Plan of Care  []  See progress note/recertification  []  See Discharge Summary         Progress towards goals / Updated goals:  Short Term Goals: To be accomplished in 5 treatments:  1.  Pt will be compliant and independent with HEP in order to facilitate PT sessions and aid with self management             Eval Status:  Initiated             Current Status: Met reports compliance with therapy  2.  Pt to tolerate 30 min or more of TE and/or Interventions w/o increased s/s             Eval Status:  Initiated             Current Status: Met, patient tolerated 46  min of TE/Interventions with no c/o pain      Long Term Goals: To be accomplished in 10 treatments:  1.  Pt will report 50% improvement or better with low back dysfunction, to show a significant increase in ability to tolerate ADL             Eval Status:  Initiated             Current Status:  2.  Pt will demonstrate a negative left slump test to show decreased neural tension left LE for improved function with daily activity              Eval Status:  (+) Left Slump Test             Current Status: Continued neural tension in the Left LE 6/22/22  3.  Pt will have decreased pain at 3/10 or better for carryover to increased tolerance with work and daily household tasks with little difficulty for return to PLOF with little difficulty               Eval Status:  Pain 7/10             Current Status:  Pain 7/10 decreasing to 2/10 after treatment 6/22/22  4.  Pt will report the ability to stand and cook, do housework and usual work tasks for more than 2 hrs with little to no difficulty for return to performing usual daily activity with little interruptions due to pain              Eval Status:  Stand for 2 hrs before needing a break             Current Status:  5.  Pt will improve FOTO score to 60 in 10 visits to show significant improvement in function for progress to performing usual daily activity             Eval Status: FOTO = 43             Current Status:    PLAN  []  Upgrade activities as tolerated     []  Continue plan of care  []  Update interventions per flow sheet       []  Discharge due to:_  []  Other:_      Mrei Sen, PT 6/22/2022  8:55 AM    Future Appointments   Date Time Provider Coral Reeves   6/23/2022  8:00 AM Ivon Gracia [address]   6/23/2022  2:00 PM ANDREW Howard BS AMB   6/28/2022  8:45 AM Ivon Garcia, [address]   6/30/2022  8:45 AM Ivon Garcia [address]   7/5/2022  8:45 AM Ivon Garcia [address]   7/8/2022  8:45 AM Liz Zhou, PT [address]   8/2/2022 11:00 AM Hope Tolliver MD STEFAN BS AMB   8/3/2022  8:00 AM Brenda Silva MD [facility] BS AMB

## 2022-06-23 ENCOUNTER — VIRTUAL VISIT (OUTPATIENT)
Dept: ORTHOPEDIC SURGERY | Age: 52
End: 2022-06-23
Payer: MEDICAID

## 2022-06-23 ENCOUNTER — HOSPITAL ENCOUNTER (OUTPATIENT)
Dept: PHYSICAL THERAPY | Age: 52
End: 2022-06-23
Attending: NURSE PRACTITIONER
Payer: MEDICAID

## 2022-06-23 DIAGNOSIS — M43.16 SPONDYLOLISTHESIS AT L4-L5 LEVEL: ICD-10-CM

## 2022-06-23 DIAGNOSIS — M79.18 MYOFASCIAL PAIN: ICD-10-CM

## 2022-06-23 DIAGNOSIS — M54.41 CHRONIC BILATERAL LOW BACK PAIN WITH BILATERAL SCIATICA: Primary | ICD-10-CM

## 2022-06-23 DIAGNOSIS — G89.29 CHRONIC BILATERAL LOW BACK PAIN WITH BILATERAL SCIATICA: Primary | ICD-10-CM

## 2022-06-23 DIAGNOSIS — M54.42 CHRONIC BILATERAL LOW BACK PAIN WITH BILATERAL SCIATICA: Primary | ICD-10-CM

## 2022-06-23 PROCEDURE — 99443 PR PHYS/QHP TELEPHONE EVALUATION 21-30 MIN: CPT | Performed by: NURSE PRACTITIONER

## 2022-06-23 NOTE — PROGRESS NOTES
History of Present Illness:    Consent: Grant Cardoza, who was seen by telephone call, and/or her healthcare decision maker, is aware that this patient-initiated, Telehealth encounter on 6/23/2022 is a billable service, with coverage as determined by her insurance carrier. She is aware that she may receive a bill and has provided verbal consent to proceed: Yes. The provider was located at [facility] and the patient was located at their home. No one else participated in the visit with the patient. The platform used was telephone call    Patient is a 54-year-old female who has back pain and bilateral buttock pain. Last visit we put her in physical therapy and gave her Robaxin along with Motrin. She started physical therapy on June 13. 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 Purpose of this visit is to fill out her FMLA paperwork. Physical Exam: The patient is a 54-year-old female who is alert and oriented. She spoke with fluency. She did not appear to be in distress. Assessment & Plan: This is a patient has a lumbar spondylolisthesis that gives her back and buttock pain. I have filled out her FMLA paperwork. We will give her a note stating she was out of work from June 20 to 32. She will finish out her physical therapy. We will see her back at her follow-up visit on August 2, 2022. Diagnoses and all orders for this visit:    1. Chronic bilateral low back pain with bilateral sciatica    2. Myofascial pain    3. Spondylolisthesis at L4-L5 level             We discussed the expected course, resolution and complications of the diagnosis(es) in detail. Medication risks, benefits, costs, interactions, and alternatives were discussed as indicated. I advised her to contact the office if her condition worsens, changes or fails to improve as anticipated.  For emergencies or worsening neurological symptoms the patient was instructed to call 911. She expressed understanding with the diagnosis(es) and plan. Follow-up and Dispositions    · Return for As scheduled at her follow-up appointment in August with Dr. Sukhdev Pradhan. Trevor HOOD [address] is a 45 y.o. female being evaluated by a video visit encounter for concerns as above. A caregiver was present when appropriate. Due to this being a TeleHealth encounter (During [id number] public [facility] emergency), evaluation of the following organ systems was limited: Vitals/Constitutional/EENT/Resp/CV/GI//MS/Neuro/Skin/Heme-Lymph-Imm. Pursuant to the emergency declaration under the [address] [place] [address] [facility] authority and the [employer] Act, this Virtual  Visit was conducted, with patient's (and/or legal guardian's) consent, to reduce the patient's risk of exposure to COVID-19 and provide necessary medical care. CPT Codes 95681-60110 for Established Patients may apply to this Telehealth Visit  Time-based coding, delete if not needed: I spent at least 15 minutes with this established patient, and >50% of the time was spent counseling and/or coordinating care regarding Back and buttock pain and form completion  Start time: 1:50 PM and Stop time: 2:16 PM.        Due to this being a TeleHealth evaluation, many elements of the physical examination are unable to be assessed. Pursuant to the emergency declaration under the [address] [place] [address] authority and the [employer] Act, this Virtual  Visit was conducted, with patient's consent, to reduce the patient's risk of exposure to COVID-19 and provide continuity of care for an established patient.      Services were provided through a video synchronous discussion virtually to substitute for in-person clinic visit.     Lizette Sousa NP

## 2022-06-24 ENCOUNTER — TELEPHONE (OUTPATIENT)
Dept: ORTHOPEDIC SURGERY | Age: 52
End: 2022-06-24

## 2022-06-24 NOTE — LETTER
NOTIFICATION RETURN TO WORK / SCHOOL    6/24/2022 9:57 AM    Ms. Florence Diaz  [address] [facility]      To Whom It May Concern:    Florence Diaz is currently under the care of [address] [place] [address] Demond Ferrera. She will return to work/school on: June 27, 2022. She was out of work from June 20-26, 2022    If there are questions or concerns please have the patient contact our office.         Sincerely,        Heladio Ferrera MD

## 2022-06-24 NOTE — TELEPHONE ENCOUNTER
Pt needs fmla by Monday please advise when done and signed - pt states she neglected to tell [employer] when she had her appt yesterday to fill out.  Pt advise letter ready for  or print from [facility]

## 2022-06-24 NOTE — TELEPHONE ENCOUNTER
Patient called requesting to  her work note.  Patient stated that the doctor or someone from the clinical staff can give her a call letting her know that the work note is ready @ [phone]

## 2022-06-28 ENCOUNTER — HOSPITAL ENCOUNTER (OUTPATIENT)
Dept: PHYSICAL THERAPY | Age: 52
Discharge: HOME | End: 2022-06-28
Attending: NURSE PRACTITIONER
Payer: MEDICAID

## 2022-06-28 PROCEDURE — 97110 THERAPEUTIC EXERCISES: CPT

## 2022-06-28 PROCEDURE — 97112 NEUROMUSCULAR REEDUCATION: CPT

## 2022-06-28 PROCEDURE — 97530 THERAPEUTIC ACTIVITIES: CPT

## 2022-06-28 NOTE — PROGRESS NOTES
PT DAILY TREATMENT NOTE     Patient Name: Alice Doe  Date:2022  : 1970  [x]  Patient  Verified  Payor: Angela Iverson / Plan: Chidi Carnes / Product Type: Managed Care Medicaid /    In time:847  Out time:933  Total Treatment Time (min): 46  Visit #: 4 of 10      Treatment Area: Other low back pain [M54.59]  Bilateral sciatica [M54.31, M54.32]    SUBJECTIVE  Pain Level (0-10 scale): 10/10  Any medication changes, allergies to medications, adverse drug reactions, diagnosis change, or new procedure performed?: [x] No    [] Yes (see summary sheet for update)  Subjective functional status/changes:   [] No changes reported  Reports increased pain in the left tx region. Reports returning to work yesterday and onset of increased pain occurred at 6pm last night.      OBJECTIVE    Modality rationale: decrease pain to improve the patients ability to perform ADLs   Min Type Additional Details   8 [x] Estim:  []Unatt       [x]IFC  []Premod                        []Other:  [x]w/ice   []w/heat  Position: Prone  Location: Tx    [] Estim: []Att    []TENS instruct  []NMES                    []Other:  []w/US   []w/ice   []w/heat  Position:  Location:    []  Traction: [] Cervical       []Lumbar                       [] Prone          []Supine                       []Intermittent   []Continuous Lbs:  [] before manual  [] after manual    []  Ultrasound: []Continuous   [] Pulsed                           []1MHz   []3MHz W/cm2:  Location:    []  Iontophoresis with dexamethasone         Location: [] Take home patch   [] In clinic    []  Ice     []  heat  []  Ice massage  []  Laser   []  Anodyne Position:  Location:    []  Laser with stim  []  Other:  Position:  Location:    []  Vasopneumatic Device    []  Right     []  Left  Pre-treatment girth:  Post-treatment girth:  Measured at (location):  Pressure:       [] lo [] med [] hi   Temperature: [] lo [] med [] hi   [x] Skin assessment post-treatment: [x]intact []redness- no adverse reaction    []redness - adverse reaction:       15 min Therapeutic Exercise:  [x] See flow sheet :   Rationale: increase ROM and increase strength to improve the patients ability to perform ADLs    10 min Therapeutic Activity:  [x]  See flow sheet :   Rationale: increase ROM, increase strength and improve coordination  to improve the patients ability to perform ADLs     10 min Neuromuscular Re-education:  [x]  See flow sheet :   Rationale: increase ROM, increase strength and improve coordination  to improve the patients ability to perform ADLs    3 min Manual Therapy:  STM/DTM right Tx paraspinals, p/a mobs tx    The manual therapy interventions were performed at a separate and distinct time from the therapeutic activities interventions. Rationale: decrease pain, increase ROM, increase tissue extensibility and decrease trigger points to perform ADLs            With   [x] TE   [] TA   [] neuro   [] other: Patient Education: [x] Review HEP    [] Progressed/Changed HEP based on:   [] positioning   [] body mechanics   [] transfers   [] heat/ice application    [] other:      Other Objective/Functional Measures:   - Decreased activity tolerance d/t pain     Pain Level (0-10 scale) post treatment: 2/10    ASSESSMENT/Changes in Function: Patient actively participates in therapy and tolerates progressions well with no c/o increased pain. Recommended patient continue with ice at home multiple times throughout the day     Patient will continue to benefit from skilled PT services to modify and progress therapeutic interventions, address functional mobility deficits, address ROM deficits, address strength deficits, analyze and address soft tissue restrictions and analyze and cue movement patterns to attain remaining goals.      []  See Plan of Care  []  See progress note/recertification  []  See Discharge Summary         Progress towards goals / Updated goals:  Short Term Goals: To be accomplished in 5 treatments:  1.  Pt will be compliant and independent with HEP in order to facilitate PT sessions and aid with self management             Eval Status:  Initiated             Current Status: Met reports compliance with therapy  2.  Pt to tolerate 30 min or more of TE and/or Interventions w/o increased s/s             Eval Status:  Initiated             Current Status: Met, patient tolerated 46  min of TE/Interventions with no c/o pain      Long Term Goals: To be accomplished in 10 treatments:  1.  Pt will report 50% improvement or better with low back dysfunction, to show a significant increase in ability to tolerate ADL             Eval Status:  Initiated             Current Status:  2.  Pt will demonstrate a negative left slump test to show decreased neural tension left LE for improved function with daily activity              Eval Status:  (+) Left Slump Test             Current Status: Continued neural tension in the Left LE 6/22/22  3.  Pt will have decreased pain at 3/10 or better for carryover to increased tolerance with work and daily household tasks with little difficulty for return to PLOF with little difficulty               Eval Status:  Pain 7/10             Current Status:  Pain 10/10 decreasing to 2/10 after treatment 6/28/22  4.  Pt will report the ability to stand and cook, do housework and usual work tasks for more than 2 hrs with little to no difficulty for return to performing usual daily activity with little interruptions due to pain              Eval Status:  Stand for 2 hrs before needing a break             Current Status:  5.  Pt will improve FOTO score to 60 in 10 visits to show significant improvement in function for progress to performing usual daily activity             Eval Status: FOTO = 43             Current Status:    PLAN  [x]  Upgrade activities as tolerated     [x]  Continue plan of care  []  Update interventions per flow sheet       []  Discharge due to:_  [] Other:_      Shawn Calvert, PTA 6/28/2022  8:50 AM    Future Appointments   Date Time Provider Coral Lanei   6/30/2022  8:45 AM Mikala Hess, PTA [facility]   7/5/2022  8:45 AM Mikala Hess, ALINA [facility]   7/8/2022  8:45 AM Corin Hunter, PT [facility]   8/2/2022 11:00 AM Divina Dodson MD [place] BS AMB   8/3/2022  8:00 AM Aleks Cunningham MD [facility] BS AMB

## 2022-06-29 ENCOUNTER — PATIENT MESSAGE (OUTPATIENT)
Dept: FAMILY MEDICINE CLINIC | Age: 52
End: 2022-06-29

## 2022-06-29 ENCOUNTER — TELEPHONE (OUTPATIENT)
Dept: ORTHOPEDIC SURGERY | Age: 52
End: 2022-06-29

## 2022-06-29 DIAGNOSIS — M54.16 LUMBAR NEURITIS: ICD-10-CM

## 2022-06-29 DIAGNOSIS — M54.50 LUMBAR PAIN: ICD-10-CM

## 2022-06-29 RX ORDER — METHYLPREDNISOLONE 4 MG/1
TABLET ORAL
Qty: 1 DOSE PACK | Refills: 0 | Status: SHIPPED | OUTPATIENT
Start: 2022-06-29 | End: 2022-08-02 | Stop reason: ALTCHOICE

## 2022-06-29 NOTE — TELEPHONE ENCOUNTER
Patient says she's having a flare up and needs a refill of steroid pack called in to [facility] on [address] as soon as possible. Patient can be reached at [phone] if needed.

## 2022-06-30 ENCOUNTER — HOSPITAL ENCOUNTER (OUTPATIENT)
Dept: PHYSICAL THERAPY | Age: 52
Discharge: HOME | End: 2022-06-30
Attending: NURSE PRACTITIONER
Payer: MEDICAID

## 2022-06-30 PROCEDURE — 97112 NEUROMUSCULAR REEDUCATION: CPT

## 2022-06-30 PROCEDURE — 97530 THERAPEUTIC ACTIVITIES: CPT

## 2022-06-30 PROCEDURE — 97110 THERAPEUTIC EXERCISES: CPT

## 2022-06-30 NOTE — PROGRESS NOTES
PT DAILY TREATMENT NOTE     Patient Name: Sidney Rosa  Date:2022  : 1970  [x]  Patient  Verified  Payor: Maris Rob / Plan: Cortney Rank / Product Type: Managed Care Medicaid /    In time:900  Out time:941  Total Treatment Time (min): 41  Visit #: 5 of 10    Medicare/BCBS Only   Total Timed Codes (min):  41 1:1 Treatment Time:  41       Treatment Area: Other low back pain [M54.59]  Bilateral sciatica [M54.31, M54.32]    SUBJECTIVE  Pain Level (0-10 scale): 3/10  Any medication changes, allergies to medications, adverse drug reactions, diagnosis change, or new procedure performed?: [x] No    [] Yes (see summary sheet for update)  Subjective functional status/changes:   [] No changes reported  Reports decreased pain today with continued tightness in the left thoracic region. Patient arrived 15 minutes late. OBJECTIVE      18 min Therapeutic Exercise:  [x] See flow sheet :   Rationale: increase ROM and increase strength to improve the patients ability to perform ADLs    12 min Therapeutic Activity:  [x]  See flow sheet :   Rationale: increase ROM, increase strength and improve coordination  to improve the patients ability to perform ADLs     8 min Neuromuscular Re-education:  [x]  See flow sheet :   Rationale: increase ROM, increase strength and improve coordination  to improve the patients ability to perform ADLs    3 min Manual Therapy:  LAD Right LE, P/a mob thoracic lumbar    The manual therapy interventions were performed at a separate and distinct time from the therapeutic activities interventions.   Rationale: decrease pain, increase ROM and increase tissue extensibility to perform ADLs        With   [x] TE   [] TA   [] neuro   [] other: Patient Education: [x] Review HEP    [] Progressed/Changed HEP based on:   [] positioning   [] body mechanics   [] transfers   [] heat/ice application    [] other:      Other Objective/Functional Measures:   - Cues for form with 3 way glute, upright posture and control for adequate ms activation      Pain Level (0-10 scale) post treatment: 0/10    ASSESSMENT/Changes in Function:   - Shortened treatment time d/t late arrival, patient has to leave to get back to work. - increased activity tolerance   - No c/o increased pain throughout tx     Patient will continue to benefit from skilled PT services to modify and progress therapeutic interventions, address functional mobility deficits, address ROM deficits, address strength deficits, analyze and address soft tissue restrictions and analyze and cue movement patterns to attain remaining goals.      []  See Plan of Care  []  See progress note/recertification  []  See Discharge Summary         Progress towards goals / Updated goals:  Short Term Goals: To be accomplished in 5 treatments:  1.  Pt will be compliant and independent with HEP in order to facilitate PT sessions and aid with self management             Eval Status:  Initiated             Current Status: Met reports compliance with therapy  2.  Pt to tolerate 30 min or more of TE and/or Interventions w/o increased s/s             Eval Status:  Initiated             Current Status: Met, patient tolerated 46  min of TE/Interventions with no c/o pain      Long Term Goals: To be accomplished in 10 treatments:  1.  Pt will report 50% improvement or better with low back dysfunction, to show a significant increase in ability to tolerate ADL             Eval Status:  Initiated             Current Status:  2.  Pt will demonstrate a negative left slump test to show decreased neural tension left LE for improved function with daily activity              Eval Status:  (+) Left Slump Test             Current Status: Continued neural tension in the Left LE 6/22/22  3.  Pt will have decreased pain at 3/10 or better for carryover to increased tolerance with work and daily household tasks with little difficulty for return to PLOF with little difficulty               Eval Status:  Pain 7/10             Current Status:  Progressing, pain at the start of treatment today 3/10 6/30/22  4.  Pt will report the ability to stand and cook, do housework and usual work tasks for more than 2 hrs with little to no difficulty for return to performing usual daily activity with little interruptions due to pain              Eval Status:  Stand for 2 hrs before needing a break             Current Status: Met, patient reports no difficulty with standing and no rest breaks needed 6/30/22   5.  Pt will improve FOTO score to 60 in 10 visits to show significant improvement in function for progress to performing usual daily activity             Eval Status: FOTO = 43             Current Status:       PLAN  [x]  Upgrade activities as tolerated     [x]  Continue plan of care  []  Update interventions per flow sheet       []  Discharge due to:_  []  Other:_      Fatoumata Justin PTA 6/30/2022  9:02 AM    Future Appointments   Date Time Provider Coral Reeves   7/5/2022  8:45 AM Grady Blunt, PTA [facility]   7/8/2022  8:45 AM Cipriano Martin, PT [facility]   8/2/2022 11:00 AM MD STEF[facility] [place] BS AMB   8/3/2022  8:00 AM Joe Simeon MD [facility] BS AMB

## 2022-07-05 ENCOUNTER — APPOINTMENT (OUTPATIENT)
Dept: PHYSICAL THERAPY | Age: 52
End: 2022-07-05
Attending: NURSE PRACTITIONER
Payer: MEDICAID

## 2022-07-07 ENCOUNTER — TELEPHONE (OUTPATIENT)
Dept: ORTHOPEDIC SURGERY | Age: 52
End: 2022-07-07

## 2022-07-07 NOTE — TELEPHONE ENCOUNTER
Patient called for Berta Mehta. Patient said her Physical Therapy finishes today. Patient is asking if Berta Mehta could extend her physical therapy for 4 more weeks for her Low Back pain. Patient goes to [facility] at the [facility] location   Tel. [phone]  Fax [phone]    Patient tel. [phone]. Note : last order was placed on  6/2/2022.

## 2022-07-07 NOTE — TELEPHONE ENCOUNTER
I contacted [facility] and told them that the patient would like to continue therapy and if they deem it necessary after her last session to please send a their therapy note with continuation on it. The patient was also told this.

## 2022-07-08 ENCOUNTER — HOSPITAL ENCOUNTER (OUTPATIENT)
Dept: PHYSICAL THERAPY | Age: 52
Discharge: HOME | End: 2022-07-08
Attending: NURSE PRACTITIONER
Payer: MEDICAID

## 2022-07-08 PROCEDURE — 97110 THERAPEUTIC EXERCISES: CPT

## 2022-07-08 PROCEDURE — 97112 NEUROMUSCULAR REEDUCATION: CPT

## 2022-07-08 PROCEDURE — 97530 THERAPEUTIC ACTIVITIES: CPT

## 2022-07-08 NOTE — PROGRESS NOTES
[facility] at [address] [facility] [facility], [address] [place] [address]  Phone: [phone]      Fax:  [phone]    Progress Note  Patient name: Sidney Rosa Start of Care: 2022   Referral source: Magda Govea NP : 1970               Medical Diagnosis: Other low back pain [M54.59]  Bilateral sciatica [M54.31, M54.32]  Payor: Maris Rob / Plan: Cortney Rank / Product Type: Managed Care Medicaid /  Onset Date: On or about 22               Treatment Diagnosis: Low Back Pain   Prior Hospitalization: see medical history Provider#: [id number]   Medications: Verified on Patient summary List   Comorbidities: Asthma, Hearing Impaired  Prior Level of Function: Able to walk stairs without use of hand rails.  Able to do all usual work without pain.  Able to stand more than 8 hrs and work or do house work with no trouble       Visits from [place] of Care: 6    Missed Visits: 3    Established Goals:          Excellent Good         Limited           None  [] Increased ROM   []  [x]  []  []  [] Increased Strength  []  [x]  []  []  [] Increased Mobility  []  [x]  []  []   [] Decreased Pain   []  [x]  []  []  [] Decreased Swelling  []  []  []  []    Key Functional Changes: Patient has shown good progress with this treatment program. Pain as of last visit was 0/10. Patient has shown decreased pain and increased strength, mobility and work tolerance. Patient reports 100% improvement with overall involvement but states at some times it is up and down. FOTO score is 61. All STG/LTGs achieved as identified below.     Progress towards goals / Updated goals:  Short Term Goals: To be accomplished in 5 treatments:  1.  Pt will be compliant and independent with HEP in order to facilitate PT sessions and aid with self management             Eval Status:  Initiated             Current Status: Met reports compliance with therapy  2.  Pt to tolerate 30 min or more of TE and/or Interventions w/o increased s/s             Eval Status:  Initiated             Current Status: Met, patient tolerated 46  min of TE/Interventions with no c/o pain      Long Term Goals: To be accomplished in 10 treatments:  1.  Pt will report 50% improvement or better with low back dysfunction, to show a significant increase in ability to tolerate ADL             Eval Status:  Initiated             Current Status: Met at 100%   2.  Pt will demonstrate a negative left slump test to show decreased neural tension left LE for improved function with daily activity              Eval Status:  (+) Left Slump Test             Current Status: Met with a negative Left Slump Test   3.  Pt will have decreased pain at 3/10 or better for carryover to increased tolerance with work and daily household tasks with little difficulty for return to PLOF with little difficulty               Eval Status:  Pain 7/10             Current Status: Met at 0/10 today    4.  Pt will report the ability to stand and cook, do housework and usual work tasks for more than 2 hrs with little to no difficulty for return to performing usual daily activity with little interruptions due to pain              Eval Status:  Stand for 2 hrs before needing a break             Current Status: Met, patient reports no difficulty with standing and no rest breaks needed 6/30/22   5.  Pt will improve FOTO score to 60 in 10 visits to show significant improvement in function for progress to performing usual daily activity             Eval Status: FOTO = 43             Current Status: Met at 61     Updated Goals: to be achieved in 10 treatments:  1. Pt will ambulate 500' (I) with little discomfort for carryover to improved walking tolerance with work                          Status at last note/certification: Little difficulty with walking distance              Current Status:             2.   Pt will ambulate 1/2 mine on TM with a good pace and little to no difficulty for progress to walking without difficulty in the community                           Status at last note/certification:              Current Status:             3. Pt will demonstrate stoop and lift of 20# and lift and carry of 20# for 100' with no difficulty to perform usual lifting at work with no difficulty                            Status at last note/certification:              Current Status:  ASSESSMENT/RECOMMENDATIONS:  [x]Continue therapy per initial plan/protocol at a frequency of  2 x per week for 10 treatments  []Continue therapy with the following recommended changes:_____________________      _____________________________________________________________________  []Discontinue therapy progressing towards or have reached established goals  []Discontinue therapy due to lack of appreciable progress towards goals  []Discontinue therapy due to lack of attendance or compliance  []Await Physician's recommendations/decisions regarding therapy  []Other:________________________________________________________________    Thank you for this referral.   Petty Schaefer, PT 7/8/2022 9:55 AM  NOTE TO PHYSICIAN:  PLEASE COMPLETE THE ORDERS BELOW AND   FAX TO [facility]: [phone]  If you are unable to process this request in 24 hours please contact our office:   [phone]  []  I have read the above report and request that my patient continue as recommended. []  I have read the above report and request that my patient continue therapy with the following changes/special instructions:________________________________________  []I have read the above report and request that my patient be discharged from therapy.     [de-identified] Signature:____________Date:_________TIME:________     Artie Santos NP  ** Signature, Date and Time must be completed for valid certification **

## 2022-07-08 NOTE — PROGRESS NOTES
PT DAILY TREATMENT NOTE     Patient Name: Bryn Real  Date:2022  : 1970  [x]  Patient  Verified  Payor: Eusebio De Jesus / Plan: Rashmi Arreguin / Product Type: Managed Care Medicaid /    In [id number]:[id number]  Out EKRK:[id number]  Total Treatment Time (min): 40  Visit #: 6 of 10    Medicare/BCBS Only   Total Timed Codes (min):    1:1 Treatment Time:          Treatment Area: Other low back pain [M54.59]  Bilateral sciatica [M54.31, M54.32]    SUBJECTIVE  Pain Level (0-10 scale): 0  Any medication changes, allergies to medications, adverse drug reactions, diagnosis change, or new procedure performed?: [x] No    [] Yes (see summary sheet for update)  Subjective functional status/changes:   [] No changes reported  Pt reports 100% improvement today but states that it is an up and down situation.   Able to do usual activity just get stiff    OBJECTIVE      20 min Therapeutic Exercise:  [x] See flow sheet :   Rationale: increase ROM, increase strength and improve coordination to improve the patients ability to improve daily function    8 min Therapeutic Activity:  []  See flow sheet :   Rationale: increase ROM, increase strength and improve coordination  to improve the patients ability to perform increased activity     12 min Neuromuscular Re-education:  [x]  See flow sheet :   Rationale: increase strength and improve coordination  to improve the patients ability to improved core stability for improved function          With   [x] TE   [] TA   [] neuro   [] other: Patient Education: [x] Review HEP    [] Progressed/Changed HEP based on:   [] positioning   [] body mechanics   [] transfers   [] heat/ice application    [] other:      Other Objective/Functional Measures:  - Trunk Rot at 75-80%  - VCs required for some exercises        Pain Level (0-10 scale) post treatment: 0    ASSESSMENT/Changes in Function:   - Pt showing increased ROM and tolerance to treatment program  - No added discomfort after treatment      Patient will continue to benefit from skilled PT services to modify and progress therapeutic interventions, address functional mobility deficits, address ROM deficits, address strength deficits, analyze and address soft tissue restrictions and analyze and cue movement patterns to attain remaining goals.      []  See Plan of Care  []  See progress note/recertification  []  See Discharge Summary         Progress towards goals / Updated goals:  Short Term Goals: To be accomplished in 5 treatments:  1.  Pt will be compliant and independent with HEP in order to facilitate PT sessions and aid with self management             Eval Status:  Initiated             Current Status: Met reports compliance with therapy  2.  Pt to tolerate 30 min or more of TE and/or Interventions w/o increased s/s             Eval Status:  Initiated             Current Status: Met, patient tolerated 46  min of TE/Interventions with no c/o pain      Long Term Goals: To be accomplished in 10 treatments:  1.  Pt will report 50% improvement or better with low back dysfunction, to show a significant increase in ability to tolerate ADL             Eval Status:  Initiated             Current Status: Met at 100% 7/8/22  2.  Pt will demonstrate a negative left slump test to show decreased neural tension left LE for improved function with daily activity              Eval Status:  (+) Left Slump Test             Current Status: Met with a negative Left Slump Test 7/8/22  3.  Pt will have decreased pain at 3/10 or better for carryover to increased tolerance with work and daily household tasks with little difficulty for return to PLOF with little difficulty               Eval Status:  Pain 7/10             Current Status: Met at 0/10 today 7/8/22   4.  Pt will report the ability to stand and cook, do housework and usual work tasks for more than 2 hrs with little to no difficulty for return to performing usual daily activity with little interruptions due to pain              Eval Status:  Stand for 2 hrs before needing a break             Current Status: Met, patient reports no difficulty with standing and no rest breaks needed 6/30/22   5.  Pt will improve FOTO score to 60 in 10 visits to show significant improvement in function for progress to performing usual daily activity             Eval Status: FOTO = 43             Current Status: Met at 61 7/8/22    New Goals  1.   Pt will ambulate 500' (I) with little discomfort for carryover to improved walking tolerance with work               Status at last note/certification: Little difficulty with walking distance   Current Status:             2.  Pt will ambulate 1/2 mine on TM with a good pace and little to no difficulty for progress to walking without difficulty in the community                Status at last note/certification:   Current Status:             3.  Pt will demonstrate stoop and lift of 20# and lift and carry of 20# for 100' with no difficulty to perform usual lifting at work with no difficulty                 Status at last note/certification:   Current Status:                        PLAN  [x]  Upgrade activities as tolerated     [x]  Continue plan of care  []  Update interventions per flow sheet       []  Discharge due to:_  []  Other:_      Rita Ross, PT 7/8/2022  8:58 AM    Future Appointments   Date Time Provider Coral Lala   7/30/2022  8:30 AM [facility] CHARLES RM 4 3D [facility]   8/2/2022 11:00 AM Chauncey Lin MD VOSS BS AMB   8/3/2022  8:00 AM MD YOUNG Vieyra BS AMB

## 2022-07-11 ENCOUNTER — APPOINTMENT (OUTPATIENT)
Dept: PHYSICAL THERAPY | Age: 52
End: 2022-07-11
Attending: NURSE PRACTITIONER
Payer: MEDICAID

## 2022-07-19 ENCOUNTER — APPOINTMENT (OUTPATIENT)
Dept: PHYSICAL THERAPY | Age: 52
End: 2022-07-19
Attending: NURSE PRACTITIONER
Payer: MEDICAID

## 2022-07-21 ENCOUNTER — APPOINTMENT (OUTPATIENT)
Dept: PHYSICAL THERAPY | Age: 52
End: 2022-07-21
Attending: NURSE PRACTITIONER
Payer: MEDICAID
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
## 2022-08-02 ENCOUNTER — OFFICE VISIT (OUTPATIENT)
Dept: ORTHOPEDIC SURGERY | Age: 52
End: 2022-08-02
Payer: MEDICAID

## 2022-08-02 VITALS
WEIGHT: 272 LBS | RESPIRATION RATE: 19 BRPM | OXYGEN SATURATION: 99 % | HEART RATE: 61 BPM | BODY MASS INDEX: 49.75 KG/M2 | TEMPERATURE: 97.2 F

## 2022-08-02 DIAGNOSIS — M54.50 LUMBAR PAIN: Primary | ICD-10-CM

## 2022-08-02 DIAGNOSIS — M43.16 SPONDYLOLISTHESIS AT L4-L5 LEVEL: ICD-10-CM

## 2022-08-02 DIAGNOSIS — M54.16 LUMBAR NEURITIS: ICD-10-CM

## 2022-08-02 PROCEDURE — 99214 OFFICE O/P EST MOD 30 MIN: CPT | Performed by: PHYSICAL MEDICINE & REHABILITATION

## 2022-08-02 RX ORDER — TOPIRAMATE 25 MG/1
TABLET ORAL
Qty: 90 TABLET | Refills: 1 | Status: SHIPPED | OUTPATIENT
Start: 2022-08-02 | End: 2022-10-31 | Stop reason: ALTCHOICE

## 2022-08-02 NOTE — LETTER
8/2/2022    Patient: Jose Akbar   YOB: 1970   Date of Visit: 8/2/2022     Laura Councilman, [address]   [address]  [address] [address]  [facility] [facility] [address]    Dear Laura Councilman, MD,      Thank you for referring Ms. Torie Magallon to [address] for evaluation. My notes for this consultation are attached. If you have questions, please do not hesitate to call me. I look forward to following your patient along with you.       Sincerely,    Melany Calloway MD

## 2022-08-04 ENCOUNTER — APPOINTMENT (OUTPATIENT)
Dept: PHYSICAL THERAPY | Age: 52
End: 2022-08-04
Attending: NURSE PRACTITIONER

## 2022-08-09 ENCOUNTER — APPOINTMENT (OUTPATIENT)
Dept: PHYSICAL THERAPY | Age: 52
End: 2022-08-09
Attending: NURSE PRACTITIONER

## 2022-08-11 ENCOUNTER — VIRTUAL VISIT (OUTPATIENT)
Dept: ORTHOPEDIC SURGERY | Age: 52
End: 2022-08-11
Payer: MEDICAID

## 2022-08-11 ENCOUNTER — APPOINTMENT (OUTPATIENT)
Dept: PHYSICAL THERAPY | Age: 52
End: 2022-08-11
Attending: NURSE PRACTITIONER

## 2022-08-11 DIAGNOSIS — M54.50 LUMBAR PAIN: Primary | ICD-10-CM

## 2022-08-11 DIAGNOSIS — M43.16 SPONDYLOLISTHESIS AT L4-L5 LEVEL: ICD-10-CM

## 2022-08-11 PROCEDURE — 99443 PR PHYS/QHP TELEPHONE EVALUATION 21-30 MIN: CPT | Performed by: NURSE PRACTITIONER

## 2022-08-11 NOTE — PROGRESS NOTES
History of Present Illness:    Consent: Denisse Weems, who was seen by telephone call, and/or her healthcare decision maker, is aware that this patient-initiated, Telehealth encounter on 8/11/2022 is a billable service, with coverage as determined by her insurance carrier. She is aware that she may receive a bill and has provided verbal consent to proceed: Yes. The provider was located at [facility] office and the patient was located at their home. No one else participated in the visit with the patient. The platform used was telephone call    The patient is a 25-year-old female who has back pain and right lateral thigh pain. She is currently in physical therapy. 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 Purpose of this visit is to fill out her FMLA form. Physical Exam: Patient is a 25-year-old female who is alert and oriented. Spoke with fluency. She did not appear to be in distress. Assessment & Plan: This is a patient who has lumbar spondylolisthesis that gives her back pain and leg pain. I have filled out her FMLA paperwork. Dr. Caden Lawrence will review it and sign it if appropriate. We will let her  for pickup. Diagnoses and all orders for this visit:    1. Lumbar pain    2. Spondylolisthesis at L4-L5 level        We discussed the expected course, resolution and complications of the diagnosis(es) in detail. Medication risks, benefits, costs, interactions, and alternatives were discussed as indicated. I advised her to contact the office if her condition worsens, changes or fails to improve as anticipated. For emergencies or worsening neurological symptoms the patient was instructed to call 911. She expressed understanding with the diagnosis(es) and plan. Denisse Weems is a 45 y.o. female being evaluated by a video visit encounter for concerns as above.   A caregiver was present when appropriate. Due to this being a TeleHealth encounter (During RXRUD-79 public health emergency), evaluation of the following organ systems was limited: Vitals/Constitutional/EENT/Resp/CV/GI//MS/Neuro/Skin/Heme-Lymph-Imm. Pursuant to the emergency declaration under the [address] [place] [address] authority and the [employer] Act, this Virtual  Visit was conducted, with patient's (and/or legal guardian's) consent, to reduce the patient's risk of exposure to COVID-19 and provide necessary medical care. CPT Codes 89201-77814 for Established Patients may apply to this Telehealth Visit  Time-based coding, delete if not needed: I spent at least 15 minutes with this established patient, and >50% of the time was spent counseling and/or coordinating care regarding   back and leg pain and FMLA paperwork start time: 3:20 PM and Stop time: 3:42 PM.        Due to this being a TeleHealth evaluation, many elements of the physical examination are unable to be assessed. Pursuant to the emergency declaration under the [address] [place] [address] authority and the [employer] Act, this Virtual  Visit was conducted, with patient's consent, to reduce the patient's risk of exposure to COVID-19 and provide continuity of care for an established patient. Services were provided through a video synchronous discussion virtually to substitute for in-person clinic visit.     Jacquelyn Melton NP

## 2022-08-15 ENCOUNTER — APPOINTMENT (OUTPATIENT)
Dept: PHYSICAL THERAPY | Age: 52
End: 2022-08-15
Attending: NURSE PRACTITIONER

## 2022-08-15 ENCOUNTER — TELEPHONE (OUTPATIENT)
Dept: ORTHOPEDIC SURGERY | Age: 52
End: 2022-08-15

## 2022-08-15 NOTE — TELEPHONE ENCOUNTER
Patient called for Telly Mcguire. Patient said that she had a VV appt with ANDREW Madrigal on 8/11/22 in order to have the [facility] paperwork. That she did to the VV appt and all that was needed was for  to sign the form. Patient would like to know if the form has been signed by Telly Mcguire, because she needed the form by this Past Friday 8/12/22. The form was received at the [facility] location. Patient is requesting a call back at  [facility]. [phone].

## 2022-08-25 ENCOUNTER — TELEPHONE (OUTPATIENT)
Dept: ORTHOPEDIC SURGERY | Age: 52
End: 2022-08-25

## 2022-08-25 NOTE — TELEPHONE ENCOUNTER
Please advise. Patient was last seen by you 8/2/2022 and then seen by Chen Jurado on 8/11/2022 for a FMLA form completion and her next appointment is 9/2/2022.

## 2022-08-25 NOTE — TELEPHONE ENCOUNTER
Patient is requesting a work note stating she will be out of work today and tomorrow, because of the back pain she is having. Patient can be reached at [phone].

## 2022-08-26 NOTE — TELEPHONE ENCOUNTER
Called patient [phone] and verified her name and date of birth. I informed her that her FMLA form covers her for work and she should not need a note unless the job requires it. Patient stated thank you and she did not know. No further action is needed at this time.

## 2022-09-15 NOTE — PROGRESS NOTES
[facility] at [address] [place] [address] [address], [address] [facility], [facility] [address] [facility] [address]  Phone: [phone]      Fax:  [phone]    Discharge Summary    Patient name: Jodie Alegre Start of Care: 2022   Referral source: Shabana Rausch NP : [id number]               Medical Diagnosis: Other low back pain [M54.59]  Bilateral sciatica [M54.31, M54.32]  Payor: Jessica Chaudhry / Plan: Mikal Stark / Product Type: Managed Care Medicaid /  Onset Date: On or about 22               Treatment Diagnosis: Low Back Pain   Prior Hospitalization: see medical history Provider#: [id number]   Medications: Verified on Patient summary List   Comorbidities: Asthma, Hearing Impaired  Prior Level of Function: Able to walk stairs without use of hand rails. Able to do all usual work without pain. Able to stand more than 8 hrs and work or do house work with no trouble      Visits from [facility] of Care: 6    Missed Visits: 3    Reporting Period : 2022 to 2022    Updated Goals: to be achieved in 10 treatments:  1. Pt will ambulate 500' (I) with little discomfort for carryover to improved walking tolerance with work                          Status at last note/certification: Little difficulty with walking distance              Current Status: Not met, unable to assess d/t self-discharge         2.   Pt will ambulate 1/2 mine on TM with a good pace and little to no difficulty for progress to walking without difficulty in the community                           Status at last note/certification:              Current Status:     Not met, unable to assess d/t self-discharge               3.  Pt will demonstrate stoop and lift of 20# and lift and carry of 20# for 100' with no difficulty to perform usual lifting at work with no difficulty                            Status at last note/certification:              Current Status:Not met, unable to assess d/t self-discharge             Assessment/ Summary of Care: Patient has shown good progress with this treatment program. Pain as of last visit was 0/10. Patient has shown decreased pain and increased strength, mobility and work tolerance. Patient reports 100% improvement with overall involvement but states at some times it is up and down. FOTO score is 61. All STG/LTGs achieved as identified below. Patient has not made any further appointments and will be discharged at this time.        RECOMMENDATIONS:  [x]Discontinue therapy: []Patient has reached or is progressing toward set goals      [x]Patient is non-compliant or has abdicated      []Due to lack of appreciable progress towards set [address] [facility], PTA 9/15/2022 9:49 AM  Efrain Lester, PT 9/18/2022

## 2022-09-21 ENCOUNTER — OFFICE VISIT (OUTPATIENT)
Dept: FAMILY MEDICINE CLINIC | Age: 52
End: 2022-09-21
Payer: MEDICAID

## 2022-09-21 VITALS
OXYGEN SATURATION: 100 % | SYSTOLIC BLOOD PRESSURE: 138 MMHG | TEMPERATURE: 97.4 F | RESPIRATION RATE: 16 BRPM | BODY MASS INDEX: 50.08 KG/M2 | WEIGHT: 273.8 LBS | DIASTOLIC BLOOD PRESSURE: 88 MMHG | HEART RATE: 68 BPM

## 2022-09-21 DIAGNOSIS — R73.01 IMPAIRED FASTING BLOOD SUGAR: Primary | ICD-10-CM

## 2022-09-21 DIAGNOSIS — R21 RASH: ICD-10-CM

## 2022-09-21 PROCEDURE — 99213 OFFICE O/P EST LOW 20 MIN: CPT | Performed by: FAMILY MEDICINE

## 2022-09-21 RX ORDER — LISINOPRIL AND HYDROCHLOROTHIAZIDE 20; 25 MG/1; MG/1
1 TABLET ORAL DAILY
COMMUNITY
Start: 2022-08-14 | End: 2022-09-21 | Stop reason: SDUPTHER

## 2022-09-21 RX ORDER — FLUTICASONE PROPIONATE 50 MCG
SPRAY, SUSPENSION (ML) NASAL
COMMUNITY
Start: 2022-08-14 | End: 2022-09-21 | Stop reason: SDUPTHER

## 2022-09-21 RX ORDER — FLUTICASONE PROPIONATE 50 MCG
SPRAY, SUSPENSION (ML) NASAL
Qty: 1 EACH | Refills: 0 | Status: SHIPPED | OUTPATIENT
Start: 2022-09-21 | End: 2022-10-10

## 2022-09-21 RX ORDER — LISINOPRIL AND HYDROCHLOROTHIAZIDE 20; 25 MG/1; MG/1
1 TABLET ORAL DAILY
Qty: 90 TABLET | Refills: 1 | Status: SHIPPED | OUTPATIENT
Start: 2022-09-21

## 2022-09-21 RX ORDER — METOPROLOL TARTRATE 100 MG/1
100 TABLET ORAL DAILY
Qty: 90 TABLET | Refills: 1 | Status: SHIPPED | OUTPATIENT
Start: 2022-09-21

## 2022-09-21 RX ORDER — CLOTRIMAZOLE AND BETAMETHASONE DIPROPIONATE 10; .64 MG/G; MG/G
CREAM TOPICAL
Qty: 30 G | Refills: 0 | Status: SHIPPED | OUTPATIENT
Start: 2022-09-21 | End: 2022-11-03

## 2022-09-21 NOTE — PROGRESS NOTES
HISTORY OF PRESENT ILLNESS  Geeta Mcdonald is a 45 y.o. female. HPI: Here with a concern of elevated blood sugar. Postprandial sugar is around 170. She has a history of prediabetes. Yesterday started prednisone for her back. Probably the reason for elevated blood sugar. Advised to keep the blood sugar log fasting and 1 hour postprandial.  Currently sitting comfortable. No hyper or hypoglycemic symptoms. Denies any headache, dizziness, no chest pain or trouble breathing, no arm or leg weakness. No nausea or vomiting, no weight or appetite changes, no mood changes . No urine or bowel complains, no palpitation, no diaphoresis. No abdominal pain. No cold or cough. No leg swelling. No fever. No sleep trouble. Visit Vitals  /88 (BP 1 Location: Left upper arm, BP Patient Position: Sitting, BP Cuff Size: Adult)   Pulse 68   Temp 97.4 °F (36.3 °C) (Temporal)   Resp 16   Wt 273 lb 12.8 oz (124.2 kg)   SpO2 100%   BMI 50.08 kg/m²     Review medication list, vitals, problem list,allergies. ROS: See HPI    Physical Exam  Cardiovascular:      Rate and Rhythm: Normal rate. Pulmonary:      Effort: Pulmonary effort is normal. No respiratory distress. Abdominal:      Palpations: Abdomen is soft. Tenderness: There is no abdominal tenderness. Neurological:      Mental Status: She is alert and oriented to person, place, and time. Psychiatric:         Behavior: Behavior normal.       ASSESSMENT and PLAN    ICD-10-CM ICD-9-CM    1. Impaired fasting blood sugar: Currently elevated blood sugar due to taking prednisone. Advised to complete it and keep the monitoring for blood sugar. If fasting sugar goes above 130 and postprandial about 200 she can call back to the office and we can consider giving glipizide while she is on prednisone. Discussed the low-carb diet. Given handout for diet. She understood and agreed with it. She has no hyper or hypoglycemic symptom and will follow-up in a month.  She will repeat labs before next visit. R73.01 790.21 HEMOGLOBIN A1C WITH EAG      METABOLIC PANEL, COMPREHENSIVE      2. Rash  R21 782.1 clotrimazole-betamethasone (LOTRISONE) topical cream      Pt understood and agree with the plan     Follow-up and Dispositions    Return in about 1 month (around 10/21/2022). Please note that this dictation was completed with [employer], the computer voice recognition software. Quite often unanticipated grammatical, syntax, homophones, and other interpretive errors are inadvertently transcribed by the computer software. Please disregard these errors. Please excuse any errors that have escaped final proofreading.

## 2022-09-21 NOTE — PROGRESS NOTES
1. Have you been to the ER, urgent care clinic since your last visit? Hospitalized since your last visit? No    2. Have you seen or consulted any other health care providers outside of the [address] [place] [address] since your last visit? Include any pap smears or colon screening. No    Chief Complaint   Patient presents with    Blood sugar problem     Elevated blood sugars - took 5 prednisone pills yesterday and did not take prednisone today.     Hypertension    Foot Swelling    Pelvic Pain     Follow-up

## 2022-09-28 ENCOUNTER — OFFICE VISIT (OUTPATIENT)
Dept: ORTHOPEDIC SURGERY | Age: 52
End: 2022-09-28
Payer: MEDICAID

## 2022-09-28 VITALS
WEIGHT: 277 LBS | HEART RATE: 65 BPM | OXYGEN SATURATION: 97 % | TEMPERATURE: 98.3 F | RESPIRATION RATE: 18 BRPM | BODY MASS INDEX: 50.66 KG/M2

## 2022-09-28 DIAGNOSIS — M54.32 LEFT SIDED SCIATICA: ICD-10-CM

## 2022-09-28 DIAGNOSIS — M54.42 CHRONIC BILATERAL LOW BACK PAIN WITH BILATERAL SCIATICA: ICD-10-CM

## 2022-09-28 DIAGNOSIS — M43.16 SPONDYLOLISTHESIS AT L4-L5 LEVEL: ICD-10-CM

## 2022-09-28 DIAGNOSIS — M54.42 LEFT-SIDED LOW BACK PAIN WITH LEFT-SIDED SCIATICA, UNSPECIFIED CHRONICITY: ICD-10-CM

## 2022-09-28 DIAGNOSIS — M54.16 LUMBAR NEURITIS: ICD-10-CM

## 2022-09-28 DIAGNOSIS — M54.41 CHRONIC BILATERAL LOW BACK PAIN WITH BILATERAL SCIATICA: ICD-10-CM

## 2022-09-28 DIAGNOSIS — G89.29 CHRONIC BILATERAL LOW BACK PAIN WITH BILATERAL SCIATICA: ICD-10-CM

## 2022-09-28 DIAGNOSIS — M54.50 LUMBAR PAIN: Primary | ICD-10-CM

## 2022-09-28 PROCEDURE — 99214 OFFICE O/P EST MOD 30 MIN: CPT | Performed by: PHYSICAL MEDICINE & REHABILITATION

## 2022-09-28 RX ORDER — IBUPROFEN 800 MG/1
TABLET ORAL
COMMUNITY
Start: 2022-09-15 | End: 2022-10-31 | Stop reason: ALTCHOICE

## 2022-09-28 RX ORDER — CYCLOBENZAPRINE HCL 10 MG
TABLET ORAL
COMMUNITY
Start: 2022-09-18 | End: 2022-10-31 | Stop reason: ALTCHOICE

## 2022-09-28 RX ORDER — DULOXETIN HYDROCHLORIDE 30 MG/1
30 CAPSULE, DELAYED RELEASE ORAL
Qty: 30 CAPSULE | Refills: 1 | Status: SHIPPED | OUTPATIENT
Start: 2022-09-28 | End: 2022-10-31 | Stop reason: ALTCHOICE
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
## 2022-09-28 NOTE — LETTER
9/28/2022    Patient: Geovanna Ansari   YOB: 1970   Date of Visit: 9/28/2022     [address]   [address]  [address] [place] [address]  [address] [facility] [address]    Dear Mica Smith MD,      Thank you for referring Ms. Keke Gauthier to [facility] [address] for evaluation. My notes for this consultation are attached. If you have questions, please do not hesitate to call me. I look forward to following your patient along with you.       Sincerely,    Martina Cassidy MD

## 2022-10-01 RX ORDER — ACETAMINOPHEN 500 MG
TABLET ORAL
Qty: 90 CAPSULE | Refills: 4 | Status: SHIPPED | OUTPATIENT
Start: 2022-10-01

## 2022-10-09 RX ORDER — IBUPROFEN 800 MG/1
TABLET ORAL
Qty: 45 TABLET | OUTPATIENT
Start: 2022-10-09

## 2022-10-10 RX ORDER — FLUTICASONE PROPIONATE 50 MCG
SPRAY, SUSPENSION (ML) NASAL
Qty: 1 EACH | Refills: 1 | Status: SHIPPED | OUTPATIENT
Start: 2022-10-10
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
## 2022-10-31 ENCOUNTER — OFFICE VISIT (OUTPATIENT)
Dept: ORTHOPEDIC SURGERY | Age: 52
End: 2022-10-31
Payer: MEDICAID

## 2022-10-31 VITALS
BODY MASS INDEX: 49.75 KG/M2 | TEMPERATURE: 98.1 F | OXYGEN SATURATION: 99 % | WEIGHT: 272 LBS | HEART RATE: 61 BPM | RESPIRATION RATE: 19 BRPM

## 2022-10-31 DIAGNOSIS — M43.16 SPONDYLOLISTHESIS AT L4-L5 LEVEL: ICD-10-CM

## 2022-10-31 DIAGNOSIS — M54.16 LUMBAR NEURITIS: ICD-10-CM

## 2022-10-31 DIAGNOSIS — M54.50 LUMBAR PAIN: Primary | ICD-10-CM

## 2022-10-31 DIAGNOSIS — M54.42 CHRONIC BILATERAL LOW BACK PAIN WITH BILATERAL SCIATICA: ICD-10-CM

## 2022-10-31 DIAGNOSIS — M54.41 CHRONIC BILATERAL LOW BACK PAIN WITH BILATERAL SCIATICA: ICD-10-CM

## 2022-10-31 DIAGNOSIS — G89.29 CHRONIC BILATERAL LOW BACK PAIN WITH BILATERAL SCIATICA: ICD-10-CM

## 2022-10-31 PROCEDURE — 99214 OFFICE O/P EST MOD 30 MIN: CPT | Performed by: PHYSICAL MEDICINE & REHABILITATION

## 2022-10-31 RX ORDER — CYCLOBENZAPRINE HCL 10 MG
TABLET ORAL
Qty: 30 TABLET | Refills: 0 | Status: CANCELLED | OUTPATIENT
Start: 2022-10-31

## 2022-10-31 RX ORDER — CYCLOBENZAPRINE HCL 10 MG
10 TABLET ORAL
Qty: 30 TABLET | Refills: 0 | Status: SHIPPED | OUTPATIENT
Start: 2022-10-31

## 2022-10-31 RX ORDER — ESTRADIOL 0.1 MG/G
CREAM VAGINAL
COMMUNITY
Start: 2022-09-30

## 2022-10-31 RX ORDER — PREGABALIN 150 MG/1
150 CAPSULE ORAL 2 TIMES DAILY
Qty: 60 CAPSULE | Refills: 1 | Status: SHIPPED | OUTPATIENT
Start: 2022-10-31 | End: 2022-11-03

## 2022-10-31 NOTE — LETTER
10/31/2022    Patient: Oziel Jordan   YOB: 1970   Date of Visit: 10/31/2022     [address]   [address]  [address] [place] [address]  [address] [place]    Dear Casey Whaley MD,      Thank you for referring Ms. Jerome Salgado to [facility] [address] for evaluation. My notes for this consultation are attached. If you have questions, please do not hesitate to call me. I look forward to following your patient along with you.       Sincerely,    Suraj Casarez MD

## 2022-11-03 ENCOUNTER — VIRTUAL VISIT (OUTPATIENT)
Dept: ORTHOPEDIC SURGERY | Age: 52
End: 2022-11-03
Payer: MEDICAID

## 2022-11-03 DIAGNOSIS — R21 RASH: ICD-10-CM

## 2022-11-03 DIAGNOSIS — M54.50 LUMBAR PAIN: Primary | ICD-10-CM

## 2022-11-03 DIAGNOSIS — M43.16 SPONDYLOLISTHESIS AT L4-L5 LEVEL: ICD-10-CM

## 2022-11-03 DIAGNOSIS — M54.42 LEFT-SIDED LOW BACK PAIN WITH LEFT-SIDED SCIATICA, UNSPECIFIED CHRONICITY: ICD-10-CM

## 2022-11-03 PROCEDURE — 99443 PR PHYS/QHP TELEPHONE EVALUATION 21-30 MIN: CPT | Performed by: NURSE PRACTITIONER

## 2022-11-03 RX ORDER — CLOTRIMAZOLE AND BETAMETHASONE DIPROPIONATE 10; .64 MG/G; MG/G
CREAM TOPICAL
Qty: 30 G | Refills: 0 | Status: SHIPPED | OUTPATIENT
Start: 2022-11-03

## 2022-11-03 NOTE — PROGRESS NOTES
History of Present Illness:    Consent: Ti Mary, who was seen by telephone call and/or her healthcare decision maker, is aware that this patient-initiated, Telehealth encounter on 11/3/2022 is a billable service, with coverage as determined by her insurance carrier. She is aware that she may receive a bill and has provided verbal consent to proceed: Yes. The provider was located at the [facility] office and the patient was located at their home. No one else participated in the visit with the patient. The platform used was telephone call    Patient is a 71-year-old female who has low back pain and left leg pain. Her pain keeps her from working at times. The purpose of this visit is to fill out her FMLA paperwork. Physical Exam: Patient is a 71-year-old female who is alert and oriented. She spoke with fluency. She did not appear to be in distress. Assessment & Plan: This is a patient has back pain and left leg pain. We have completed her FMLA paperwork. Maday Khanhrosalind will review it and  It if appropriate. Our staff will let her know when it is ready for pickup. Diagnoses and all orders for this visit:    1. Lumbar pain    2. Spondylolisthesis at L4-L5 level    3. Left-sided low back pain with left-sided sciatica, unspecified chronicity             We discussed the expected course, resolution and complications of the diagnosis(es) in detail. Medication risks, benefits, costs, interactions, and alternatives were discussed as indicated. I advised her to contact the office if her condition worsens, changes or fails to improve as anticipated. For emergencies or worsening neurological symptoms the patient was instructed to call 911. She expressed understanding with the diagnosis(es) and plan. Follow-up and Dispositions    Return for As scheduled with Dr. Jamal Singh.                Ti Mary is a 45 y.o. female being evaluated by a video visit encounter for concerns as above.  A caregiver was present when appropriate. Due to this being a TeleHealth encounter (During [facility] public health emergency), evaluation of the following organ systems was limited: Vitals/Constitutional/EENT/Resp/CV/GI//MS/Neuro/Skin/Heme-Lymph-Imm. Pursuant to the emergency declaration under the [address] [place] [address] authority and the [employer] Act, this Virtual  Visit was conducted, with patient's (and/or legal guardian's) consent, to reduce the patient's risk of exposure to COVID-19 and provide necessary medical care. CPT Codes 75790-34883 for Established Patients may apply to this Telehealth Visit  Time-based coding, delete if not needed: I spent at least 15 minutes with this established patient, and >50% of the time was spent counseling and/or coordinating care regarding   back and left leg pain and form completion start time: 12:40 PM and Stop time: 1:04 PM.        Due to this being a TeleHealth evaluation, many elements of the physical examination are unable to be assessed. Pursuant to the emergency declaration under the [address] [place] [address] authority and the [employer] Act, this Virtual  Visit was conducted, with patient's consent, to reduce the patient's risk of exposure to COVID-19 and provide continuity of care for an established patient. Services were provided through a video synchronous discussion virtually to substitute for in-person clinic visit.     Javier Paniagua NP

## 2022-11-09 ENCOUNTER — HOSPITAL ENCOUNTER (OUTPATIENT)
Dept: LAB | Age: 52
Discharge: HOME | End: 2022-11-09

## 2022-11-09 LAB — SENTARA SPECIMEN COL,SENBCF: NORMAL

## 2022-11-09 PROCEDURE — 99001 SPECIMEN HANDLING PT-LAB: CPT

## 2022-11-10 LAB
A-G RATIO,AGRAT: 1.2 RATIO (ref 1.1–2.6)
ALBUMIN SERPL-MCNC: 4 G/DL (ref 3.5–5)
ALP SERPL-CCNC: 78 U/L (ref 25–115)
ALT SERPL-CCNC: 17 U/L (ref 5–40)
ANION GAP SERPL CALC-SCNC: 15 MMOL/L (ref 3–15)
AST SERPL W P-5'-P-CCNC: 19 U/L (ref 10–37)
AVG GLU, 10930: 130 MG/DL (ref 91–123)
BILIRUB SERPL-MCNC: 0.5 MG/DL (ref 0.2–1.2)
BUN SERPL-MCNC: 11 MG/DL (ref 6–22)
CALCIUM SERPL-MCNC: 9.5 MG/DL (ref 8.4–10.5)
CHLORIDE SERPL-SCNC: 98 MMOL/L (ref 98–110)
CO2 SERPL-SCNC: 30 MMOL/L (ref 20–32)
CREAT SERPL-MCNC: 0.7 MG/DL (ref 0.5–1.2)
GLOBULIN,GLOB: 3.3 G/DL (ref 2–4)
GLOMERULAR FILTRATION RATE: >60 ML/MIN/1.73 SQ.M.
GLUCOSE SERPL-MCNC: 77 MG/DL (ref 70–99)
HBA1C MFR BLD HPLC: 6.2 % (ref 4.8–5.6)
POTASSIUM SERPL-SCNC: 3.6 MMOL/L (ref 3.5–5.5)
PROT SERPL-MCNC: 7.3 G/DL (ref 6.4–8.3)
SODIUM SERPL-SCNC: 143 MMOL/L (ref 133–145)

## 2022-11-11 ENCOUNTER — TELEPHONE (OUTPATIENT)
Dept: FAMILY MEDICINE CLINIC | Age: 52
End: 2022-11-11

## 2022-11-11 NOTE — PROGRESS NOTES
Called pt. Discussed A1c in prediabetic  range . Work on life style modification. Diet modification and exercise as tolerated. Other labs stable . Will discuss further on next week viost. Pt understood and agree with the plan. Called pt as got call from office That pt is too concern about A1c and considering to go to Er.

## 2022-11-12 NOTE — TELEPHONE ENCOUNTER
Hilda Vance MD   11/11/2022  3:08 PM EST Back to Top      Called pt. Discussed A1c in prediabetic  range . Work on life style modification. Diet modification and exercise as tolerated. Other labs stable . Will discuss further on next week viost. Pt understood and agree with the plan. Called pt as got call from office That pt is too concern about A1c and considering to go to Er.

## 2022-11-15 NOTE — PROGRESS NOTES
1. \"Have you been to the ER, urgent care clinic since your last visit? Hospitalized since your last visit? \" No    2. \"Have you seen or consulted any other health care providers outside of the [address] [place] [address] since your last visit? \" No     3. For patients aged 39-70: Has the patient had a colonoscopy / FIT/ Cologuard? No. Patient has appointment with [facility] on 11/30/22 for colonoscopy consult. If the patient is female:    4. For patients aged 41-77: Has the patient had a mammogram within the past 2 years? No      5. For patients aged 21-65: Has the patient had a pap smear? 1 year ago [facility] Val[facility]uel[facility]. Chief Complaint   Patient presents with    Blood sugar problem    Hypertension    Rash    Foot Swelling    Pelvic Pain     Follow-up    Urinary Frequency    Medication Evaluation     Requests Rx to help keep her calm during airplane trip. Also requests Rx for Ibuprofen.

## 2022-11-16 ENCOUNTER — HOSPITAL ENCOUNTER (OUTPATIENT)
Dept: LAB | Age: 52
Discharge: HOME | End: 2022-11-16

## 2022-11-16 ENCOUNTER — OFFICE VISIT (OUTPATIENT)
Dept: FAMILY MEDICINE CLINIC | Age: 52
End: 2022-11-16
Payer: MEDICAID

## 2022-11-16 VITALS
BODY MASS INDEX: 50.02 KG/M2 | RESPIRATION RATE: 16 BRPM | TEMPERATURE: 97.3 F | DIASTOLIC BLOOD PRESSURE: 78 MMHG | WEIGHT: 271.8 LBS | HEIGHT: 62 IN | SYSTOLIC BLOOD PRESSURE: 118 MMHG

## 2022-11-16 DIAGNOSIS — I10 ESSENTIAL HYPERTENSION: ICD-10-CM

## 2022-11-16 DIAGNOSIS — F41.9 ANXIETY: ICD-10-CM

## 2022-11-16 DIAGNOSIS — E66.01 MORBID OBESITY WITH BMI OF 45.0-49.9, ADULT (HCC): ICD-10-CM

## 2022-11-16 DIAGNOSIS — R73.01 IMPAIRED FASTING BLOOD SUGAR: ICD-10-CM

## 2022-11-16 DIAGNOSIS — R35.0 URINARY FREQUENCY: Primary | ICD-10-CM

## 2022-11-16 LAB
BILIRUB UR QL STRIP: NEGATIVE
GLUCOSE UR-MCNC: NEGATIVE MG/DL
KETONES P FAST UR STRIP-MCNC: NEGATIVE MG/DL
PH UR STRIP: 6 [PH] (ref 4.6–8)
PROT UR QL STRIP: NEGATIVE
SENTARA SPECIMEN COL,SENBCF: NORMAL
SP GR UR STRIP: 1.02 (ref 1–1.03)
UA UROBILINOGEN AMB POC: NORMAL (ref 0.2–1)
URINALYSIS CLARITY POC: NORMAL
URINALYSIS COLOR POC: YELLOW
URINE BLOOD POC: NEGATIVE
URINE LEUKOCYTES POC: NORMAL
URINE NITRITES POC: NEGATIVE

## 2022-11-16 PROCEDURE — 99214 OFFICE O/P EST MOD 30 MIN: CPT | Performed by: FAMILY MEDICINE

## 2022-11-16 PROCEDURE — 81003 URINALYSIS AUTO W/O SCOPE: CPT | Performed by: FAMILY MEDICINE

## 2022-11-16 PROCEDURE — 99001 SPECIMEN HANDLING PT-LAB: CPT

## 2022-11-16 PROCEDURE — 3078F DIAST BP <80 MM HG: CPT | Performed by: FAMILY MEDICINE

## 2022-11-16 PROCEDURE — 3074F SYST BP LT 130 MM HG: CPT | Performed by: FAMILY MEDICINE

## 2022-11-16 RX ORDER — ALPRAZOLAM 0.5 MG/1
0.5 TABLET ORAL
Qty: 4 TABLET | Refills: 0 | Status: SHIPPED | OUTPATIENT
Start: 2022-11-16

## 2022-11-16 RX ORDER — ALPRAZOLAM 0.5 MG/1
0.5 TABLET ORAL
Qty: 4 TABLET | Refills: 0 | Status: SHIPPED | OUTPATIENT
Start: 2022-11-16 | End: 2022-11-16 | Stop reason: SDUPTHER

## 2022-11-16 NOTE — PROGRESS NOTES
HISTORY OF PRESENT ILLNESS  Wilmer Chavarria is a 45 y.o. female. HPI: Here for routine follow-up. Prediabetes. A1c went up to 6.2. Discussed importance of lifestyle and diet modification which she is working on. Discussed high BMI and importance of weight loss. Again she is working on lost almost 5 to 6 pounds in last 3 months. Complaining of urine frequency but no burning or urgency. UA showed no signs of infection but the urine color was turbid. Will send for culture. Flying  to [facility] for her [place]. Flying anxiety. Giving Xanax to take as needed. Sitting comfortable without any acute distress  Denies any headache, dizziness, no chest pain or trouble breathing, no arm or leg weakness. No nausea or vomiting, no weight or appetite changes, no mood changes . No bowel complains, no palpitation, no diaphoresis. No abdominal pain. No cold or cough. No leg swelling. No fever. No sleep trouble. Visit Vitals  /78 (BP 1 Location: Left upper arm, BP Patient Position: Sitting, BP Cuff Size: Adult)   Temp 97.3 °F (36.3 °C) (Temporal)   Resp 16   Ht 5' 2\" (1.575 m)   Wt 271 lb 12.8 oz (123.3 kg)   BMI 49.71 kg/m²     Review medication list, vitals, problem list,allergies. Lab Results   Component Value Date/Time    WBC 7.9 01/19/2022 04:00 PM    HGB 13.7 01/19/2022 04:00 PM    HCT 42.1 01/19/2022 04:00 PM    PLATELET 810 03/65/9372 04:00 PM    MCV 89 01/19/2022 04:00 PM     Lab Results   Component Value Date/Time    Sodium 143 11/09/2022 03:27 PM    Potassium 3.6 11/09/2022 03:27 PM    Chloride 98 11/09/2022 03:27 PM    CO2 30 11/09/2022 03:27 PM    Anion gap 15.0 11/09/2022 03:27 PM    Glucose 77 11/09/2022 03:27 PM    BUN 11 11/09/2022 03:27 PM    Creatinine 0.7 11/09/2022 03:27 PM    BUN/Creatinine ratio 14 04/16/2015 01:43 AM    GFR est AA >60 04/16/2015 01:43 AM    GFR est non-AA >60 04/16/2015 01:43 AM    Calcium 9.5 11/09/2022 03:27 PM    Bilirubin, total 0.5 11/09/2022 03:27 PM    Alk. phosphatase 78 11/09/2022 03:27 PM    Protein, total 7.3 11/09/2022 03:27 PM    Albumin 4.0 11/09/2022 03:27 PM    Globulin 3.3 11/09/2022 03:27 PM    A-G Ratio 1.2 11/09/2022 03:27 PM    ALT (SGPT) 17 11/09/2022 03:27 PM    AST (SGOT) 19 11/09/2022 03:27 PM     Lab Results   Component Value Date/Time    Cholesterol, total 141 01/19/2022 04:00 PM    HDL Cholesterol 49 01/19/2022 04:00 PM    LDL, calculated 80 01/19/2022 04:00 PM    VLDL, calculated 12 01/19/2022 04:00 PM    Triglyceride 60 01/19/2022 04:00 PM    CHOL/HDL Ratio 2.7 06/15/2010 12:20 PM     Lab Results   Component Value Date/Time    TSH 1.99 01/19/2022 04:00 PM     Lab Results   Component Value Date/Time    Hemoglobin A1c 6.2 (H) 11/09/2022 03:27 PM    Hemoglobin A1c (POC) 5.9 09/08/2021 09:32 AM     Lab Results   Component Value Date/Time    VITAMIN D, 25-HYDROXY 26.6 (L) 11/13/2018 08:15 AM         ROS: see HPI     Physical Exam  Constitutional:       General: She is not in acute distress. Cardiovascular:      Rate and Rhythm: Normal rate and regular rhythm. Heart sounds: Normal heart sounds. Abdominal:      General: Bowel sounds are normal.      Palpations: Abdomen is soft. Tenderness: There is no abdominal tenderness. Musculoskeletal:         General: No swelling. Cervical back: Neck supple. Neurological:      Mental Status: She is oriented to person, place, and time. Psychiatric:         Behavior: Behavior normal.       ASSESSMENT and PLAN    ICD-10-CM ICD-9-CM    1. Urinary frequency: UA showed no signs of infection. Still due to urine color turbid we will send culture R35.0 788.41 AMB POC URINALYSIS DIP STICK AUTO W/O MICRO      CULTURE, URINE      2. Impaired fasting blood sugar: Discussed continue diet and lifestyle modification. She wants to observe little bit longer.   If needed will consider starting metformin for prevention after next blood test.  She understood and agreed with R73.01 790.21 HEMOGLOBIN A1C WITH EAG METABOLIC PANEL, COMPREHENSIVE      CBC W/O DIFF      3. Essential hypertension:well controlled. Continue current dose of medication and low salt diet. Exercise as tolerated. I10 401.9       4. Morbid obesity with BMI of 45.0-49.9, adult [facility]): Working on diet and lifestyle modification. Discussed importance of weight loss which she understands very well and will be more compliant E66.01 278.01     Z68.42 V85.42       5. Anxiety: Mainly due to flying. Given medication to take as needed F41.9 300.00 ALPRAZolam (XANAX) 0.5 mg tablet      DISCONTINUED: ALPRAZolam (XANAX) 0.5 mg tablet      Patient understood agreed with the plan  Has appointment with GI on November 30 8 for colonoscopy consultation  Advised to take due immunization from the pharmacy if decides to  Follow-up and Dispositions    Return in about 3 months (around 2/16/2023). Please note that this dictation was completed with [employer], the computer voice recognition software. Quite often unanticipated grammatical, syntax, homophones, and other interpretive errors are inadvertently transcribed by the computer software. Please disregard these errors. Please excuse any errors that have escaped final proofreading.

## 2022-11-21 LAB — RESULT: NORMAL

## 2022-12-20 RX ORDER — ALBUTEROL SULFATE 90 UG/1
AEROSOL, METERED RESPIRATORY (INHALATION)
Qty: 18 G | Refills: 0 | Status: SHIPPED | OUTPATIENT
Start: 2022-12-20

## 2022-12-21 ENCOUNTER — TELEPHONE (OUTPATIENT)
Dept: FAMILY MEDICINE CLINIC | Age: 52
End: 2022-12-21

## 2022-12-21 NOTE — TELEPHONE ENCOUNTER
----- Message from Willard [place] sent at 12/20/2022  8:33 AM EST -----  Subject: Appointment Request    Reason for Call: Established Patient Appointment needed: Semi-Routine   Cough, Cold Symptoms    QUESTIONS    Reason for appointment request? No appointments available during search     Additional Information for Provider? pt has sinus drainage, cough, mucus   in throat  ---------------------------------------------------------------------------  --------------  Lisa Matos [facility]  [phone]; OK to leave message on voicemail  ---------------------------------------------------------------------------  --------------  SCRIPT ANSWERS  COVID Screen: Red

## 2022-12-27 ENCOUNTER — TELEPHONE (OUTPATIENT)
Dept: MAMMOGRAPHY | Age: 52
End: 2022-12-27

## 2022-12-27 ENCOUNTER — VIRTUAL VISIT (OUTPATIENT)
Dept: FAMILY MEDICINE CLINIC | Age: 52
End: 2022-12-27
Payer: MEDICAID

## 2022-12-27 DIAGNOSIS — J40 BRONCHITIS: Primary | ICD-10-CM

## 2022-12-27 DIAGNOSIS — J06.9 VIRAL UPPER RESPIRATORY TRACT INFECTION: ICD-10-CM

## 2022-12-27 PROCEDURE — 99213 OFFICE O/P EST LOW 20 MIN: CPT | Performed by: FAMILY MEDICINE

## 2022-12-27 NOTE — PROGRESS NOTES
Cholo Iraheta, who was evaluated through a synchronous (real-time) audio-video encounter, and/or her healthcare decision maker, is aware that it is a billable service, which includes applicable co-pays, with coverage as determined by her insurance carrier. She provided verbal consent to proceed and patient identification was verified. This visit was conducted pursuant to the emergency declaration under the [address] and the [employer] and [employer] Act. A caregiver was present when appropriate. Ability to conduct physical exam was limited. The patient was located at: Home: [place] [address]  The provider was located at: Facility (Appt Department): [address] [place] [address],Fourth Floor [address] [phone]    --Chelsea Osei MD on 12/27/2022 at 10:22 AM    Chief Complaint   Patient presents with    URI     SUBJECTIVE    The patient presents complaining of an 8 day history of a hoarse scratchy throat. She says she was tested for covid, flu, and strep 4 days into the illness at [facility] urgent care. No fevers. No nasal congestion but is having a lot of drainage. The patient also complains of a cough that is described as productive and yellow. The patient denies sinus pressure or headaches. Both ears have congestion. The patient denies any nausea or vomiting. The patient denies chest pain, chest tightness, or shortness of breath. The patient has tried taking Coricidin with significant relief. She has a history of allergies but is not on her allergy pill. She is on flonase. OBJECTIVE      General:  Alert, cooperative, well appearing, in no apparent distress. Lungs: Inspiratory and expiratory efforts are full and unlabored. ASSESSMENT / PLAN    ICD-10-CM ICD-9-CM    1. Bronchitis  J40 490       2.  Viral upper respiratory tract infection  J06.9 465.9         Bronchitis secondary to URI, viral - monitor for now. Tussinex 5mL po qhs prn cough for 5 nights. All chart history elements were reviewed by me at the time of the visit even though marked at time of note closure. Patient understands our medical plan. Patient has provided input and agrees with goals. Alternatives have been explained and offered. All questions answered. The patient is to call if condition worsens or fails to improve. RTC with PCP.

## 2022-12-28 ENCOUNTER — TELEPHONE (OUTPATIENT)
Dept: FAMILY MEDICINE CLINIC | Age: 52
End: 2022-12-28

## 2022-12-28 DIAGNOSIS — J40 BRONCHITIS: Primary | ICD-10-CM

## 2022-12-28 RX ORDER — HYDROCODONE POLISTIREX AND CHLORPHENIRAMINE POLISTIREX 10; 8 MG/5ML; MG/5ML
5 SUSPENSION, EXTENDED RELEASE ORAL
Qty: 25 ML | Refills: 0 | Status: SHIPPED | OUTPATIENT
Start: 2022-12-28 | End: 2023-01-02

## 2022-12-28 NOTE — TELEPHONE ENCOUNTER
Pt states that Dr Tono Irizarry was going to call in Tussinex 5mL po qhs prn cough for 5 nights. She states that there was nothing at the pharmacy when she went . [facility] [phone] please advise.

## 2022-12-28 NOTE — TELEPHONE ENCOUNTER
Patient called back she states Dr. Juliana Snider had also told her to call back if she wasn't getting better he could send in a z-marzena. Patient states she wants to know if Dr. Juliana Snider can go ahead and send in the z-marzena along with the tussionex.

## 2022-12-28 NOTE — TELEPHONE ENCOUNTER
Sent in [facility] and notified patient. Also she is not doing any worse and it has been less than 24 hours. I explained I would give this several days before we consider an antibiotic. She understands.

## 2023-02-09 ENCOUNTER — VIRTUAL VISIT (OUTPATIENT)
Dept: FAMILY MEDICINE CLINIC | Age: 53
End: 2023-02-09
Payer: MEDICAID

## 2023-02-09 DIAGNOSIS — I10 ESSENTIAL HYPERTENSION: ICD-10-CM

## 2023-02-09 DIAGNOSIS — Z12.31 ENCOUNTER FOR SCREENING MAMMOGRAM FOR MALIGNANT NEOPLASM OF BREAST: ICD-10-CM

## 2023-02-09 DIAGNOSIS — R73.01 IMPAIRED FASTING BLOOD SUGAR: ICD-10-CM

## 2023-02-09 DIAGNOSIS — R05.9 COUGH, UNSPECIFIED TYPE: ICD-10-CM

## 2023-02-09 DIAGNOSIS — R21 RASH: ICD-10-CM

## 2023-02-09 DIAGNOSIS — Z77.120 MOLD EXPOSURE: Primary | ICD-10-CM

## 2023-02-09 DIAGNOSIS — F41.9 ANXIETY: ICD-10-CM

## 2023-02-09 PROCEDURE — 99213 OFFICE O/P EST LOW 20 MIN: CPT | Performed by: FAMILY MEDICINE

## 2023-02-09 RX ORDER — CLOTRIMAZOLE AND BETAMETHASONE DIPROPIONATE 10; .64 MG/G; MG/G
CREAM TOPICAL
Qty: 30 G | Refills: 0 | Status: SHIPPED | OUTPATIENT
Start: 2023-02-09

## 2023-02-09 RX ORDER — FLUTICASONE PROPIONATE 50 MCG
SPRAY, SUSPENSION (ML) NASAL
Qty: 1 EACH | Refills: 1 | Status: SHIPPED | OUTPATIENT
Start: 2023-02-09

## 2023-02-09 RX ORDER — ALPRAZOLAM 0.5 MG/1
0.5 TABLET ORAL
Qty: 4 TABLET | Refills: 0 | Status: CANCELLED | OUTPATIENT
Start: 2023-02-09

## 2023-02-09 RX ORDER — METOPROLOL TARTRATE 100 MG/1
100 TABLET ORAL DAILY
Qty: 90 TABLET | Refills: 1 | Status: SHIPPED | OUTPATIENT
Start: 2023-02-09 | End: 2023-08-08

## 2023-02-09 RX ORDER — ALBUTEROL SULFATE 90 UG/1
AEROSOL, METERED RESPIRATORY (INHALATION)
Qty: 18 G | Refills: 0 | Status: SHIPPED | OUTPATIENT
Start: 2023-02-09

## 2023-02-09 RX ORDER — BENZONATATE 200 MG/1
CAPSULE ORAL
COMMUNITY
Start: 2022-12-20

## 2023-02-09 RX ORDER — LISINOPRIL AND HYDROCHLOROTHIAZIDE 20; 25 MG/1; MG/1
1 TABLET ORAL DAILY
Qty: 90 TABLET | Refills: 1 | Status: SHIPPED | OUTPATIENT
Start: 2023-02-09

## 2023-02-09 RX ORDER — IBUPROFEN 800 MG/1
TABLET ORAL
COMMUNITY
Start: 2023-02-08

## 2023-02-09 NOTE — PROGRESS NOTES
1. \"Have you been to the ER, urgent care clinic since your last visit? Hospitalized since your last visit? \" No    2. \"Have you seen or consulted any other health care providers outside of the [address] [place] [address] since your last visit? \" No     3. For patients aged 39-70: Has the patient had a colonoscopy / FIT/ Cologuard? No      If the patient is female:    4. For patients aged 41-77: Has the patient had a mammogram within the past 2 years? No      5. For patients aged 21-65: Has the patient had a pap smear? Yes - no Care Gap present  1 year ago - [facility]    Chief Complaint   Patient presents with    Cough     Productive with yellow mucus x 2 months    Other     Mold exposure - since moving into home in January 2022.     Urinary Frequency     Follow-up    Blood sugar problem    Hypertension    Anxiety    Medication Evaluation     Requesting Rx for Diflucan due to vaginal itching due to changing soap

## 2023-02-10 NOTE — PROGRESS NOTES
Lori Mancilla is a 47 y.o. female who was seen by synchronous (real-time) audio-video technology on 2/9/2023 for Cough (Productive with yellow mucus x 2 months), Other (Mold exposure - since moving into home in January 2022.), Urinary Frequency (Follow-up), Blood sugar problem, Hypertension, Anxiety, and Medication Evaluation (Requesting Rx for Diflucan due to vaginal itching due to changing soap)        Assessment & Plan:   Diagnoses and all orders for this visit:    1. Mold exposure: Since November having dry cough. Obtaining chest x-ray. Advised albuterol to take as needed. She does not use her daily albuterol. Using only as needed and that is few days a week  -     XR CHEST PA LAT; Future    2. Rash: On and off due to sweating under the breast area. Symptomatic treatment as needed  -     clotrimazole-betamethasone (LOTRISONE) topical cream; Use twice a day as needed for rash    3. Anxiety    4. Essential hypertension:well controlled. Continue current dose of medication and low salt diet. Exercise as tolerated. -     metoprolol tartrate (LOPRESSOR) 100 mg IR tablet; Take 1 Tablet by mouth daily for 180 days. 5. Encounter for screening mammogram for malignant neoplasm of breast  -     [facility] BI SCREENING INCL CAD; Future    6. Cough, unspecified type: According to patient since exposure to mold. Now changed to apartment. Feeling some improvement. Still on and off dry cough. Obtaining chest x-ray and albuterol as needed    7. Impaired fasting blood sugar: Advised diet and lifestyle modification.   Importance of weight loss been discussed    Other orders  -     albuterol (PROVENTIL HFA, VENTOLIN HFA, PROAIR HFA) 90 mcg/actuation inhaler; INHALE 2 PUFFS BY MOUTH EVERY 6 HOURS AS NEEDED FOR WHEEZING  -     fluticasone propionate (FLONASE) 50 mcg/actuation nasal spray; 1 spray each nostril daily as needed for rhinitis  -     lisinopril-hydroCHLOROthiazide (PRINZIDE, ZESTORETIC) 20-25 mg per tablet; Take 1 Tablet by mouth daily. Patient understood agreed with the plan  Follow-up and Dispositions    Return in about 3 months (around 5/9/2023). Please note that this dictation was completed with [employer], the computer voice recognition software. Quite often unanticipated grammatical, syntax, homophones, and other interpretive errors are inadvertently transcribed by the computer software. Please disregard these errors. Please excuse any errors that have escaped final proofreading. 712  Subjective:   Here for follow up. C/o mold exposure since November. Started dry cough. More at night time. No wheezing or shortness of breath. Now she moved out since 3 wks she is feeling better. No shortness of breath or chest congestion. Does not smoke. No history of asthma or COPD. No sick contact. Also getting rash under the breast area on and off and needed topical antifungal refill. History of hypertension. Not checking blood pressure at home. Currently asymptomatic. Taking medication with compliance and no side effects. Prediabetes. Diet and lifestyle modification been discussed. High BMI. Discussed importance of weight loss. Anxiety. Fairly stable. No concern.     Lab Results   Component Value Date/Time    WBC 7.9 01/19/2022 04:00 PM    HGB 13.7 01/19/2022 04:00 PM    HCT 42.1 01/19/2022 04:00 PM    PLATELET 014 87/72/8145 04:00 PM    MCV 89 01/19/2022 04:00 PM     Lab Results   Component Value Date/Time    Cholesterol, total 141 01/19/2022 04:00 PM    HDL Cholesterol 49 01/19/2022 04:00 PM    LDL, calculated 80 01/19/2022 04:00 PM    Triglyceride 60 01/19/2022 04:00 PM    CHOL/HDL Ratio 2.7 06/15/2010 12:20 PM     Lab Results   Component Value Date/Time    TSH 1.99 01/19/2022 04:00 PM    T4, Free 1.1 02/17/2010 04:02 PM      Lab Results   Component Value Date/Time    Sodium 143 11/09/2022 03:27 PM    Potassium 3.6 11/09/2022 03:27 PM    Chloride 98 11/09/2022 03:27 PM    CO2 30 11/09/2022 03:27 PM    Anion gap 15.0 11/09/2022 03:27 PM    Glucose 77 11/09/2022 03:27 PM    BUN 11 11/09/2022 03:27 PM    Creatinine 0.7 11/09/2022 03:27 PM    BUN/Creatinine ratio 14 04/16/2015 01:43 AM    GFR est AA >60 04/16/2015 01:43 AM    GFR est non-AA >60 04/16/2015 01:43 AM    Calcium 9.5 11/09/2022 03:27 PM      Lab Results   Component Value Date/Time    Sodium 143 11/09/2022 03:27 PM    Potassium 3.6 11/09/2022 03:27 PM    Chloride 98 11/09/2022 03:27 PM    CO2 30 11/09/2022 03:27 PM    Anion gap 15.0 11/09/2022 03:27 PM    Glucose 77 11/09/2022 03:27 PM    BUN 11 11/09/2022 03:27 PM    Creatinine 0.7 11/09/2022 03:27 PM    BUN/Creatinine ratio 14 04/16/2015 01:43 AM    GFR est AA >60 04/16/2015 01:43 AM    GFR est non-AA >60 04/16/2015 01:43 AM    Calcium 9.5 11/09/2022 03:27 PM    Bilirubin, total 0.5 11/09/2022 03:27 PM    ALT (SGPT) 17 11/09/2022 03:27 PM    Alk. phosphatase 78 11/09/2022 03:27 PM    Protein, total 7.3 11/09/2022 03:27 PM    Albumin 4.0 11/09/2022 03:27 PM    Globulin 3.3 11/09/2022 03:27 PM    A-G Ratio 1.2 11/09/2022 03:27 PM      Lab Results   Component Value Date/Time    Hemoglobin A1c 6.2 (H) 11/09/2022 03:27 PM    Hemoglobin A1c (POC) 5.9 09/08/2021 09:32 AM           Prior to Admission medications    Medication Sig Start Date End Date Taking?  Authorizing Provider   clotrimazole-betamethasone (LOTRISONE) topical cream Use twice a day as needed for rash 2/9/23  Yes Jacquelin Juares MD   ibuprofen (MOTRIN) 800 mg tablet  2/8/23  Yes Provider, Historical   benzonatate (TESSALON) 200 mg capsule TAKE (ORAL) 3 TIMES PER DAY (AS NEEDED FOR - COUGH) 12/20/22  Yes Provider, Historical   albuterol (PROVENTIL HFA, VENTOLIN HFA, PROAIR HFA) 90 mcg/actuation inhaler INHALE 2 PUFFS BY MOUTH EVERY 6 HOURS AS NEEDED FOR WHEEZING 2/9/23  Yes Jacquelin Juares MD   fluticasone propionate (FLONASE) 50 mcg/actuation nasal spray 1 spray each nostril daily as needed for rhinitis 2/9/23  Yes Jacquelin Juares MD lisinopril-hydroCHLOROthiazide (PRINZIDE, ZESTORETIC) 20-25 mg per tablet Take 1 Tablet by mouth daily. 2/9/23  Yes Karen Tinajero MD   metoprolol tartrate (LOPRESSOR) 100 mg IR tablet Take 1 Tablet by mouth daily for 180 days. 2/9/23 8/8/23 Yes Karen Tinajero MD   estradioL (ESTRACE) 0.01 % (0.1 mg/gram) vaginal cream INSERT ONE APPLICATORFUL VAGINALLY AT BEDTIME TWICE WEEKLY 9/30/22  Yes Provider, Historical   cyclobenzaprine (FLEXERIL) 10 mg tablet Take 1 Tablet by mouth three (3) times daily as needed for Muscle Spasm(s). 10/31/22  Yes Juice Kaye MD   cholecalciferol (VITAMIN D3) (2,000 UNITS /50 MCG) cap capsule TAKE 1 CAPSULE BY MOUTH DAILY 10/1/22  Yes Karen Tinajero MD   multivitamin (ONE A DAY) tablet Take 1 Tab by mouth daily. Yes Provider, Historical   albuterol (PROVENTIL HFA, VENTOLIN HFA, PROAIR HFA) 90 mcg/actuation inhaler INHALE 2 PUFFS BY MOUTH EVERY 6 HOURS AS NEEDED FOR WHEEZING 12/20/22 2/9/23  Karen Tinajero MD   ALPRAZolam Phyllistine Drape) 0.5 mg tablet Take 1 Tablet by mouth daily as needed for Anxiety. Take one pill 30 minutes prior flight , if needed to repeat may repeat in 30 minutes another dose. Max is 1 mg per day  Patient not taking: Reported on 2/9/2023 11/16/22 2/9/23  Karen Tinajero MD   clotrimazole-betamethasone (LOTRISONE) topical cream APPLY TWICE DAILY OVER AFFECTED AREA 11/3/22 2/9/23  Karen Tinajero MD   fluticasone propionate (FLONASE) 50 mcg/actuation nasal spray SHAKE LIQUID AND USE 1 SPRAY IN EACH NOSTRIL DAILY AS NEEDED FOR RHINITIS 10/10/22 2/9/23  Karen Tinajero MD   lisinopril-hydroCHLOROthiazide (PRINZIDE, ZESTORETIC) 20-25 mg per tablet Take 1 Tablet by mouth daily. 9/21/22 2/9/23  Karen Tinajero MD   metoprolol tartrate (LOPRESSOR) 100 mg IR tablet Take 1 Tablet by mouth daily. 9/21/22 2/9/23  Karen Tinajero MD   metoprolol tartrate (LOPRESSOR) 100 mg IR tablet Take 1 Tab by mouth daily for 180 days.  4/22/21 2/9/23  Karen Tinajero MD Patient Active Problem List    Diagnosis Date Noted    Prediabetes 10/29/2018    Morbid obesity with BMI of 45.0-49.9, adult ([facility] [address] 10/29/2018    Essential hypertension 03/01/2018       ROS    Objective:     Patient-Reported Vitals 12/27/2022   Patient-Reported Weight 272   Patient-Reported Pulse 78   Patient-Reported Temperature 98.3   Patient-Reported SpO2 98   Patient-Reported Systolic  947   Patient-Reported Diastolic 82   Patient-Reported LMP -      General: alert, cooperative, no distress   Mental  status: normal mood, behavior, speech, dress, motor activity, and thought processes, able to follow commands   HENT: NCAT   Neck: no visualized mass   Resp: no respiratory distress   Neuro: no gross deficits   Skin: no discoloration or lesions of concern on visible areas   Psychiatric: normal affect, consistent with stated mood, no evidence of hallucinations     Additional exam findings: We discussed the expected course, resolution and complications of the diagnosis(es) in detail. Medication risks, benefits, costs, interactions, and alternatives were discussed as indicated. I advised her to contact the office if her condition worsens, changes or fails to improve as anticipated. She expressed understanding with the diagnosis(es) and plan. Gwen Cruz, was evaluated through a synchronous (real-time) audio-video encounter. The patient (or guardian if applicable) is aware that this is a billable service, which includes applicable co-pays. This Virtual Visit was conducted with patient's (and/or legal guardian's) consent. The visit was conducted pursuant to the emergency declaration under the [address] [place] [address] authority and the [employer] General Act. Patient identification was verified, and a caregiver was present when appropriate.   The patient was located at: Home: [place] [id number]  The provider was located at:  Facility (Appt Department): [address] [place] [address],Fourth Floor [phone]        Francine Silveira MD

## 2023-03-10 RX ORDER — IBUPROFEN 800 MG/1
TABLET ORAL
Qty: 30 TABLET | Refills: 2 | OUTPATIENT
Start: 2023-03-10

## 2023-04-06 ENCOUNTER — TELEPHONE (OUTPATIENT)
Age: 53
End: 2023-04-06

## 2023-04-06 NOTE — TELEPHONE ENCOUNTER
This patient contacted office for the following prescriptions to be filled:    Medication requested : ibuprofen (MOTRIN) 800 mg tablet     PCP: [address] or Print: [facility]   Mail order or [facility]     Scheduled appointment if not seen by current providers in office: LOV 2/9/2023 FU 7/18/2023 pt usually gets from [address] [place] [address]

## 2023-04-06 NOTE — TELEPHONE ENCOUNTER
Patient identified with 2 identifiers (name and ). spoke with patient and she was advised that after reviewing her chart she was advised that she would need to get the prescription from Dr. Jameson Galvan who originally prescribed.  Patient verbalizes understanding

## 2023-04-17 ENCOUNTER — OFFICE VISIT (OUTPATIENT)
Age: 53
End: 2023-04-17
Payer: MEDICAID

## 2023-04-17 VITALS — TEMPERATURE: 97.1 F | HEIGHT: 62 IN | BODY MASS INDEX: 49.69 KG/M2 | WEIGHT: 270 LBS

## 2023-04-17 DIAGNOSIS — M65.312 TRIGGER THUMB OF LEFT HAND: ICD-10-CM

## 2023-04-17 DIAGNOSIS — M17.11 UNILATERAL PRIMARY OSTEOARTHRITIS, RIGHT KNEE: Primary | ICD-10-CM

## 2023-04-17 DIAGNOSIS — M25.569 PAIN OF KNEE AFTER INJURY: ICD-10-CM

## 2023-04-17 PROCEDURE — 99213 OFFICE O/P EST LOW 20 MIN: CPT | Performed by: SPECIALIST

## 2023-04-17 RX ORDER — IBUPROFEN 200 MG
200 TABLET ORAL EVERY 6 HOURS PRN
Qty: 120 TABLET | Refills: 3 | Status: SHIPPED | OUTPATIENT
Start: 2023-04-17
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
## 2023-05-01 ENCOUNTER — CLINICAL DOCUMENTATION (OUTPATIENT)
Age: 53
End: 2023-05-01

## 2023-05-01 ENCOUNTER — OFFICE VISIT (OUTPATIENT)
Age: 53
End: 2023-05-01
Payer: MEDICAID

## 2023-05-01 VITALS
WEIGHT: 278.6 LBS | OXYGEN SATURATION: 97 % | TEMPERATURE: 96.9 F | HEART RATE: 65 BPM | HEIGHT: 62 IN | BODY MASS INDEX: 51.27 KG/M2

## 2023-05-01 DIAGNOSIS — M17.12 UNILATERAL PRIMARY OSTEOARTHRITIS, LEFT KNEE: ICD-10-CM

## 2023-05-01 DIAGNOSIS — M17.11 UNILATERAL PRIMARY OSTEOARTHRITIS, RIGHT KNEE: Primary | ICD-10-CM

## 2023-05-01 PROCEDURE — 20610 DRAIN/INJ JOINT/BURSA W/O US: CPT | Performed by: SPECIALIST

## 2023-05-01 PROCEDURE — 99213 OFFICE O/P EST LOW 20 MIN: CPT | Performed by: SPECIALIST

## 2023-05-01 RX ORDER — BETAMETHASONE SODIUM PHOSPHATE AND BETAMETHASONE ACETATE 3; 3 MG/ML; MG/ML
3 INJECTION, SUSPENSION INTRA-ARTICULAR; INTRALESIONAL; INTRAMUSCULAR; SOFT TISSUE ONCE
Status: COMPLETED | OUTPATIENT
Start: 2023-05-01 | End: 2023-05-01

## 2023-05-01 RX ADMIN — BETAMETHASONE SODIUM PHOSPHATE AND BETAMETHASONE ACETATE 3 MG: 3; 3 INJECTION, SUSPENSION INTRA-ARTICULAR; INTRALESIONAL; INTRAMUSCULAR; SOFT TISSUE at 10:01
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
## 2023-05-02 ENCOUNTER — CLINICAL DOCUMENTATION (OUTPATIENT)
Age: 53
End: 2023-05-02

## 2023-05-02 ENCOUNTER — TELEPHONE (OUTPATIENT)
Age: 53
End: 2023-05-02

## 2023-05-02 NOTE — TELEPHONE ENCOUNTER
Patient called stating her employer is requesting different wording for her work letter.     Patient out of work from 5/5/2023 thru 5/15/2023 with return date of 5/16/2023    Please advise when complete: [phone]

## 2023-05-03 NOTE — TELEPHONE ENCOUNTER
OK per Dr Naida Carreon for new letter as pt requested--letter written--left message for pt --ready at [facility] location

## 2023-05-11 ENCOUNTER — TELEPHONE (OUTPATIENT)
Age: 53
End: 2023-05-11

## 2023-05-11 NOTE — TELEPHONE ENCOUNTER
Patient is requesting to speak to her doctor about getting an additional 2 weeks off of work. She's aware he's not in the office today and that we will let her know as soon as possible. Patient can be reached at [phone].

## 2023-05-12 NOTE — TELEPHONE ENCOUNTER
Spoke with pt--she wants note out of work until 6-2-23--OK Dr Yaakov Foster written--pt informed ready at [facility]

## 2023-05-16 NOTE — PROGRESS NOTES
1. \"Have you been to the ER, urgent care clinic since your last visit? Hospitalized since your last visit? \" No    2. \"Have you seen or consulted any other health care providers outside of the [address] [place] [address] since your last visit? \" No     3. For patients aged 39-70: Has the patient had a colonoscopy / FIT/ Cologuard? No      If the patient is female:    4. For patients aged 41-77: Has the patient had a mammogram within the past 2 years? No      5. For patients aged 21-65: Has the patient had a pap smear? Yes - Care Gap present. Most recent result on file 10/28/16. 2022 [facility]    Chief Complaint   Patient presents with    Urinary Frequency     X 1 week    Other     Pressure in the lower abdomen x 1 week    Orders     Patient is requested updated lab order - did not complete 11/16/22 blood work from Dr. Deisi Roberts.

## 2023-05-17 ENCOUNTER — OFFICE VISIT (OUTPATIENT)
Age: 53
End: 2023-05-17
Payer: MEDICAID

## 2023-05-17 ENCOUNTER — HOSPITAL ENCOUNTER (OUTPATIENT)
Facility: HOSPITAL | Age: 53
Setting detail: SPECIMEN
Discharge: HOME | End: 2023-05-20

## 2023-05-17 VITALS
TEMPERATURE: 96.7 F | WEIGHT: 272 LBS | DIASTOLIC BLOOD PRESSURE: 70 MMHG | SYSTOLIC BLOOD PRESSURE: 124 MMHG | BODY MASS INDEX: 50.05 KG/M2 | RESPIRATION RATE: 16 BRPM | HEIGHT: 62 IN

## 2023-05-17 DIAGNOSIS — R35.0 URINARY FREQUENCY: Primary | ICD-10-CM

## 2023-05-17 DIAGNOSIS — N89.8 VAGINAL ITCHING: ICD-10-CM

## 2023-05-17 DIAGNOSIS — R73.03 PREDIABETES: ICD-10-CM

## 2023-05-17 DIAGNOSIS — L75.0 URINARY BODY ODOR: ICD-10-CM

## 2023-05-17 DIAGNOSIS — I10 ESSENTIAL HYPERTENSION: ICD-10-CM

## 2023-05-17 LAB
BILIRUBIN, URINE, POC: NEGATIVE
BLOOD URINE, POC: NEGATIVE
GLUCOSE URINE, POC: NEGATIVE
KETONES, URINE, POC: NEGATIVE
LEUKOCYTE ESTERASE, URINE, POC: NEGATIVE
NITRITE, URINE, POC: NEGATIVE
PH, URINE, POC: 5.5 (ref 4.6–8)
PROTEIN,URINE, POC: NEGATIVE
SENTARA SPECIMEN COLLECTION: NORMAL
SPECIFIC GRAVITY, URINE, POC: 1.02 (ref 1–1.03)
URINALYSIS CLARITY, POC: CLEAR
URINALYSIS COLOR, POC: YELLOW
UROBILINOGEN, POC: NORMAL

## 2023-05-17 PROCEDURE — 3078F DIAST BP <80 MM HG: CPT | Performed by: FAMILY MEDICINE

## 2023-05-17 PROCEDURE — 3074F SYST BP LT 130 MM HG: CPT | Performed by: FAMILY MEDICINE

## 2023-05-17 PROCEDURE — 99214 OFFICE O/P EST MOD 30 MIN: CPT | Performed by: FAMILY MEDICINE

## 2023-05-17 PROCEDURE — 81003 URINALYSIS AUTO W/O SCOPE: CPT | Performed by: FAMILY MEDICINE

## 2023-05-17 PROCEDURE — PBSHW AMB POC URINALYSIS DIP STICK AUTO W/O MICRO: Performed by: FAMILY MEDICINE

## 2023-05-17 RX ORDER — SULFAMETHOXAZOLE AND TRIMETHOPRIM 800; 160 MG/1; MG/1
1 TABLET ORAL 2 TIMES DAILY
Qty: 10 TABLET | Refills: 0 | Status: SHIPPED | OUTPATIENT
Start: 2023-05-17 | End: 2023-05-20

## 2023-05-17 RX ORDER — FLUCONAZOLE 150 MG/1
150 TABLET ORAL ONCE
Qty: 1 TABLET | Refills: 0 | Status: SHIPPED | OUTPATIENT
Start: 2023-05-17 | End: 2023-05-17

## 2023-05-17 RX ORDER — IBUPROFEN 800 MG/1
TABLET ORAL
COMMUNITY
Start: 2023-02-11

## 2023-05-17 RX ORDER — METRONIDAZOLE 7.5 MG/G
1 GEL VAGINAL DAILY
Qty: 1 EACH | Refills: 0 | Status: SHIPPED | OUTPATIENT
Start: 2023-05-17 | End: 2023-05-22

## 2023-05-17 RX ORDER — ELECTROLYTES/DEXTROSE
1 SOLUTION, ORAL ORAL DAILY
COMMUNITY

## 2023-05-17 SDOH — ECONOMIC STABILITY: FOOD INSECURITY: WITHIN THE PAST 12 MONTHS, YOU WORRIED THAT YOUR FOOD WOULD RUN OUT BEFORE YOU GOT MONEY TO BUY MORE.: NEVER TRUE

## 2023-05-17 SDOH — ECONOMIC STABILITY: FOOD INSECURITY: WITHIN THE PAST 12 MONTHS, THE FOOD YOU BOUGHT JUST DIDN'T LAST AND YOU DIDN'T HAVE MONEY TO GET MORE.: SOMETIMES TRUE

## 2023-05-17 SDOH — ECONOMIC STABILITY: HOUSING INSECURITY
IN THE LAST 12 MONTHS, WAS THERE A TIME WHEN YOU DID NOT HAVE A STEADY PLACE TO SLEEP OR SLEPT IN A SHELTER (INCLUDING NOW)?: NO

## 2023-05-17 SDOH — ECONOMIC STABILITY: INCOME INSECURITY: HOW HARD IS IT FOR YOU TO PAY FOR THE VERY BASICS LIKE FOOD, HOUSING, MEDICAL CARE, AND HEATING?: VERY HARD

## 2023-05-17 ASSESSMENT — PATIENT HEALTH QUESTIONNAIRE - PHQ9
1. LITTLE INTEREST OR PLEASURE IN DOING THINGS: 0
SUM OF ALL RESPONSES TO PHQ QUESTIONS 1-9: 0
2. FEELING DOWN, DEPRESSED OR HOPELESS: 0
SUM OF ALL RESPONSES TO PHQ9 QUESTIONS 1 & 2: 0

## 2023-05-17 NOTE — PROGRESS NOTES
Oral Veliz, 47 y.o.,  female    SUBJECTIVE  Urinary frequency x 1 week  C/o urinary frequency, urgency past week no dysuria, hematuria, flank pain, or hx of recurrent UTIs. She reports some urinary fishy odor and itching but no discharge or pelvic pain. She has known prediabetes a1c 6.2 and recently received steroid joint injection. No polyuria, polydipsia, polyphagia. ROS:  See HPI, all others negative        Patient Active Problem List   Diagnosis    Prediabetes    Morbid obesity with BMI of 45.0-49.9, adult ([facility] [address]    Essential hypertension       Current Outpatient Medications   Medication Sig Dispense Refill    ibuprofen (ADVIL;MOTRIN) 800 MG tablet TAKE 1 TABLET BY MOUTH EVERY EIGHT (8) HOURS AS NEEDED FOR PAIN. WITH FOOD      Multiple Vitamin (MULTIVITAMIN ADULT) TABS Take 1 tablet by mouth daily      fluconazole (DIFLUCAN) 150 MG tablet Take 1 tablet by mouth once for 1 dose 1 tablet 0    metroNIDAZOLE (METROGEL) 0.75 % vaginal gel Place 1 Applicatorful vaginally daily for 5 days 1 each 0    sulfamethoxazole-trimethoprim (BACTRIM DS;SEPTRA DS) 800-160 MG per tablet Take 1 tablet by mouth 2 times daily for 3 days 10 tablet 0    ibuprofen (ADVIL) 200 MG tablet Take 1 tablet by mouth every 6 hours as needed for Pain 120 tablet 3    albuterol sulfate HFA (PROVENTIL;VENTOLIN;PROAIR) 108 (90 Base) MCG/ACT inhaler INHALE 2 PUFFS BY MOUTH EVERY 6 HOURS AS NEEDED FOR WHEEZING      ALPRAZolam (XANAX) 0.5 MG tablet Take 1 tablet by mouth daily as needed.       Cholecalciferol 50 MCG (2000 UT) CAPS TAKE 1 CAPSULE BY MOUTH DAILY      clotrimazole-betamethasone (LOTRISONE) 1-0.05 % cream APPLY TWICE DAILY OVER AFFECTED AREA      cyclobenzaprine (FLEXERIL) 10 MG tablet Take 1 tablet by mouth 3 times daily as needed      fluticasone (FLONASE) 50 MCG/ACT nasal spray SHAKE LIQUID AND USE 1 SPRAY IN EACH NOSTRIL DAILY AS NEEDED FOR RHINITIS      lisinopril-hydroCHLOROthiazide (PRINZIDE;ZESTORETIC) 20-25 MG per tablet

## 2023-05-19 LAB — RESULT: NORMAL

## 2023-06-21 ENCOUNTER — TELEPHONE (OUTPATIENT)
Age: 53
End: 2023-06-21

## 2023-06-21 DIAGNOSIS — M17.11 UNILATERAL PRIMARY OSTEOARTHRITIS, RIGHT KNEE: Primary | ICD-10-CM

## 2023-06-21 DIAGNOSIS — M17.12 UNILATERAL PRIMARY OSTEOARTHRITIS, LEFT KNEE: ICD-10-CM

## 2023-06-21 DIAGNOSIS — M54.50 CHRONIC BILATERAL LOW BACK PAIN, UNSPECIFIED WHETHER SCIATICA PRESENT: ICD-10-CM

## 2023-06-21 DIAGNOSIS — G89.29 CHRONIC BILATERAL LOW BACK PAIN, UNSPECIFIED WHETHER SCIATICA PRESENT: ICD-10-CM

## 2023-06-21 NOTE — TELEPHONE ENCOUNTER
Spoke with pt--Motrin 200mg is over the counter--will address other meds with Dr Milton Yanez when he returns to office tomorrow

## 2023-06-21 NOTE — TELEPHONE ENCOUNTER
Patient called and said she never received the Motrin 200 mg medication or the Cream for the Arthritis pain from . [facility] [address]. [phone]      Patient tel. [phone]. Note :  patient last seen on 5/1/23 for Both Knees by .

## 2023-08-10 ENCOUNTER — TELEPHONE (OUTPATIENT)
Age: 53
End: 2023-08-10

## 2023-08-10 NOTE — TELEPHONE ENCOUNTER
Patient states she has questions about the Euflexxa injections, and asking to speak to a nurse before her first injection scheduled for 8/25/23. Patient can be reached at [phone].

## 2023-08-10 NOTE — TELEPHONE ENCOUNTER
Spoke with pt--she was concerned with moving around after injections--informed her normally pts can do normal activity--she understood

## 2023-09-07 ENCOUNTER — OFFICE VISIT (OUTPATIENT)
Age: 53
End: 2023-09-07
Payer: MEDICAID

## 2023-09-07 VITALS
DIASTOLIC BLOOD PRESSURE: 76 MMHG | TEMPERATURE: 96.9 F | HEIGHT: 62 IN | OXYGEN SATURATION: 97 % | SYSTOLIC BLOOD PRESSURE: 120 MMHG | HEART RATE: 65 BPM | WEIGHT: 279.2 LBS | BODY MASS INDEX: 51.38 KG/M2 | RESPIRATION RATE: 16 BRPM

## 2023-09-07 DIAGNOSIS — I10 ESSENTIAL HYPERTENSION: Primary | ICD-10-CM

## 2023-09-07 DIAGNOSIS — Z12.31 ENCOUNTER FOR SCREENING MAMMOGRAM FOR MALIGNANT NEOPLASM OF BREAST: ICD-10-CM

## 2023-09-07 DIAGNOSIS — I10 ESSENTIAL HYPERTENSION: ICD-10-CM

## 2023-09-07 DIAGNOSIS — M17.11 UNILATERAL PRIMARY OSTEOARTHRITIS, RIGHT KNEE: ICD-10-CM

## 2023-09-07 DIAGNOSIS — E04.9 ENLARGED THYROID GLAND: ICD-10-CM

## 2023-09-07 DIAGNOSIS — Z11.4 SCREENING FOR HIV (HUMAN IMMUNODEFICIENCY VIRUS): ICD-10-CM

## 2023-09-07 DIAGNOSIS — Z13.220 SCREENING FOR HYPERLIPIDEMIA: ICD-10-CM

## 2023-09-07 DIAGNOSIS — R73.01 IMPAIRED FASTING BLOOD SUGAR: ICD-10-CM

## 2023-09-07 PROCEDURE — 3078F DIAST BP <80 MM HG: CPT | Performed by: FAMILY MEDICINE

## 2023-09-07 PROCEDURE — 99214 OFFICE O/P EST MOD 30 MIN: CPT | Performed by: FAMILY MEDICINE

## 2023-09-07 PROCEDURE — 3074F SYST BP LT 130 MM HG: CPT | Performed by: FAMILY MEDICINE

## 2023-09-07 RX ORDER — METOPROLOL TARTRATE 100 MG/1
100 TABLET ORAL DAILY
Qty: 180 TABLET | Refills: 1 | Status: SHIPPED | OUTPATIENT
Start: 2023-09-07

## 2023-09-07 RX ORDER — LISINOPRIL AND HYDROCHLOROTHIAZIDE 25; 20 MG/1; MG/1
1 TABLET ORAL DAILY
Qty: 90 TABLET | Refills: 1 | Status: SHIPPED | OUTPATIENT
Start: 2023-09-07

## 2023-09-07 RX ORDER — IBUPROFEN 800 MG/1
800 TABLET ORAL EVERY 8 HOURS PRN
Qty: 90 TABLET | Refills: 0 | Status: SHIPPED | OUTPATIENT
Start: 2023-09-07

## 2023-09-07 ASSESSMENT — PATIENT HEALTH QUESTIONNAIRE - PHQ9
SUM OF ALL RESPONSES TO PHQ QUESTIONS 1-9: 0
SUM OF ALL RESPONSES TO PHQ9 QUESTIONS 1 & 2: 0
SUM OF ALL RESPONSES TO PHQ QUESTIONS 1-9: 0
2. FEELING DOWN, DEPRESSED OR HOPELESS: 0
1. LITTLE INTEREST OR PLEASURE IN DOING THINGS: 0

## 2023-09-28 ENCOUNTER — TELEPHONE (OUTPATIENT)
Age: 53
End: 2023-09-28

## 2023-09-28 NOTE — TELEPHONE ENCOUNTER
Patient returned call to [facility] (two identifiers verified). She had MMR vaccination through [facility]. [facility] told her to get a letter from Dr. Danilo Harvey stating she can not have second dose of MMR due to allergic reaction. Also, patient was advised I will request office note from [facility] urgent care visit regarding allergic reaction. I tried to call the urgent care center earlier today to request note but no one answered the phone. Will try to call again. Patient verbalized understanding. She is aware I will let Dr. Danilo Harvey know that MMR vaccine was not through the pharmacy.

## 2023-09-28 NOTE — TELEPHONE ENCOUNTER
Attempted to contact patient at mobile number listed, but no answer. Need to find out from patient which pharmacy she went to for vaccination. Also, I called [facility] at [address] [place] [address] in [place] to request office note, but no one answered and unable to leave a message. Will try to call patient and urgent care center again later.

## 2023-09-28 NOTE — TELEPHONE ENCOUNTER
----- Message from Yared Pierre sent at 9/27/2023  9:09 AM EDT -----  Subject: Message to Provider    QUESTIONS  Information for Provider? Patient - said she had an allergic reaction to   the MMR Shot for her job and need a note/letter stating she cannot take   the second dose. Please call patient ASAP.  ---------------------------------------------------------------------------  --------------  Chris Barboza INFO  [phone]; OK to leave message on voicemail  ---------------------------------------------------------------------------  --------------  SCRIPT ANSWERS  Relationship to Patient?  Self

## 2023-09-28 NOTE — TELEPHONE ENCOUNTER
Called patient to get clarification on what type of allergic reaction she had -     Patient went to [facility] in [place] two weeks ago due to MMR allergic reaction - tingling of lips, sores in mouth, burning sensation of skin, rash on arms and chest, rapid heartbeat. French advised it could be delayed reaction to MMR vaccine. Please advise if you can write patient a note for employer advising she cannot have a second dose.

## 2023-10-16 ENCOUNTER — HOSPITAL ENCOUNTER (OUTPATIENT)
Facility: HOSPITAL | Age: 53
Discharge: HOME | End: 2023-10-19

## 2023-10-16 LAB — SENTARA SPECIMEN COLLECTION: NORMAL

## 2023-10-17 LAB
A/G RATIO: 1.1 RATIO (ref 1.1–2.6)
ALBUMIN SERPL-MCNC: 3.9 G/DL (ref 3.5–5)
ALP BLD-CCNC: 70 U/L (ref 25–115)
ALT SERPL-CCNC: 20 U/L (ref 5–40)
ANION GAP SERPL CALCULATED.3IONS-SCNC: 11 MMOL/L (ref 3–15)
AST SERPL-CCNC: 19 U/L (ref 10–37)
AVERAGE GLUCOSE: 128 MG/DL (ref 91–123)
BILIRUB SERPL-MCNC: 0.5 MG/DL (ref 0.2–1.2)
BUN BLDV-MCNC: 10 MG/DL (ref 6–22)
CALCIUM SERPL-MCNC: 9 MG/DL (ref 8.4–10.5)
CHLORIDE BLD-SCNC: 103 MMOL/L (ref 98–110)
CHOLESTEROL/HDL RATIO: 3 (ref 0–5)
CHOLESTEROL: 140 MG/DL (ref 110–200)
CO2: 27 MMOL/L (ref 20–32)
CREAT SERPL-MCNC: 0.7 MG/DL (ref 0.5–1.2)
GLOBULIN: 3.5 G/DL (ref 2–4)
GLOMERULAR FILTRATION RATE: >60 ML/MIN/1.73 SQ.M.
GLUCOSE: 95 MG/DL (ref 70–99)
HBA1C MFR BLD: 6.1 % (ref 4.8–5.6)
HDLC SERPL-MCNC: 47 MG/DL
HIV -1/0/2 AG/AB WITH REFLEX: NON REACTIVE
HIV INTERPRETATION: NORMAL
LDL CHOLESTEROL CALCULATED: 79 MG/DL (ref 50–99)
LDL/HDL RATIO: 1.7
NON-HDL CHOLESTEROL: 93 MG/DL
POTASSIUM SERPL-SCNC: 3.6 MMOL/L (ref 3.5–5.5)
SODIUM BLD-SCNC: 141 MMOL/L (ref 133–145)
T4 FREE: 1.2 NG/DL (ref 0.9–1.8)
TOTAL PROTEIN: 7.4 G/DL (ref 6.4–8.3)
TRIGL SERPL-MCNC: 67 MG/DL (ref 40–149)
TSH SERPL DL<=0.05 MIU/L-ACNC: 1.58 MCU/ML (ref 0.27–4.2)
VLDLC SERPL CALC-MCNC: 13 MG/DL (ref 8–30)

## 2023-10-24 ENCOUNTER — TELEPHONE (OUTPATIENT)
Age: 53
End: 2023-10-24

## 2023-10-24 NOTE — TELEPHONE ENCOUNTER
Patient called wanting to know if she could know how her labs were. Especially her diabetes. She does use Mychart so a [employer] message is ok.

## 2023-11-11 ENCOUNTER — HOSPITAL ENCOUNTER (EMERGENCY)
Facility: HOSPITAL | Age: 53
Discharge: LEFT BEFORE DISCHARGE | End: 2023-11-11
Attending: EMERGENCY MEDICINE

## 2023-11-11 VITALS
SYSTOLIC BLOOD PRESSURE: 148 MMHG | OXYGEN SATURATION: 98 % | HEART RATE: 83 BPM | HEIGHT: 61 IN | BODY MASS INDEX: 52.87 KG/M2 | TEMPERATURE: 98.3 F | RESPIRATION RATE: 18 BRPM | WEIGHT: 280 LBS | DIASTOLIC BLOOD PRESSURE: 87 MMHG

## 2023-11-11 ASSESSMENT — PAIN SCALES - GENERAL: PAINLEVEL_OUTOF10: 0

## 2023-11-11 NOTE — ED NOTES
Pt presents c/o cold symptoms x 1 week. Pt c/o productive cough that will not go away; pt was seen at [facility]; prescribed promethazine DM for cough and states that it is not helping. Pt tested for strep, COVID, and flu at [facility] on 11/8; all negative.       Дмитрий Woodruff RN  11/11/23 4922

## 2023-11-13 ENCOUNTER — HOSPITAL ENCOUNTER (EMERGENCY)
Facility: HOSPITAL | Age: 53
Discharge: HOME | End: 2023-11-13
Payer: MEDICAID

## 2023-11-13 ENCOUNTER — APPOINTMENT (OUTPATIENT)
Facility: HOSPITAL | Age: 53
End: 2023-11-13
Payer: MEDICAID

## 2023-11-13 VITALS
RESPIRATION RATE: 19 BRPM | WEIGHT: 280 LBS | DIASTOLIC BLOOD PRESSURE: 86 MMHG | OXYGEN SATURATION: 99 % | TEMPERATURE: 98.1 F | BODY MASS INDEX: 52.87 KG/M2 | HEIGHT: 61 IN | SYSTOLIC BLOOD PRESSURE: 150 MMHG | HEART RATE: 86 BPM

## 2023-11-13 DIAGNOSIS — J01.00 ACUTE NON-RECURRENT MAXILLARY SINUSITIS: Primary | ICD-10-CM

## 2023-11-13 DIAGNOSIS — R05.1 ACUTE COUGH: ICD-10-CM

## 2023-11-13 DIAGNOSIS — R09.89 CHEST CONGESTION: ICD-10-CM

## 2023-11-13 PROCEDURE — 71046 X-RAY EXAM CHEST 2 VIEWS: CPT

## 2023-11-13 PROCEDURE — 99283 EMERGENCY DEPT VISIT LOW MDM: CPT

## 2023-11-13 RX ORDER — AMOXICILLIN AND CLAVULANATE POTASSIUM 875; 125 MG/1; MG/1
1 TABLET, FILM COATED ORAL 2 TIMES DAILY
Qty: 14 TABLET | Refills: 0 | Status: SHIPPED | OUTPATIENT
Start: 2023-11-13 | End: 2023-11-20

## 2023-11-13 ASSESSMENT — ENCOUNTER SYMPTOMS
SINUS PRESSURE: 1
SINUS PAIN: 1
RHINORRHEA: 0
PHOTOPHOBIA: 0
COUGH: 1

## 2023-11-13 ASSESSMENT — PAIN - FUNCTIONAL ASSESSMENT: PAIN_FUNCTIONAL_ASSESSMENT: NONE - DENIES PAIN

## 2023-11-13 NOTE — ED NOTES
Discharge instructions given to patient. Follow up information provided. Verbalized understanding.      Fiedl Braga RN  11/13/23 6087

## 2023-11-13 NOTE — ED TRIAGE NOTES
Cough x 8 days, not improving, worse at night. Productive for thick yellow. Denies N/V/D or fever/chills.  Negative for Flu/Covid last Wednesday at [facility]

## 2023-11-13 NOTE — ED PROVIDER NOTES
known as: CHOLECALCIFEROL  Take 1 capsule by mouth daily               Where to Get Your Medications        These medications were sent to [address], [facility]  [address], [facility] [address]      Phone: [phone]   amoxicillin-clavulanate 875-125 MG per tablet          Dictation disclaimer:  Please note that this dictation was completed with [employer], the computer voice recognition software. Quite often unanticipated grammatical, syntax, homophones, and other interpretive errors are inadvertently transcribed by the computer software. Please disregard these errors. Please excuse any errors that have escaped final proofreading.          BEAU Green - JACQUELYN  11/13/23 8210

## 2023-11-13 NOTE — DISCHARGE INSTRUCTIONS
Take medication as prescribed. Continue congestion medication. Follow-up with your primary care physician within 2 days for reassessment. Bring the results from this visit with you for their review. Return to the ED immediately for any new, worsening, or persistent symptoms, including fever, vomiting, chest pain, shortness of breath, or any other medical concerns.

## 2023-11-27 ENCOUNTER — TELEPHONE (OUTPATIENT)
Facility: HOSPITAL | Age: 53
End: 2023-11-27

## 2023-11-27 NOTE — TELEPHONE ENCOUNTER
----- Message from Alyssa Blanco sent at 11/15/2023  9:40 AM EST -----  Good morning,    Can you call this patient back to discuss CXR results and need for outpatient CT for further assessment? Do you remember their visit from 2 days ago, is it a possible artifact?     Thanks,  Margi Pineda   ----- Message -----  From: Chapito Coker Incoming Orders Results To Lucinda  Sent: 11/13/2023   5:25 PM EST  To: [facility] Ed Physician/Midlevel Result Pool

## 2023-11-27 NOTE — TELEPHONE ENCOUNTER
----- Message from Alyssa Del Rosario sent at 11/15/2023  9:40 AM EST -----  Good morning,    Can you call this patient back to discuss CXR results and need for outpatient CT for further assessment? Do you remember their visit from 2 days ago, is it a possible artifact?     Thanks,  Simba Parra   ----- Message -----  From: Chapito Coker [facility]coming [facility]  Sent: 11/13/2023   5:25 PM EST  To: Hbv Ed Physician/Midlevel Result Pool

## 2023-11-27 NOTE — TELEPHONE ENCOUNTER
----- Message from Alyssa Cuellar sent at 11/15/2023  9:40 AM EST -----  Good morning,    Can you call this patient back to discuss CXR results and need for outpatient CT for further assessment? Do you remember their visit from 2 days ago, is it a possible artifact?     Thanks,  Sofi Zamora   ----- Message -----  From: Chapito Coker Incoming Orders Results To Rogers  Sent: 11/13/2023   5:25 PM EST  To: Hbv Ed Physician/Midlevel Result Pool

## 2023-11-29 DIAGNOSIS — R93.89 ABNORMAL CHEST X-RAY: Primary | ICD-10-CM

## 2023-11-29 DIAGNOSIS — F40.240 CLAUSTROPHOBIA: Primary | ICD-10-CM

## 2023-11-29 DIAGNOSIS — R91.1 LUNG NODULE: ICD-10-CM

## 2023-11-29 RX ORDER — ALPRAZOLAM 1 MG/1
TABLET ORAL
Qty: 2 TABLET | Refills: 0 | Status: SHIPPED | OUTPATIENT
Start: 2023-11-29 | End: 2023-11-29

## 2023-11-30 ENCOUNTER — TELEPHONE (OUTPATIENT)
Age: 53
End: 2023-11-30

## 2023-11-30 NOTE — TELEPHONE ENCOUNTER
----- Message from Martina Owens sent at 11/30/2023  1:58 PM EST -----  Subject: Message to Provider    QUESTIONS  Information for Provider? 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 Self

## 2023-12-04 NOTE — PROGRESS NOTES
1. \"Have you been to the ER, urgent care clinic since your last visit? Hospitalized since your last visit? \" Yes When: [facility] 11/13/23 sinusitis; cough and chest congestion. [facility] ED 11/11/23. 2. \"Have you seen or consulted any other health care providers outside of the [address] [place] [address] Avenue since your last visit? \" [facility] LOV: 11/20/23    3. For patients aged 43-73: Has the patient had a colonoscopy / FIT/ Cologuard? No      If the patient is female:    4. For patients aged 43-66: Has the patient had a mammogram within the past 2 years? No      5. For patients aged 21-65: Has the patient had a pap smear? Yes - [place] present.  Most recent result on file  11/20/23 Supriya Meyers

## 2023-12-05 ENCOUNTER — OFFICE VISIT (OUTPATIENT)
Age: 53
End: 2023-12-05
Payer: MEDICAID

## 2023-12-05 VITALS
HEIGHT: 62 IN | SYSTOLIC BLOOD PRESSURE: 120 MMHG | WEIGHT: 273.8 LBS | TEMPERATURE: 97.2 F | DIASTOLIC BLOOD PRESSURE: 86 MMHG | RESPIRATION RATE: 16 BRPM | BODY MASS INDEX: 50.38 KG/M2

## 2023-12-05 DIAGNOSIS — M17.11 UNILATERAL PRIMARY OSTEOARTHRITIS, RIGHT KNEE: ICD-10-CM

## 2023-12-05 DIAGNOSIS — R93.89 ABNORMAL CHEST X-RAY: ICD-10-CM

## 2023-12-05 DIAGNOSIS — K21.9 GASTROESOPHAGEAL REFLUX DISEASE WITHOUT ESOPHAGITIS: ICD-10-CM

## 2023-12-05 DIAGNOSIS — R09.82 POST-NASAL DRIP: ICD-10-CM

## 2023-12-05 DIAGNOSIS — G89.29 CHRONIC BILATERAL LOW BACK PAIN, UNSPECIFIED WHETHER SCIATICA PRESENT: ICD-10-CM

## 2023-12-05 DIAGNOSIS — M17.12 UNILATERAL PRIMARY OSTEOARTHRITIS, LEFT KNEE: ICD-10-CM

## 2023-12-05 DIAGNOSIS — F40.9 PHOBIA, UNSPECIFIED TYPE: Primary | ICD-10-CM

## 2023-12-05 DIAGNOSIS — M54.50 CHRONIC BILATERAL LOW BACK PAIN, UNSPECIFIED WHETHER SCIATICA PRESENT: ICD-10-CM

## 2023-12-05 PROCEDURE — 99213 OFFICE O/P EST LOW 20 MIN: CPT | Performed by: FAMILY MEDICINE

## 2023-12-05 PROCEDURE — 3074F SYST BP LT 130 MM HG: CPT | Performed by: FAMILY MEDICINE

## 2023-12-05 PROCEDURE — 3079F DIAST BP 80-89 MM HG: CPT | Performed by: FAMILY MEDICINE

## 2023-12-05 RX ORDER — ALBUTEROL SULFATE 90 UG/1
2 AEROSOL, METERED RESPIRATORY (INHALATION) EVERY 6 HOURS PRN
Qty: 18 G | Refills: 0 | Status: SHIPPED | OUTPATIENT
Start: 2023-12-05

## 2023-12-05 RX ORDER — CLOTRIMAZOLE AND BETAMETHASONE DIPROPIONATE 10; .64 MG/G; MG/G
CREAM TOPICAL 2 TIMES DAILY
Qty: 45 G | Refills: 0 | Status: SHIPPED | OUTPATIENT
Start: 2023-12-05

## 2023-12-05 RX ORDER — FLUTICASONE PROPIONATE 50 MCG
1 SPRAY, SUSPENSION (ML) NASAL DAILY
Qty: 16 G | Refills: 0 | Status: SHIPPED | OUTPATIENT
Start: 2023-12-05

## 2023-12-05 RX ORDER — LISINOPRIL AND HYDROCHLOROTHIAZIDE 25; 20 MG/1; MG/1
1 TABLET ORAL DAILY
Qty: 90 TABLET | Refills: 1 | Status: SHIPPED | OUTPATIENT
Start: 2023-12-05

## 2023-12-05 RX ORDER — METOPROLOL TARTRATE 100 MG/1
100 TABLET ORAL DAILY
Qty: 180 TABLET | Refills: 1 | Status: SHIPPED | OUTPATIENT
Start: 2023-12-05

## 2023-12-05 RX ORDER — IBUPROFEN 800 MG/1
800 TABLET ORAL EVERY 8 HOURS PRN
Qty: 90 TABLET | Refills: 0 | Status: SHIPPED | OUTPATIENT
Start: 2023-12-05

## 2023-12-05 RX ORDER — CETIRIZINE HYDROCHLORIDE 10 MG/1
10 TABLET ORAL DAILY
Qty: 30 TABLET | Refills: 1 | Status: SHIPPED | OUTPATIENT
Start: 2023-12-05

## 2023-12-05 ASSESSMENT — PATIENT HEALTH QUESTIONNAIRE - PHQ9
SUM OF ALL RESPONSES TO PHQ QUESTIONS 1-9: 0
2. FEELING DOWN, DEPRESSED OR HOPELESS: 0
SUM OF ALL RESPONSES TO PHQ9 QUESTIONS 1 & 2: 0
1. LITTLE INTEREST OR PLEASURE IN DOING THINGS: 0

## 2023-12-05 NOTE — PROGRESS NOTES
Jay Javier is a 46 y.o. female complains of   Chief Complaint   Patient presents with    Annual Exam    Results    drainage down back of throat/cough     Did not finish augmentin that was given to her from ED on 11/13/13    Orders     Wants to know if she can redo chest xray - states she did not remove her bra and wonders if that interfered with test       HPI: Here for physical but was changed to routine visit. Had an Er visit done last month. Abnormal chest x-ray. Still pending ct scan to be done which is scheduled on December 15. Discussed that found a lump could be from rib or lung. Need further study to evaluate. No more cough or sob. No chest pain. No palpitation. Sitting without any acute distress. Some post nasal drip. Not taking any medication for it. Also needed refill for arthritis medication. Chronic lower back pain and bilateral knee pain. No swollen joint. Discuss to take NSAID with the food. On and off GERD symptoms. Asymptomatic during visit. Vitals stable.    Lab Results   Component Value Date    WBC 7.9 01/19/2022    HGB 13.7 01/19/2022    HCT 42.1 01/19/2022    MCV 89 01/19/2022     01/19/2022     Lab Results   Component Value Date     10/16/2023    K 3.6 10/16/2023     10/16/2023    CO2 27 10/16/2023    BUN 10 10/16/2023    CREATININE 0.7 10/16/2023    GLUCOSE 95 10/16/2023    CALCIUM 9.0 10/16/2023    PROT 7.4 10/16/2023    LABALBU 3.9 10/16/2023    BILITOT 0.5 10/16/2023    ALKPHOS 70 10/16/2023    AST 19 10/16/2023    ALT 20 10/16/2023    LABGLOM >60.0 01/19/2022    GFRAA >60.0 01/19/2022    AGRATIO 1.1 10/16/2023    GLOB 3.5 10/16/2023       Lab Results   Component Value Date    CHOL 140 10/16/2023    CHOL 141 01/19/2022    CHOL 137 05/21/2020     Lab Results   Component Value Date    TRIG 67 10/16/2023    TRIG 60 01/19/2022    TRIG 68 05/21/2020     Lab Results   Component Value Date    HDL 47 10/16/2023    HDL 49 01/19/2022    HDL 2.9 01/19/2022

## 2023-12-12 ENCOUNTER — HOSPITAL ENCOUNTER (OUTPATIENT)
Facility: HOSPITAL | Age: 53
Discharge: HOME | End: 2023-12-15
Attending: FAMILY MEDICINE
Payer: MEDICAID

## 2023-12-12 DIAGNOSIS — E04.9 ENLARGED THYROID GLAND: ICD-10-CM

## 2023-12-12 PROCEDURE — 76536 US EXAM OF HEAD AND NECK: CPT

## 2023-12-13 ENCOUNTER — HOSPITAL ENCOUNTER (OUTPATIENT)
Facility: HOSPITAL | Age: 53
Discharge: HOME | End: 2023-12-16
Attending: FAMILY MEDICINE
Payer: MEDICAID

## 2023-12-13 DIAGNOSIS — R91.1 LUNG NODULE: ICD-10-CM

## 2023-12-13 DIAGNOSIS — R93.89 ABNORMAL CHEST X-RAY: ICD-10-CM

## 2023-12-13 PROCEDURE — 6360000004 HC RX CONTRAST MEDICATION: Performed by: FAMILY MEDICINE

## 2023-12-13 PROCEDURE — 71260 CT THORAX DX C+: CPT

## 2023-12-13 RX ADMIN — IOPAMIDOL 100 ML: 612 INJECTION, SOLUTION INTRAVENOUS at 12:43

## 2024-02-21 RX ORDER — FLUTICASONE PROPIONATE 50 MCG
SPRAY, SUSPENSION (ML) NASAL
Qty: 16 G | Refills: 0 | Status: SHIPPED | OUTPATIENT
Start: 2024-02-21

## 2024-03-14 ENCOUNTER — TELEMEDICINE (OUTPATIENT)
Age: 54
End: 2024-03-14
Payer: MEDICAID

## 2024-03-14 DIAGNOSIS — H10.31 ACUTE BACTERIAL CONJUNCTIVITIS OF RIGHT EYE: Primary | ICD-10-CM

## 2024-03-14 PROCEDURE — 99213 OFFICE O/P EST LOW 20 MIN: CPT | Performed by: FAMILY MEDICINE

## 2024-03-14 RX ORDER — POLYMYXIN B SULFATE AND TRIMETHOPRIM 1; 10000 MG/ML; [USP'U]/ML
1 SOLUTION OPHTHALMIC EVERY 4 HOURS
Qty: 3 ML | Refills: 0 | Status: SHIPPED | OUTPATIENT
Start: 2024-03-14 | End: 2024-03-24

## 2024-03-14 ASSESSMENT — PATIENT HEALTH QUESTIONNAIRE - PHQ9
SUM OF ALL RESPONSES TO PHQ9 QUESTIONS 1 & 2: 0
SUM OF ALL RESPONSES TO PHQ QUESTIONS 1-9: 0
2. FEELING DOWN, DEPRESSED OR HOPELESS: 0
1. LITTLE INTEREST OR PLEASURE IN DOING THINGS: 0

## 2024-03-14 ASSESSMENT — ENCOUNTER SYMPTOMS
EYE ITCHING: 1
EYE REDNESS: 1
EYE PAIN: 1
COUGH: 0
EYE DISCHARGE: 1

## 2024-03-14 NOTE — PROGRESS NOTES
Rekha Soto is a 53 y.o. female    Chief Complaint   Patient presents with    Eye Problem     Exposed to conjunctivitis. C/o right eye pain, redness, drainage and sensitivity to light x1week.          3/13/2024     5:15 PM   Patient-Reported Vitals   Patient-Reported Weight 280   Patient-Reported Height 5,5   Patient-Reported Systolic 120 mmHg   Patient-Reported Diastolic 80 mmHg   Patient-Reported Pulse 70   Patient-Reported Temperature 98   Patient-Reported SpO2 100   Patient-Reported Peak Flow N/a       \"Have you been to the ER, urgent care clinic since your last visit?  Hospitalized since your last visit?\"    NO    “Have you seen or consulted any other health care providers outside of [facility] since your last visit?”    NO    Have you had a mammogram?”   NO, order placed by Dr. Chirinos on 09/07/2023.          “Have you had a pap smear?”    NO    Date of last Cervical Cancer screen (HPV or PAP): 10/28/2016         “Have you had a colorectal cancer screening such as a colonoscopy/FIT/Cologuard?    NO    No colonoscopy on file  No cologuard on file  No FIT/FOBT on file   No flexible sigmoidoscopy on file         Click Here for Release of Records Request    
Vitamins-Minerals (EMERGEN-C IMMUNE PO) Take by mouth Yes Kyler Montano MD   Multiple Vitamin (MULTIVITAMIN ADULT) TABS Take 1 tablet by mouth daily Yes Kyler Montano MD   cyclobenzaprine (FLEXERIL) 10 MG tablet Take 1 tablet by mouth 3 times daily as needed Yes Automatic Reconciliation, Ar   ibuprofen (ADVIL;MOTRIN) 800 MG tablet Take 1 tablet by mouth every 8 hours as needed for Pain  Patient not taking: Reported on 3/14/2024  Danii Chirinos MD   benzonatate (TESSALON) 200 MG capsule TAKE (ORAL) 3 TIMES PER DAY (AS NEEDED FOR - COUGH)  Patient not taking: Reported on 3/14/2024  Kyler Montano MD   HYDROcodone-acetaminophen (NORCO) 5-325 MG per tablet Take 1 tablet by mouth every 4 hours as needed.  Patient not taking: Reported on 3/14/2024  Kyler Montano MD   ondansetron (ZOFRAN) 4 MG tablet TAKE BY MOUTH AS DIRECTED FOR COLONOSCOPY PREP  Patient not taking: Reported on 12/5/2023  Kyler Montano MD   promethazine-dextromethorphan (PROMETHAZINE-DM) 6.25-15 MG/5ML syrup TAKE 5 ML BY MOUTH EVERY 4 TO 6 HOURS FOR 5 DAYS AS NEEDED FOR COUGH  Patient not taking: Reported on 12/5/2023  Kyler Montano MD   brompheniramine-pseudoephedrine-DM 2-30-10 MG/5ML syrup TAKE 10 ML BY MOUTH EVERY 6 TO 8 HOURS FOR 7 DAYS FOR COLD SYMPTOMS  Patient not taking: Reported on 3/14/2024  Kyler Montano MD       Social History     Tobacco Use    Smoking status: Never    Smokeless tobacco: Never   Vaping Use    Vaping Use: Never used   Substance Use Topics    Alcohol use: Yes     Comment: socially    Drug use: Never        No Known Allergies,   Past Medical History:   Diagnosis Date    Acid reflux     Acute cystitis with hematuria     GERD (gastroesophageal reflux disease)     Heel spur     HTN (hypertension), benign 12/2/2009    Menorrhagia     Plantar fasciitis     Shoulder bursitis     Urticaria     Vulvovaginitis    ,   Past Surgical History:   Procedure Laterality Date

## 2024-03-21 ENCOUNTER — TELEPHONE (OUTPATIENT)
Age: 54
End: 2024-03-21

## 2024-03-21 NOTE — TELEPHONE ENCOUNTER
Called patient Rekha Soto [phone]. No answer. Left detailed message that Dr. Chirinos recommends she try calamine lotion for now. If no improvement she may want to be seen at urgent care or she can schedule an appt with us .   Asked patient to return my call at [phone] option 3.

## 2024-03-21 NOTE — TELEPHONE ENCOUNTER
Would like to see if Dr. Chirinos can prescribe some cream. She has been exposed to scabies. She is itchy behind knees, arms and thighs. Started Tuesday.     [id number] - [place], [place] - [address] - P [phone] - F [phone] [address] [address]  Phone: [phone]  Fax: [phone]

## 2024-03-22 ENCOUNTER — OFFICE VISIT (OUTPATIENT)
Age: 54
End: 2024-03-22
Payer: MEDICAID

## 2024-03-22 VITALS — HEIGHT: 62 IN | TEMPERATURE: 97.3 F | WEIGHT: 272 LBS | BODY MASS INDEX: 50.05 KG/M2

## 2024-03-22 DIAGNOSIS — G89.29 CHRONIC PAIN OF LEFT KNEE: ICD-10-CM

## 2024-03-22 DIAGNOSIS — G89.29 CHRONIC PAIN OF RIGHT KNEE: ICD-10-CM

## 2024-03-22 DIAGNOSIS — R20.0 NUMBNESS AND TINGLING IN RIGHT HAND: Primary | ICD-10-CM

## 2024-03-22 DIAGNOSIS — R20.2 NUMBNESS AND TINGLING IN RIGHT HAND: Primary | ICD-10-CM

## 2024-03-22 DIAGNOSIS — M17.12 UNILATERAL PRIMARY OSTEOARTHRITIS, LEFT KNEE: ICD-10-CM

## 2024-03-22 DIAGNOSIS — M25.561 CHRONIC PAIN OF RIGHT KNEE: ICD-10-CM

## 2024-03-22 DIAGNOSIS — M17.11 UNILATERAL PRIMARY OSTEOARTHRITIS, RIGHT KNEE: ICD-10-CM

## 2024-03-22 DIAGNOSIS — M25.562 CHRONIC PAIN OF LEFT KNEE: ICD-10-CM

## 2024-03-22 PROCEDURE — 99213 OFFICE O/P EST LOW 20 MIN: CPT | Performed by: SPECIALIST

## 2024-03-24 ENCOUNTER — HOSPITAL ENCOUNTER (EMERGENCY)
Facility: HOSPITAL | Age: 54
Discharge: HOME | End: 2024-03-24
Attending: EMERGENCY MEDICINE
Payer: MEDICAID

## 2024-03-24 VITALS
BODY MASS INDEX: 50.05 KG/M2 | OXYGEN SATURATION: 99 % | HEART RATE: 72 BPM | DIASTOLIC BLOOD PRESSURE: 88 MMHG | HEIGHT: 62 IN | SYSTOLIC BLOOD PRESSURE: 148 MMHG | RESPIRATION RATE: 18 BRPM | WEIGHT: 272 LBS | TEMPERATURE: 97.6 F

## 2024-03-24 DIAGNOSIS — R21 RASH: Primary | ICD-10-CM

## 2024-03-24 DIAGNOSIS — R30.0 DYSURIA: ICD-10-CM

## 2024-03-24 LAB
APPEARANCE UR: NORMAL
BILIRUB UR QL: NEGATIVE
COLOR UR: YELLOW
GLUCOSE UR STRIP.AUTO-MCNC: NEGATIVE MG/DL
HGB UR QL STRIP: NEGATIVE
KETONES UR QL STRIP.AUTO: NEGATIVE MG/DL
LEUKOCYTE ESTERASE UR QL STRIP.AUTO: NEGATIVE
NITRITE UR QL STRIP.AUTO: NEGATIVE
PH UR STRIP: 6 (ref 5–8)
PROT UR STRIP-MCNC: NEGATIVE MG/DL
SP GR UR REFRACTOMETRY: 1.02 (ref 1–1.03)
UROBILINOGEN UR QL STRIP.AUTO: 0.2 EU/DL (ref 0.2–1)

## 2024-03-24 PROCEDURE — 99283 EMERGENCY DEPT VISIT LOW MDM: CPT

## 2024-03-24 PROCEDURE — 81003 URINALYSIS AUTO W/O SCOPE: CPT

## 2024-03-24 ASSESSMENT — LIFESTYLE VARIABLES
HOW OFTEN DO YOU HAVE A DRINK CONTAINING ALCOHOL: NEVER
HOW MANY STANDARD DRINKS CONTAINING ALCOHOL DO YOU HAVE ON A TYPICAL DAY: PATIENT DOES NOT DRINK

## 2024-03-24 ASSESSMENT — PAIN - FUNCTIONAL ASSESSMENT: PAIN_FUNCTIONAL_ASSESSMENT: NONE - DENIES PAIN

## 2024-03-24 NOTE — ED PROVIDER NOTES
[facility] EMERGENCY DEPT  eMERGENCY dEPARTMENT eNCOUnter        Pt Name: Rekha Soto  MRN: [id number]  Birthdate 1970  Date of evaluation: 3/24/2024  Provider: Tom Beach MD  PCP: Danii Chirinos MD  Note Started: 8:49 AM EDT 3/24/2024          CHIEF COMPLAINT       Chief Complaint   Patient presents with    Rash    Urinary Frequency       HISTORY OF PRESENT ILLNESS        Patient reportedly works in the healthcare field, she is presenting with a pruritic rash over the last several days.  She is concerned that she might have been exposed to scabies in her work.  She does not know of an actual exposure.  She has some areas of itchiness behind both knees, an area on the left anterior chest and an area on the left mid back.  No treatment tried at home.  In addition she is complaining of some increased urinary frequency and some dysuria.  No fevers.  No flank pain.  No nausea or vomiting.    Nursing Notes were all reviewed and agreed with or any disagreements were addressed  in the HPI.    REVIEW OF SYSTEMS         The following 10 systems are reviewed and negative except as noted in the HPI: Constitutional, Eyes, ENT, cardiovascular, pulmonary, GI, , neuro, skin, and musculoskeletal       PASTMEDICAL HISTORY     Past Medical History:   Diagnosis Date    Acid reflux     Acute cystitis with hematuria     GERD (gastroesophageal reflux disease)     Heel spur     HTN (hypertension), benign 2009    Menorrhagia     Plantar fasciitis     Shoulder bursitis     Urticaria     Vulvovaginitis          SURGICAL HISTORY       Past Surgical History:   Procedure Laterality Date    CHOLECYSTECTOMY      GYN  2005    BTL    GYN  2003             CURRENT MEDICATIONS       Previous Medications    ALBUTEROL SULFATE HFA (PROVENTIL;VENTOLIN;PROAIR) 108 (90 BASE) MCG/ACT INHALER    Inhale 2 puffs into the lungs every 6 hours as needed for Wheezing    BENZONATATE (TESSALON) 200 MG CAPSULE    TAKE (ORAL) 3 TIMES

## 2024-03-24 NOTE — ED TRIAGE NOTES
Patient reports having been exposed to scabies at her job last Saturday. Also reports frequency and foul smelling urine about 2 days prior.

## 2024-03-24 NOTE — DISCHARGE INSTRUCTIONS
Your urinalysis is unremarkable today.  Follow-up with primary care if your urinary symptoms persist into the coming week.  As discussed, your rash is not particularly suggestive of scabies, however, if you are concerned for this possibility you may use an over-the-counter topical medication.  I am providing information on scabies, though again, your rash is not necessarily consistent with that.  If treating yourself for scabies, you must wash all clothing and bedding.

## 2024-04-08 ENCOUNTER — OFFICE VISIT (OUTPATIENT)
Age: 54
End: 2024-04-08
Payer: MEDICAID

## 2024-04-08 VITALS
SYSTOLIC BLOOD PRESSURE: 126 MMHG | RESPIRATION RATE: 16 BRPM | DIASTOLIC BLOOD PRESSURE: 84 MMHG | HEIGHT: 62 IN | BODY MASS INDEX: 49.61 KG/M2 | TEMPERATURE: 97 F | OXYGEN SATURATION: 98 % | HEART RATE: 77 BPM | WEIGHT: 269.6 LBS

## 2024-04-08 DIAGNOSIS — I10 ESSENTIAL HYPERTENSION: Primary | ICD-10-CM

## 2024-04-08 DIAGNOSIS — R73.01 IMPAIRED FASTING BLOOD SUGAR: ICD-10-CM

## 2024-04-08 DIAGNOSIS — L29.9 SKIN PRURITUS: ICD-10-CM

## 2024-04-08 DIAGNOSIS — E04.9 ENLARGED THYROID GLAND: ICD-10-CM

## 2024-04-08 DIAGNOSIS — N89.8 VAGINAL DISCHARGE: ICD-10-CM

## 2024-04-08 PROCEDURE — 3074F SYST BP LT 130 MM HG: CPT | Performed by: FAMILY MEDICINE

## 2024-04-08 PROCEDURE — 3079F DIAST BP 80-89 MM HG: CPT | Performed by: FAMILY MEDICINE

## 2024-04-08 PROCEDURE — 99214 OFFICE O/P EST MOD 30 MIN: CPT | Performed by: FAMILY MEDICINE

## 2024-04-08 RX ORDER — FLUCONAZOLE 150 MG/1
TABLET ORAL
COMMUNITY
Start: 2024-04-07

## 2024-04-08 RX ORDER — TRIAMCINOLONE ACETONIDE 1 MG/G
CREAM TOPICAL
COMMUNITY
Start: 2024-03-21 | End: 2024-04-08 | Stop reason: SDUPTHER

## 2024-04-08 RX ORDER — TRIAMCINOLONE ACETONIDE 1 MG/G
CREAM TOPICAL
Qty: 45 G | Refills: 0 | Status: SHIPPED | OUTPATIENT
Start: 2024-04-08

## 2024-04-08 RX ORDER — PERMETHRIN 50 MG/G
CREAM TOPICAL
COMMUNITY
Start: 2024-03-28 | End: 2024-04-08 | Stop reason: SDUPTHER

## 2024-04-08 RX ORDER — PERMETHRIN 50 MG/G
CREAM TOPICAL
Qty: 60 G | Refills: 0 | Status: SHIPPED | OUTPATIENT
Start: 2024-04-08

## 2024-04-08 RX ORDER — HYDROXYZINE 50 MG/1
TABLET, FILM COATED ORAL
COMMUNITY
Start: 2024-03-28

## 2024-04-08 RX ORDER — CETIRIZINE HYDROCHLORIDE 10 MG/1
10 TABLET ORAL DAILY
Qty: 30 TABLET | Refills: 1 | Status: SHIPPED | OUTPATIENT
Start: 2024-04-08

## 2024-04-08 SDOH — ECONOMIC STABILITY: FOOD INSECURITY: WITHIN THE PAST 12 MONTHS, THE FOOD YOU BOUGHT JUST DIDN'T LAST AND YOU DIDN'T HAVE MONEY TO GET MORE.: NEVER TRUE

## 2024-04-08 SDOH — ECONOMIC STABILITY: FOOD INSECURITY: WITHIN THE PAST 12 MONTHS, YOU WORRIED THAT YOUR FOOD WOULD RUN OUT BEFORE YOU GOT MONEY TO BUY MORE.: NEVER TRUE

## 2024-04-08 SDOH — ECONOMIC STABILITY: INCOME INSECURITY: HOW HARD IS IT FOR YOU TO PAY FOR THE VERY BASICS LIKE FOOD, HOUSING, MEDICAL CARE, AND HEATING?: VERY HARD

## 2024-04-08 ASSESSMENT — PATIENT HEALTH QUESTIONNAIRE - PHQ9
SUM OF ALL RESPONSES TO PHQ QUESTIONS 1-9: 0
2. FEELING DOWN, DEPRESSED OR HOPELESS: NOT AT ALL
1. LITTLE INTEREST OR PLEASURE IN DOING THINGS: NOT AT ALL
SUM OF ALL RESPONSES TO PHQ QUESTIONS 1-9: 0
SUM OF ALL RESPONSES TO PHQ9 QUESTIONS 1 & 2: 0

## 2024-04-08 NOTE — PROGRESS NOTES
\"Have you been to the ER, urgent care clinic since your last visit?  Hospitalized since your last visit?\"    [facility] 3/25/24 for rash and dysuria. [facility] LOV: 4/06/24 and two weeks ago 3/28/24 - [facility].    “Have you seen or consulted any other health care providers outside of [facility] since your last visit?”    NO    Have you had a mammogram?”   NO    No breast cancer screening on file      “Have you had a pap smear?”    YES - Where: [facility] 2023 for pap smear.     Date of last Cervical Cancer screen (HPV or PAP): 10/28/2016         “Have you had a colorectal cancer screening such as a colonoscopy/FIT/Cologuard?    NO    No colonoscopy on file  No cologuard on file  No FIT/FOBT on file   No flexible sigmoidoscopy on file     Chief Complaint   Patient presents with    Blood Sugar Problem    Hypertension    Thyroid Problem     Enlarged thyroid gland    Vaginal Discharge     Vaginal discharge with odor           Click Here for Release of Records Request

## 2024-04-08 NOTE — PROGRESS NOTES
Rekha Soto is a 53 y.o. female complains of   Chief Complaint   Patient presents with    Blood Sugar Problem    Hypertension    Thyroid Problem     Enlarged thyroid gland    Vaginal Discharge     Vaginal discharge with odor       HPI: Here for follow-up.  History of hypertension, prediabetes, palpable enlarged thyroid gland, high BMI.  He sent exposure to scabies.  Had pruritus all over and between the fingers.  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  She was treated with Elimite cream by [facility].  Currently pruritus and itching has been improved but she needed a refill on her medication as still gets on and off.  No burrowing noted on the skin.  With scratching she has some scratching or upper extremity mainly right side.  No open skin or any discharge.  No pain.  Also had a vaginal discharge.  Was given Diflucan yesterday and has taken NuSwab sample from [facility].  Advised patient to observe for 72 hours.  Still symptomatic call back and we can consider doing new swab and meantime we will results.  Patient understood it well.    Thyroid function and thyroid ultrasound both showed no acute finding.  No trouble swallowing or any change in voice.  Discussed high BMI.  Work on diet and lifestyle modification which she is working on.  Exercise importance been discussed.  Prediabetes.  Diet and lifestyle modification again discussed.  Recheck labs prior next visit  Vitals been stable.  Denies any headache, dizziness, no chest pain or trouble breathing, no arm or leg weakness. No nausea or vomiting, no weight or appetite changes, no mood changes . No urine or bowel complains, no palpitation, no diaphoresis. No abdominal pain. No cold or cough. No leg swelling. No fever. No sleep trouble.   CMP:  Lab Results   Component Value Date/Time     10/16/2023 11:41 AM    K 3.6 10/16/2023 11:41 AM     10/16/2023 11:41 AM    CO2 27 10/16/2023 11:41 AM    BUN 10 10/16/2023 11:41 AM

## 2024-04-29 ENCOUNTER — TELEMEDICINE (OUTPATIENT)
Age: 54
End: 2024-04-29
Payer: MEDICAID

## 2024-04-29 DIAGNOSIS — J06.9 VIRAL URI: ICD-10-CM

## 2024-04-29 DIAGNOSIS — R09.89 RUNNY NOSE: Primary | ICD-10-CM

## 2024-04-29 DIAGNOSIS — R05.2 SUBACUTE COUGH: ICD-10-CM

## 2024-04-29 PROCEDURE — 99213 OFFICE O/P EST LOW 20 MIN: CPT | Performed by: FAMILY MEDICINE

## 2024-04-29 RX ORDER — MONTELUKAST SODIUM 10 MG/1
10 TABLET ORAL DAILY
Qty: 30 TABLET | Refills: 0 | Status: SHIPPED | OUTPATIENT
Start: 2024-04-29

## 2024-04-29 NOTE — PROGRESS NOTES
\"Have you been to the ER, urgent care clinic since your last visit?  Hospitalized since your last visit?\"    No  “Have you seen or consulted any other health care providers outside of [facility] since your last visit?”    NO    Have you had a mammogram?”   NO    No breast cancer screening on file      “Have you had a pap smear?”    NO    Date of last Cervical Cancer screen (HPV or PAP): 10/28/2016         “Have you had a colorectal cancer screening such as a colonoscopy/FIT/Cologuard?    NO    No colonoscopy on file  No cologuard on file  No FIT/FOBT on file   No flexible sigmoidoscopy on file         Click Here for Release of Records Request  
rhinorrhea. She is uncertain if these symptoms are indicative of a viral or allergy. Her granddaughter, who recently returned from school, was ill. She reports an improvement in her condition today, with no fever or sore throat present since Friday. The rhinorrhea, headaches, and nasal congestion are her primary concern. She has administered ibuprofen for headache and Zyrtec for rhinorrhea and ocular discharge, both of which have been beneficial. Her cough is productive, yielding clear sputum. She denies experiencing chest pain or shortness of breath. She is a non-smoker and denies any history of asthma or COPD. She is also using Flonase.   She works as a CNA.  Denies any chest pain or sob. No palpitation or diaphoresis. No abdominal pain. No nausea or vomiting. No urinary or bowel complains.     Review of Systems: see above        Objective   There were no vitals taken for this visit.  Physical Exam  General: looks comfortable on virtual visit.   No audible wheezing.  Able to talk in full sentence. No use of any extra respiratory muscle use.            The patient (or guardian, if applicable) and other individuals in attendance with the patient were advised that Artificial Intelligence will be utilized during this visit to record and process the conversation to generate a clinical note. The patient (or guardian, if applicable) and other individuals in attendance at the appointment consented to the use of AI, including the recording.      An electronic signature was used to authenticate this note.    Rekha Soto is a 53 y.o. female being evaluated by a video visit encounter for concerns as above.  A caregiver was present when appropriate. Due to this being a TeleHealth encounter (During COVID-19 public health emergency), evaluation of the following organ systems was limited: Vitals/Constitutional/EENT/Resp/CV/GI//MS/Neuro/Skin/Heme-Lymph-Imm.  Pursuant to the emergency declaration under the [place] Act and

## 2024-05-08 NOTE — TELEPHONE ENCOUNTER
This pharmacy faxed over request for the following prescriptions to be filled:    Medication requested :   Requested Prescriptions     Pending Prescriptions Disp Refills    albuterol sulfate HFA (PROVENTIL;VENTOLIN;PROAIR) 108 (90 Base) MCG/ACT inhaler [Pharmacy Med Name: ALBUTEROL HFA INH(200 PUFFS) 18GM] 18 g 0     Sig: INHALE 2 PUFFS BY MOUTH EVERY 6 HOURS AS NEEDED FOR WHEEZING     PCP: Taran  Pharmacy or Print: [facility]   Mail order or Local pharmacy [address] [place] [address]     Scheduled appointment if not seen by current providers in office: LOV 4/29/2024 fu 10/8/2024 pt is out and thought that Dr Chirinos had filled on 4/29/2024. Please advise.

## 2024-05-09 RX ORDER — ALBUTEROL SULFATE 90 UG/1
2 AEROSOL, METERED RESPIRATORY (INHALATION) EVERY 6 HOURS PRN
Qty: 18 G | Refills: 0 | Status: SHIPPED | OUTPATIENT
Start: 2024-05-09

## 2024-05-20 ENCOUNTER — TELEPHONE (OUTPATIENT)
Age: 54
End: 2024-05-20

## 2024-05-20 RX ORDER — MELATONIN
1000 DAILY
Qty: 90 TABLET | Refills: 5 | Status: SHIPPED | OUTPATIENT
Start: 2024-05-20

## 2024-05-20 NOTE — TELEPHONE ENCOUNTER
This pharmacy faxed over request for the following prescriptions to be filled:     Medication requested :   Requested Prescriptions    Vitamin D 1,000 IU H/PTNCY Capsules    PCP: Taran  Pharmacy or Print: [facility]   Mail order or Local pharmacy [address] [place] [address]      Scheduled appointment if not seen by current providers in office: LOV 4/29/2024 nazario 10/8/2024

## 2024-06-25 ENCOUNTER — TELEPHONE (OUTPATIENT)
Facility: CLINIC | Age: 54
End: 2024-06-25

## 2024-06-25 NOTE — TELEPHONE ENCOUNTER
Left VM for patient to return call to practice.           ----- Message from Marjorie Reardonjonnie Elizabeth James sent at 6/25/2024 11:07 AM EDT -----  Regarding: ECC Message to Provider  ECC Message to Provider    Relationship to Patient: Self     Additional Information Patient wanted to call her back about the medication refill.   --------------------------------------------------------------------------------------------------------------------------    Call Back Information: OK to leave message on voicemail  Preferred Call Back Number: Phone [phone]

## 2024-08-06 ENCOUNTER — TELEPHONE (OUTPATIENT)
Age: 54
End: 2024-08-06

## 2024-08-06 NOTE — TELEPHONE ENCOUNTER
Patient called to req a rx for Flexeril for her sciatic pain to hold her until her appt on Thursday 08/08.    Pharmacy:    [id number] [address] [phone] - F [phone] [[id number]]

## 2024-08-07 NOTE — TELEPHONE ENCOUNTER
I tried calling the pt back and the phone number listed is no longer in service and there are no back up numbers listed. Pt is scheduled to see Dr. Gibbs tomorrow, Pt can discuss then.

## 2024-08-08 ENCOUNTER — TELEPHONE (OUTPATIENT)
Age: 54
End: 2024-08-08

## 2024-08-08 ENCOUNTER — TELEPHONE (OUTPATIENT)
Facility: CLINIC | Age: 54
End: 2024-08-08

## 2024-08-08 NOTE — TELEPHONE ENCOUNTER
Called patient and used two identifiers, advised this must be an in person visit. Rescheduled to next available. Patient verbalized understanding with intent to comply.

## 2024-08-08 NOTE — TELEPHONE ENCOUNTER
Patient states she is not feeling well, and unable to come to her appt this morning at 9:15 a.m. Patient is asking if she can have a virtual visit instead.     Patient can be reached at [phone].

## 2024-08-08 NOTE — TELEPHONE ENCOUNTER
This patient contacted office for the following prescriptions to be filled:    Medication requested :  Ibuprofen 800 MG    PCP:  DR. SHERWOOD   Pharmacy or Print:  [facility]   Mail order or Local pharmacy [facility] [address]      Scheduled appointment if not seen by current providers in office: 10/08/2024

## 2024-08-12 RX ORDER — IBUPROFEN 800 MG/1
800 TABLET, FILM COATED ORAL EVERY 8 HOURS PRN
Qty: 90 TABLET | Refills: 0 | Status: SHIPPED | OUTPATIENT
Start: 2024-08-12

## 2024-08-22 ENCOUNTER — TELEPHONE (OUTPATIENT)
Facility: CLINIC | Age: 54
End: 2024-08-22

## 2024-08-22 NOTE — TELEPHONE ENCOUNTER
----- Message from Eloy MARIE sent at 8/21/2024 11:44 AM EDT -----  Regarding: ECC Referral Request  ECC Referral Request    Reason for referral request: Lab/Test Order    Specialist/Lab/Test patient is requesting (if known): Danii Chirinos MD    Specialist Phone Number (if applicable):     Additional Information order for lab work  --------------------------------------------------------------------------------------------------------------------------    Relationship to Patient: Self     Call Back Information: OK to leave message on voicemail  Preferred Call Back Number: Phone   [phone]
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
## 2024-08-29 ENCOUNTER — OFFICE VISIT (OUTPATIENT)
Age: 54
End: 2024-08-29
Payer: MEDICAID

## 2024-08-29 VITALS — HEIGHT: 62 IN | BODY MASS INDEX: 49.87 KG/M2 | TEMPERATURE: 97 F | WEIGHT: 271 LBS

## 2024-08-29 DIAGNOSIS — M54.16 LUMBAR NEURITIS: ICD-10-CM

## 2024-08-29 DIAGNOSIS — M47.814 THORACIC SPONDYLOSIS: Primary | ICD-10-CM

## 2024-08-29 DIAGNOSIS — M51.36 DDD (DEGENERATIVE DISC DISEASE), LUMBAR: ICD-10-CM

## 2024-08-29 PROCEDURE — 99214 OFFICE O/P EST MOD 30 MIN: CPT | Performed by: PHYSICAL MEDICINE & REHABILITATION

## 2024-08-29 PROCEDURE — 72084 X-RAY EXAM ENTIRE SPI 6/> VW: CPT | Performed by: PHYSICAL MEDICINE & REHABILITATION

## 2024-08-29 RX ORDER — IBUPROFEN 800 MG/1
800 TABLET, FILM COATED ORAL 3 TIMES DAILY PRN
Qty: 90 TABLET | Refills: 0 | Status: SHIPPED | OUTPATIENT
Start: 2024-08-29

## 2024-08-29 RX ORDER — PREGABALIN 75 MG/1
75 CAPSULE ORAL 2 TIMES DAILY
Qty: 60 CAPSULE | Refills: 2 | Status: SHIPPED | OUTPATIENT
Start: 2024-08-29 | End: 2024-11-27

## 2024-08-30 DIAGNOSIS — M54.16 LUMBAR NEURITIS: ICD-10-CM

## 2024-08-30 DIAGNOSIS — M47.814 THORACIC SPONDYLOSIS: Primary | ICD-10-CM

## 2024-08-30 DIAGNOSIS — M51.36 DDD (DEGENERATIVE DISC DISEASE), LUMBAR: ICD-10-CM

## 2024-08-30 RX ORDER — CYCLOBENZAPRINE HCL 10 MG
10 TABLET ORAL NIGHTLY PRN
Qty: 30 TABLET | Refills: 0 | Status: SHIPPED | OUTPATIENT
Start: 2024-08-30 | End: 2024-09-29

## 2024-08-30 NOTE — TELEPHONE ENCOUNTER
Patient called to request a rx for Flexeril and asked if she'll be able to take that along with the Pregabalin and the Ibuprofen at night.    Please advise.    Patient can be reached at [phone].          Pharmacy:    [id number] - [place], [place] - [address] -  [phone] F [phone] [address] [place] [id number]  Phone: [phone]  Fax: [phone]

## 2024-08-30 NOTE — TELEPHONE ENCOUNTER
Med pended for provider sig.    I have attempted to contact this patient by phone with the following results: voicemail box full.

## 2024-09-10 ENCOUNTER — TELEPHONE (OUTPATIENT)
Facility: HOSPITAL | Age: 54
End: 2024-09-10

## 2024-09-27 RX ORDER — CHOLECALCIFEROL (VITAMIN D3) 25 MCG
1000 TABLET ORAL DAILY
Qty: 90 TABLET | Refills: 5 | Status: SHIPPED | OUTPATIENT
Start: 2024-09-27 | End: 2024-10-15 | Stop reason: SDUPTHER

## 2024-09-27 RX ORDER — ALBUTEROL SULFATE 90 UG/1
2 INHALANT RESPIRATORY (INHALATION) EVERY 6 HOURS PRN
Qty: 18 G | Refills: 0 | Status: SHIPPED | OUTPATIENT
Start: 2024-09-27

## 2024-10-15 ENCOUNTER — OFFICE VISIT (OUTPATIENT)
Facility: CLINIC | Age: 54
End: 2024-10-15
Payer: MEDICAID

## 2024-10-15 VITALS
DIASTOLIC BLOOD PRESSURE: 80 MMHG | BODY MASS INDEX: 49.13 KG/M2 | TEMPERATURE: 97.6 F | SYSTOLIC BLOOD PRESSURE: 138 MMHG | HEART RATE: 74 BPM | HEIGHT: 62 IN | OXYGEN SATURATION: 99 % | WEIGHT: 267 LBS | RESPIRATION RATE: 16 BRPM

## 2024-10-15 DIAGNOSIS — M54.16 LUMBAR NEURITIS: ICD-10-CM

## 2024-10-15 DIAGNOSIS — G89.29 CHRONIC BILATERAL LOW BACK PAIN WITH LEFT-SIDED SCIATICA: ICD-10-CM

## 2024-10-15 DIAGNOSIS — N89.8 VAGINAL ITCHING: ICD-10-CM

## 2024-10-15 DIAGNOSIS — R23.2 HOT FLASHES: ICD-10-CM

## 2024-10-15 DIAGNOSIS — R73.01 IMPAIRED FASTING BLOOD SUGAR: ICD-10-CM

## 2024-10-15 DIAGNOSIS — R20.0 NUMBNESS AND TINGLING: Primary | ICD-10-CM

## 2024-10-15 DIAGNOSIS — F41.9 ANXIETY: ICD-10-CM

## 2024-10-15 DIAGNOSIS — M54.42 CHRONIC BILATERAL LOW BACK PAIN WITH LEFT-SIDED SCIATICA: ICD-10-CM

## 2024-10-15 DIAGNOSIS — R19.5 POSITIVE FIT (FECAL IMMUNOCHEMICAL TEST): ICD-10-CM

## 2024-10-15 DIAGNOSIS — M47.814 THORACIC SPONDYLOSIS: ICD-10-CM

## 2024-10-15 DIAGNOSIS — E04.1 THYROID CYST: ICD-10-CM

## 2024-10-15 DIAGNOSIS — Z12.31 ENCOUNTER FOR SCREENING MAMMOGRAM FOR MALIGNANT NEOPLASM OF BREAST: ICD-10-CM

## 2024-10-15 DIAGNOSIS — R20.2 NUMBNESS AND TINGLING: Primary | ICD-10-CM

## 2024-10-15 DIAGNOSIS — Z23 NEED FOR VACCINATION: ICD-10-CM

## 2024-10-15 DIAGNOSIS — I10 ESSENTIAL HYPERTENSION: ICD-10-CM

## 2024-10-15 PROCEDURE — 99214 OFFICE O/P EST MOD 30 MIN: CPT | Performed by: FAMILY MEDICINE

## 2024-10-15 PROCEDURE — 3079F DIAST BP 80-89 MM HG: CPT | Performed by: FAMILY MEDICINE

## 2024-10-15 PROCEDURE — 3075F SYST BP GE 130 - 139MM HG: CPT | Performed by: FAMILY MEDICINE

## 2024-10-15 PROCEDURE — 90471 IMMUNIZATION ADMIN: CPT | Performed by: FAMILY MEDICINE

## 2024-10-15 PROCEDURE — 90661 CCIIV3 VAC ABX FR 0.5 ML IM: CPT | Performed by: FAMILY MEDICINE

## 2024-10-15 RX ORDER — IBUPROFEN 400 MG/1
400 TABLET, FILM COATED ORAL EVERY 8 HOURS PRN
Qty: 90 TABLET | Refills: 0 | Status: SHIPPED | OUTPATIENT
Start: 2024-10-15

## 2024-10-15 RX ORDER — LISINOPRIL AND HYDROCHLOROTHIAZIDE 20; 25 MG/1; MG/1
1 TABLET ORAL DAILY
Qty: 90 TABLET | Refills: 1 | Status: SHIPPED | OUTPATIENT
Start: 2024-10-15

## 2024-10-15 RX ORDER — BUSPIRONE HYDROCHLORIDE 5 MG/1
5 TABLET ORAL 2 TIMES DAILY
Qty: 60 TABLET | Refills: 2 | Status: SHIPPED | OUTPATIENT
Start: 2024-10-15

## 2024-10-15 RX ORDER — FLUCONAZOLE 150 MG/1
150 TABLET ORAL ONCE
Qty: 1 TABLET | Refills: 0 | Status: SHIPPED | OUTPATIENT
Start: 2024-10-15 | End: 2024-10-15

## 2024-10-15 RX ORDER — METOPROLOL TARTRATE 100 MG
100 TABLET ORAL DAILY
Qty: 180 TABLET | Refills: 1 | Status: SHIPPED | OUTPATIENT
Start: 2024-10-15

## 2024-10-15 RX ORDER — CHOLECALCIFEROL (VITAMIN D3) 25 MCG
1000 TABLET ORAL DAILY
Qty: 90 TABLET | Refills: 5 | Status: SHIPPED | OUTPATIENT
Start: 2024-10-15

## 2024-10-15 ASSESSMENT — PATIENT HEALTH QUESTIONNAIRE - PHQ9
SUM OF ALL RESPONSES TO PHQ QUESTIONS 1-9: 0
2. FEELING DOWN, DEPRESSED OR HOPELESS: NOT AT ALL
SUM OF ALL RESPONSES TO PHQ9 QUESTIONS 1 & 2: 0
1. LITTLE INTEREST OR PLEASURE IN DOING THINGS: NOT AT ALL
SUM OF ALL RESPONSES TO PHQ QUESTIONS 1-9: 0

## 2024-10-15 NOTE — PROGRESS NOTES
Administered injection of FLUCELVAX given 0.5 mL in Left deltoid , LOT [id number], EXP [id number], NDC 55258-321-00, Tolerated well, left with no adverse reactions.    
\"Have you been to the ER, urgent care clinic since your last visit?  Hospitalized since your last visit?\"    NO    “Have you seen or consulted any other health care providers outside our system since your last visit?”    NO    Have you had a mammogram?”   NO    No breast cancer screening on file      “Have you had a pap smear?”    Per [facility], last pap smear was done in May 2021. They will fax most recent office note and most recent pap result from 2021 to Dr. Chirinos. Patient states she saw [facility] in March 2024 for pap smear. We will obtain record for exam.      “Have you had a colorectal cancer screening such as a colonoscopy/FIT/Cologuard?    NO - patient states she has colonoscopy scheduled with [facility] in 12/2024.    No colonoscopy on file  No cologuard on file  No FIT/FOBT on file   No flexible sigmoidoscopy on file     Chief Complaint   Patient presents with    Hypertension    Blood Sugar Problem    Thyroid Problem     Enlarged thyroid gland    Skin Problem     Skin pruritis    Referral - General     For sciatica pain - patient requested to see [facility] Dr. Razia Gibbs    Orders     mammogram            
palpitations. She is currently going through menopause and experiences hot flashes. She has tried over-the-counter black cohosh for this. Her menstrual periods have ceased naturally. She had a Pap smear in 05/2024. She has lost weight and wants to ensure it is not due to a thyroid issue. She reports a change in appetite, which she attributes to aging. She reports no gastrointestinal issues such as stomach pain, constipation, or diarrhea.    She is experiencing significant anxiety and is seeking help for this.    Review of Systems       Objective   Blood pressure 138/80, pulse 74, temperature 97.6 °F (36.4 °C), temperature source Temporal, resp. rate 16, height 1.575 m (5' 2\"), weight 121.1 kg (267 lb), SpO2 99%.  Physical Exam  Patient is sitting comfortably without any acute distress.  Thyroid gland is generalized enlarged and nontender.  Lungs are clear.  Heart rhythm is regular.  Abdomen is nontender.  No edema in extremities.  Patient is awake, alert, oriented to time, place and person.  Behavior is normal.    Vital Signs  Weight is 267.           The patient (or guardian, if applicable) and other individuals in attendance with the patient were advised that Artificial Intelligence will be utilized during this visit to record, process the conversation to generate a clinical note and to support improvement of the AI technology. The patient (or guardian, if applicable) and other individuals in attendance at the appointment consented to the use of AI, including the recording.      An electronic signature was used to authenticate this note.    --Danii Chirinos MD

## 2024-10-15 NOTE — PATIENT INSTRUCTIONS
your health care provider.  Adverse reactions should be reported to the Vaccine Adverse Event Reporting System (VAERS). Your health care provider will usually file this report, or you can do it yourself. Visit the VAERS website at www.vaers.[facility].gov or call 1-214.834.3884. VAERS is only for reporting reactions, and VAERS staff members do not give medical advice.  The National Vaccine Injury Compensation Program  The National Vaccine Injury Compensation Program (VICP) is a federal program that was created to compensate people who may have been injured by certain vaccines. Claims regarding alleged injury or death due to vaccination have a time limit for filing, which may be as short as two years. Visit the VICP website at www.[facility].gov/vaccinecompensation or call 1-173.999.2387 to learn about the program and about filing a claim.  How can I learn more?  Ask your health care provider.  Call your local or state health department.  Visit the website of the Food and Drug Administration (FDA) for vaccine package inserts and additional information at www.fda.gov/vaccines-blood-biologics/vaccines.  Contact the Centers for Disease Control and Prevention (CDC):  Call 1-292.904.7151 (0-579-WIV-INFO) or  Visit CDC's website at www.cdc.gov/flu.  Vaccine Information Statement  Inactivated Influenza Vaccine  8/6/2021  42 U.S.C. § 300aa-26  Department of Health and Human Services  Centers for Disease Control and Prevention  Many vaccine information statements are available in Malay and other languages. See www.immunize.org/vis.  Hojas de información sobre vacunas están disponibles en español y en muchos otros idiomas. Visite www.immunize.org/vis.  Care instructions adapted under license by [employer]. If you have questions about a medical condition or this instruction, always ask your healthcare professional. [employer] disclaims any warranty or liability for your use of this information.

## 2024-11-04 ENCOUNTER — OFFICE VISIT (OUTPATIENT)
Age: 54
End: 2024-11-04
Payer: MEDICAID

## 2024-11-04 VITALS — TEMPERATURE: 98 F | BODY MASS INDEX: 49.69 KG/M2 | WEIGHT: 270 LBS | HEIGHT: 62 IN

## 2024-11-04 DIAGNOSIS — M54.16 LUMBAR NEURITIS: ICD-10-CM

## 2024-11-04 DIAGNOSIS — M47.814 THORACIC SPONDYLOSIS: Primary | ICD-10-CM

## 2024-11-04 DIAGNOSIS — M51.362 DEGENERATION OF INTERVERTEBRAL DISC OF LUMBAR REGION WITH DISCOGENIC BACK PAIN AND LOWER EXTREMITY PAIN: ICD-10-CM

## 2024-11-04 PROCEDURE — 99214 OFFICE O/P EST MOD 30 MIN: CPT | Performed by: PHYSICAL MEDICINE & REHABILITATION

## 2024-11-04 NOTE — PROGRESS NOTES
NEELAM Guadalupe dated 9/10/19  indicating patient was seen with c/o upper back and posterior left shoulder pain since day prior. Non-radiating, Believed it was muscular in nature. Gave pt MDP, Flexeril, and antiinflammatories. Note  from Dr. Perez dated 12/29/2021 indicating patient was seen with c/o left buttocks pain into the left leg worse with activity without injury. Pain level 8/10. Limitations w/ standing. Left sided  sciatica and provided trigger point injection. Referred to me. Note from Bina Lakhani NP dated 6/23/2022 indicating patient presented for low back pain. Provided her  with NSAIDs and muscle relaxers and sent her to PT. Preliminary reading of thoracic plain films: Limited by body habits. Age appropriate  degenerative changes. No acute pathology identified. Preliminary reading of lumbar plain films: Mild S-shaped thoracolumbar scoliosis.  Grade1 anterolisthesis of L4 on L5. Moderate disc space narrowing at L4-5 and L5-S1. No acute pathology identified. I weaned her off Topamax. I tried her on Cymbalta  30 mg QHS. I recommended she discontinue taking ibuprofen and increased her HEP to daily. Previously seen for low back pain and left thigh pain laterally. She rates her pain 0-7/10, previously 0-10/10. At last visit, patient was started on Cymbalta but was unable to take this in conjunction with daily Ibuprofen and was then started  on Lyrica 75 mg BID with relief and is tolerating it. She was disinterested in trial of Gabapentin secondary to fear of side effects. Pt denies change in bowel or bladder habits. Patient reports she has not been doing her daily HEP. Patient reports that  her pain is exacerbated with pushing, lifting, and pulling patients at work. Patient is neurologically intact. There are no indications for surgery at this time. I recommended she increase the frequency of HEP to daily. I will prescribe the patient Flexeril 10 mg every day PRN. I will increase the Lyrica to 150 mg BID.

## 2024-11-05 RX ORDER — CYCLOBENZAPRINE HCL 10 MG
TABLET ORAL
Qty: 30 TABLET | Refills: 0 | Status: SHIPPED | OUTPATIENT
Start: 2024-11-05

## 2024-11-11 ENCOUNTER — HOSPITAL ENCOUNTER (EMERGENCY)
Facility: HOSPITAL | Age: 54
Discharge: HOME | End: 2024-11-11
Attending: EMERGENCY MEDICINE
Payer: MEDICAID

## 2024-11-11 ENCOUNTER — APPOINTMENT (OUTPATIENT)
Facility: HOSPITAL | Age: 54
End: 2024-11-11
Payer: MEDICAID

## 2024-11-11 VITALS
BODY MASS INDEX: 50.98 KG/M2 | OXYGEN SATURATION: 97 % | TEMPERATURE: 98.7 F | HEIGHT: 61 IN | DIASTOLIC BLOOD PRESSURE: 77 MMHG | SYSTOLIC BLOOD PRESSURE: 124 MMHG | HEART RATE: 67 BPM | WEIGHT: 270 LBS | RESPIRATION RATE: 18 BRPM

## 2024-11-11 DIAGNOSIS — J01.00 ACUTE MAXILLARY SINUSITIS, RECURRENCE NOT SPECIFIED: Primary | ICD-10-CM

## 2024-11-11 LAB
FLUAV RNA SPEC QL NAA+PROBE: NOT DETECTED
FLUBV RNA SPEC QL NAA+PROBE: NOT DETECTED
SARS-COV-2 RNA RESP QL NAA+PROBE: NOT DETECTED
SOURCE: NORMAL

## 2024-11-11 PROCEDURE — 99284 EMERGENCY DEPT VISIT MOD MDM: CPT

## 2024-11-11 PROCEDURE — 71046 X-RAY EXAM CHEST 2 VIEWS: CPT

## 2024-11-11 PROCEDURE — 87636 SARSCOV2 & INF A&B AMP PRB: CPT

## 2024-11-11 RX ORDER — AMOXICILLIN 875 MG/1
875 TABLET, COATED ORAL 2 TIMES DAILY
Qty: 20 TABLET | Refills: 0 | Status: SHIPPED | OUTPATIENT
Start: 2024-11-11 | End: 2024-11-21

## 2024-11-11 RX ORDER — FLUTICASONE PROPIONATE 50 MCG
2 SPRAY, SUSPENSION (ML) NASAL DAILY
Qty: 16 G | Refills: 0 | Status: SHIPPED | OUTPATIENT
Start: 2024-11-11

## 2024-11-11 ASSESSMENT — PAIN SCALES - GENERAL: PAINLEVEL_OUTOF10: 0

## 2024-11-11 ASSESSMENT — PAIN - FUNCTIONAL ASSESSMENT: PAIN_FUNCTIONAL_ASSESSMENT: 0-10

## 2024-11-11 ASSESSMENT — LIFESTYLE VARIABLES
HOW MANY STANDARD DRINKS CONTAINING ALCOHOL DO YOU HAVE ON A TYPICAL DAY: PATIENT DOES NOT DRINK
HOW OFTEN DO YOU HAVE A DRINK CONTAINING ALCOHOL: NEVER

## 2024-11-11 NOTE — ED PROVIDER NOTES
[facility] EMERGENCY DEPT  EMERGENCY DEPARTMENT ENCOUNTER    Patient Name: Rekha Soto  MRN: [id number]  YOB: 1970  Provider: Terrance Smith DO  PCP: Danii Chirinos MD   Time/Date of evaluation: 12:41 PM EST on 24    History of Presenting Illness     History Provided by: Patient  History is limited by: Nothing     HISTORY:   Rekha Soto is a 53 y.o. female presents to the emergency room with a chief complaint of nasal congestion, postnasal drip, and nonproductive cough.  Patient states that her symptoms began 1 week ago.  She states that she initially experienced a mild sore throat which has resolved.  She states that the postnasal drip has been causing a cough and she has tenderness over her maxillary sinuses.  Patient also states that she lost her voice due to the constant postnasal drip.    Nursing Notes were all reviewed and agreed with or any disagreements were addressed in the HPI.    Past History     PAST MEDICAL HISTORY:  Past Medical History:   Diagnosis Date    Acid reflux     Acute cystitis with hematuria     GERD (gastroesophageal reflux disease)     Heel spur     HTN (hypertension), benign 2009    Menorrhagia     Plantar fasciitis     Shoulder bursitis     Urticaria     Vulvovaginitis        PAST SURGICAL HISTORY:  Past Surgical History:   Procedure Laterality Date    CHOLECYSTECTOMY      GYN  2005    BTL    GYN  2003           FAMILY HISTORY:  Family History   Problem Relation Age of Onset    Drug Abuse Brother     Diabetes Brother     Cancer Brother     Alzheimer's Disease Father     Substance Abuse Father         tobacco use    Hypertension Father     Stroke Father     High Blood Pressure Father     Cancer Mother     Rheum Arthritis Mother     Heart Failure Mother     Diabetes Mother     Alcohol Abuse Maternal Grandfather     Attention Deficit Disorder Daughter     Heart Attack Maternal Aunt     Heart Disease Brother        SOCIAL HISTORY:  Social History

## 2024-11-11 NOTE — ED TRIAGE NOTES
Patient presents to ER with c/o \"upper respiratory symptoms\" that started about a week ago. Patient states she originally had a sore throat that resolved.

## 2024-12-02 ENCOUNTER — TELEPHONE (OUTPATIENT)
Facility: CLINIC | Age: 54
End: 2024-12-02

## 2024-12-02 DIAGNOSIS — R73.01 IMPAIRED FASTING BLOOD SUGAR: Primary | ICD-10-CM

## 2024-12-02 DIAGNOSIS — R73.01 IMPAIRED FASTING BLOOD SUGAR: ICD-10-CM

## 2024-12-02 NOTE — TELEPHONE ENCOUNTER
Patient has lab orders from April that have . Please reorder. Patient states she plans  on going to have drawn tomorrow. She is aware she needs an appointment in January. She will call back to schedule when she has work schedule.

## 2024-12-04 NOTE — TELEPHONE ENCOUNTER
Called [phone] phone rang several times then went to a busy signal. Unable to leave message.   Labs have been ordered.   Encounter closed.

## 2024-12-11 ENCOUNTER — HOSPITAL ENCOUNTER (OUTPATIENT)
Facility: HOSPITAL | Age: 54
Setting detail: SPECIMEN
Discharge: HOME | End: 2024-12-14

## 2024-12-11 ENCOUNTER — OFFICE VISIT (OUTPATIENT)
Facility: CLINIC | Age: 54
End: 2024-12-11
Payer: MEDICAID

## 2024-12-11 VITALS
WEIGHT: 269.4 LBS | DIASTOLIC BLOOD PRESSURE: 70 MMHG | BODY MASS INDEX: 49.58 KG/M2 | SYSTOLIC BLOOD PRESSURE: 110 MMHG | HEART RATE: 73 BPM | TEMPERATURE: 97.2 F | HEIGHT: 62 IN | OXYGEN SATURATION: 98 % | RESPIRATION RATE: 16 BRPM

## 2024-12-11 DIAGNOSIS — N89.8 VAGINAL ODOR: ICD-10-CM

## 2024-12-11 DIAGNOSIS — R35.0 URINARY FREQUENCY: Primary | ICD-10-CM

## 2024-12-11 DIAGNOSIS — N89.8 VAGINAL ITCHING: ICD-10-CM

## 2024-12-11 DIAGNOSIS — N89.8 VAGINAL DISCHARGE: ICD-10-CM

## 2024-12-11 LAB
BILIRUBIN, URINE, POC: NEGATIVE
BLOOD URINE, POC: NEGATIVE
GLUCOSE URINE, POC: NEGATIVE
KETONES, URINE, POC: NEGATIVE
LEUKOCYTE ESTERASE, URINE, POC: NEGATIVE
NITRITE, URINE, POC: NEGATIVE
PH, URINE, POC: 6 (ref 4.6–8)
PROTEIN,URINE, POC: NEGATIVE
SENTARA SPECIMEN COLLECTION: NORMAL
SPECIFIC GRAVITY, URINE, POC: 1.02 (ref 1–1.03)
URINALYSIS CLARITY, POC: NORMAL
URINALYSIS COLOR, POC: YELLOW
UROBILINOGEN, POC: NORMAL

## 2024-12-11 PROCEDURE — 99213 OFFICE O/P EST LOW 20 MIN: CPT | Performed by: FAMILY MEDICINE

## 2024-12-11 PROCEDURE — 81003 URINALYSIS AUTO W/O SCOPE: CPT | Performed by: FAMILY MEDICINE

## 2024-12-11 PROCEDURE — 3074F SYST BP LT 130 MM HG: CPT | Performed by: FAMILY MEDICINE

## 2024-12-11 PROCEDURE — 99001 SPECIMEN HANDLING PT-LAB: CPT

## 2024-12-11 PROCEDURE — 3078F DIAST BP <80 MM HG: CPT | Performed by: FAMILY MEDICINE

## 2024-12-11 RX ORDER — METHOCARBAMOL 500 MG/1
TABLET, FILM COATED ORAL
COMMUNITY

## 2024-12-11 RX ORDER — FLUCONAZOLE 150 MG/1
150 TABLET ORAL ONCE
Qty: 1 TABLET | Refills: 0 | Status: SHIPPED | OUTPATIENT
Start: 2024-12-11 | End: 2024-12-11

## 2024-12-11 NOTE — PROGRESS NOTES
\"Have you been to the ER, urgent care clinic since your last visit?  Hospitalized since your last visit?\"    [facility] LOV: 11/11/24 for acute maxillary sinusitis. [facility] ED LOV: 10/27/24 for acute cystitis without hematuria.    “Have you seen or consulted any other health care providers outside our system since your last visit?”    NO    Have you had a mammogram?”   NO    No breast cancer screening on file      “Have you had a pap smear?”    Pap smear March 2024 at [facility], per patient. Record has been requested.    Date of last Cervical Cancer screen (HPV or PAP): 10/28/2016     Chief Complaint   Patient presents with    Pelvic pressure    Vaginal Discharge     Greyish discharge with vaginal odor x 1.5 weeks - wonders if she has BV or UTI    Vaginal Itching     X 1.5 weeks    Urinary Frequency     X 1.5 - 2 weeks

## 2024-12-11 NOTE — PROGRESS NOTES
Rekha Soto (:  1970) is a 54 y.o. female, Established patient, here for evaluation of the following chief complaint(s):  Pelvic pressure, Vaginal Discharge (Greyish discharge with vaginal odor x 1.5 weeks - wonders if she has BV or UTI), Vaginal Itching (X 1.5 weeks), and Urinary Frequency (X 1.5 - 2 weeks)         Assessment & Plan  1. Vaginal discharge.  The patient's recent antibiotic use may have contributed to her symptoms. A urine test showed no signs of infection. The presence of a fishy odor suggests a possible bacterial cause. She is advised to avoid excessive washing and to use only soap and water, avoiding wipes and fragrances. A new swab will be sent for further analysis. Diflucan will be prescribed to alleviate vaginal itching. If the swab results indicate a specific infection, appropriate treatment will be initiated.  Patient understood agreed with the plan  Follow-up if symptoms persist in 2 to 3 weeks  Will obtain Pap smear records from GYN.  If not done will advise to complete Pap smear as last one was done in .  Patient understood.  She will also inquire at GYN office as she had a new exam done at the beginning of the year.      Results  Laboratory Studies  Urine test showed no signs of infection.  1. Urinary frequency  -     AMB POC URINALYSIS DIP STICK AUTO W/O MICRO  2. Vaginal discharge  -     Nuswab Vaginitis with Candida (Six Species); Future  3. Vaginal odor  -     Nuswab Vaginitis with Candida (Six Species); Future  4. Vaginal itching  -     fluconazole (DIFLUCAN) 150 MG tablet; Take 1 tablet by mouth once for 1 dose, Disp-1 tablet, R-0Normal  -     Nuswab Vaginitis with Candida (Six Species); Future    Return if symptoms worsen or fail to improve, for as scheduled .       Subjective   History of Present Illness  The patient presents for evaluation of vaginal discharge.    She reports experiencing vaginal discharge characterized by a grayish color and a fish-like

## 2024-12-12 LAB
A/G RATIO: 1.2 RATIO (ref 1.1–2.6)
ALBUMIN: 4 G/DL (ref 3.5–5)
ALP BLD-CCNC: 82 U/L (ref 25–115)
ALT SERPL-CCNC: 19 U/L (ref 5–40)
ANION GAP SERPL CALCULATED.3IONS-SCNC: 10 MMOL/L (ref 3–15)
AST SERPL-CCNC: 18 U/L (ref 10–37)
BILIRUB SERPL-MCNC: 0.3 MG/DL (ref 0.2–1.2)
BUN BLDV-MCNC: 13 MG/DL (ref 6–22)
CALCIUM SERPL-MCNC: 9.6 MG/DL (ref 8.4–10.5)
CHLORIDE BLD-SCNC: 100 MMOL/L (ref 98–110)
CO2: 30 MMOL/L (ref 20–32)
CREAT SERPL-MCNC: 0.8 MG/DL (ref 0.5–1.2)
ESTIMATED AVERAGE GLUCOSE: 130 MG/DL (ref 91–123)
GFR, ESTIMATED: >60 ML/MIN/1.73 SQ.M.
GLOBULIN: 3.4 G/DL (ref 2–4)
GLUCOSE: 93 MG/DL (ref 70–99)
HBA1C MFR BLD: 6.2 % (ref 4.8–5.6)
POTASSIUM SERPL-SCNC: 4.1 MMOL/L (ref 3.5–5.5)
SODIUM BLD-SCNC: 140 MMOL/L (ref 133–145)
TOTAL PROTEIN: 7.4 G/DL (ref 6.4–8.3)

## 2024-12-13 LAB
BACTERIAL VAGINOSIS, NAA: NEGATIVE
CANDIDA GLABRATA, NAA: NEGATIVE
CANDIDA SPECIES, NAA: NEGATIVE

## 2025-01-02 ENCOUNTER — TELEPHONE (OUTPATIENT)
Age: 55
End: 2025-01-02

## 2025-01-02 NOTE — TELEPHONE ENCOUNTER
Pt is scheduled for 1/7/2025 at 9:15am. 1/8/2024 at 1:00pm. I was reaching out to the pt to see which appointment they would like to keep. I left a voicemail with my name and the office phone number requesting a call back.

## 2025-01-23 ENCOUNTER — OFFICE VISIT (OUTPATIENT)
Facility: CLINIC | Age: 55
End: 2025-01-23
Payer: MEDICAID

## 2025-01-23 VITALS
HEIGHT: 62 IN | WEIGHT: 271.6 LBS | RESPIRATION RATE: 16 BRPM | OXYGEN SATURATION: 98 % | HEART RATE: 69 BPM | SYSTOLIC BLOOD PRESSURE: 120 MMHG | BODY MASS INDEX: 49.98 KG/M2 | DIASTOLIC BLOOD PRESSURE: 80 MMHG | TEMPERATURE: 99.3 F

## 2025-01-23 DIAGNOSIS — R06.2 WHEEZING: ICD-10-CM

## 2025-01-23 DIAGNOSIS — R09.81 SINUS CONGESTION: ICD-10-CM

## 2025-01-23 DIAGNOSIS — R05.1 ACUTE COUGH: Primary | ICD-10-CM

## 2025-01-23 DIAGNOSIS — R09.81 NASAL CONGESTION: ICD-10-CM

## 2025-01-23 DIAGNOSIS — E04.9 THYROID ENLARGEMENT: ICD-10-CM

## 2025-01-23 PROCEDURE — 3079F DIAST BP 80-89 MM HG: CPT | Performed by: FAMILY MEDICINE

## 2025-01-23 PROCEDURE — 3074F SYST BP LT 130 MM HG: CPT | Performed by: FAMILY MEDICINE

## 2025-01-23 PROCEDURE — 99213 OFFICE O/P EST LOW 20 MIN: CPT | Performed by: FAMILY MEDICINE

## 2025-01-23 RX ORDER — CETIRIZINE HYDROCHLORIDE 10 MG/1
10 TABLET ORAL DAILY
Qty: 30 TABLET | Refills: 1 | Status: SHIPPED | OUTPATIENT
Start: 2025-01-23

## 2025-01-23 RX ORDER — ALBUTEROL SULFATE 90 UG/1
2 INHALANT RESPIRATORY (INHALATION) EVERY 6 HOURS PRN
Qty: 18 G | Refills: 0 | Status: SHIPPED | OUTPATIENT
Start: 2025-01-23

## 2025-01-23 RX ORDER — AZITHROMYCIN 250 MG/1
TABLET, FILM COATED ORAL
Qty: 6 TABLET | Refills: 0 | Status: SHIPPED | OUTPATIENT
Start: 2025-01-23 | End: 2025-02-02

## 2025-01-23 SDOH — ECONOMIC STABILITY: FOOD INSECURITY: WITHIN THE PAST 12 MONTHS, THE FOOD YOU BOUGHT JUST DIDN'T LAST AND YOU DIDN'T HAVE MONEY TO GET MORE.: NEVER TRUE

## 2025-01-23 SDOH — ECONOMIC STABILITY: FOOD INSECURITY: WITHIN THE PAST 12 MONTHS, YOU WORRIED THAT YOUR FOOD WOULD RUN OUT BEFORE YOU GOT MONEY TO BUY MORE.: NEVER TRUE

## 2025-01-23 ASSESSMENT — PATIENT HEALTH QUESTIONNAIRE - PHQ9
SUM OF ALL RESPONSES TO PHQ QUESTIONS 1-9: 0
SUM OF ALL RESPONSES TO PHQ9 QUESTIONS 1 & 2: 0
SUM OF ALL RESPONSES TO PHQ QUESTIONS 1-9: 0
2. FEELING DOWN, DEPRESSED OR HOPELESS: NOT AT ALL
1. LITTLE INTEREST OR PLEASURE IN DOING THINGS: NOT AT ALL

## 2025-01-23 NOTE — PROGRESS NOTES
Rekha Soto (:  1970) is a 54 y.o. female, Established patient, here for evaluation of the following chief complaint(s):  Congestion (Cough, drainage in throat, sinus pain, headache. Dizziness. Runny nose)         Assessment & Plan  1. Congestion.  Symptoms include nasal congestion, sore throat, dizziness, and eye pain. The dizziness is likely due to fluid accumulation in the eardrum. She will discontinue all current medications and start over-the-counter Zyrtec (cetirizine) at bedtime to help with drainage. An antibiotic will be prescribed to address the sinus issue. She is advised to maintain adequate hydration and regular meal times. For pain management, she can take Advil or Tylenol alternatively. She is also advised to take her time when changing positions to help with dizziness. If symptoms do not improve, she should return for further evaluation.    2. Chest congestion and wheezing.  She will use an albuterol inhaler as needed to help with chest congestion and wheezing. A refill for the albuterol inhaler will be provided.    3. Enlarged thyroid gland.  A previous ultrasound in  showed a small cystic nodule. This will be monitored closely.    Results  Imaging  Ultrasound in  showed a small cystic nodule in the thyroid gland.  1. Acute cough  -     albuterol sulfate HFA (PROVENTIL;VENTOLIN;PROAIR) 108 (90 Base) MCG/ACT inhaler; Inhale 2 puffs into the lungs every 6 hours as needed for Wheezing, Disp-18 g, R-0Normal  2. Wheezing  -     albuterol sulfate HFA (PROVENTIL;VENTOLIN;PROAIR) 108 (90 Base) MCG/ACT inhaler; Inhale 2 puffs into the lungs every 6 hours as needed for Wheezing, Disp-18 g, R-0Normal  3. Nasal congestion  -     cetirizine (ZYRTEC) 10 MG tablet; Take 1 tablet by mouth daily, Disp-30 tablet, R-1Normal  4. Sinus congestion  -     azithromycin (ZITHROMAX) 250 MG tablet; 500mg on day 1 followed by 250mg on days 2 - 5, Disp-6 tablet, R-0Normal  5. Thyroid

## 2025-01-23 NOTE — PROGRESS NOTES
\"Have you been to the ER, urgent care clinic since your last visit?  Hospitalized since your last visit?\"    NO    “Have you seen or consulted any other health care providers outside our system since your last visit?”    NO    Have you had a mammogram?”   NO    No breast cancer screening on file      “Have you had a pap smear?”    NO    Date of last Cervical Cancer screen (HPV or PAP): 10/28/2016

## 2025-01-23 NOTE — PATIENT INSTRUCTIONS
[facility] Housing Resources*    For emergency housing call Housing Hotlines:   Regional Housing Crisis Hotline 938-549-4016 ([facility])    [facility]  [address], [place] [address] [phone]  Helpful Info:  [facility] coordinates with other organizations to utilize supportive services to move homeless individuals from dependency to self-sufficiency.   No same day service. Must call to complete pre-intake screening with staff member. There is a current waitlist for program. Shower and laundry available Monday-Friday.     [facility] [phone]  [address] [address]  Helpful info: Shelters provide food, shelters, resources, prayers for men in the [facility]    WEBSITE: [email or web address]/[place] [facility]  What they offer: Transitional housing for single mothers and pregnant women 18-35.   Website: https://www.[facility].org/need-help/  To apply: click on link above to answer a few questions or call [phone]  [address] [address] [phone]   Helpful Info: Open 24/7, 365 days a year. Emergency shelter for Men, Single Women, and Women with children.  Residents receive 3 meals a day, clothing, toiletries, shower and laundry services, case management, and counseling. Call for bed availability.     [employer] [facility] [employer]  [address] [place] [address]  [phone]  Website: https://[employer].org/  Helpful Info: Referrals are ONLY accepted through the Housing Crisis Line ([phone]). Limited space/beds available.  Their program provides short-term subsidized shelter, clinic and dental services, food pantry and includes intensive case management.        [facility]  [address], [place]

## 2025-01-29 ENCOUNTER — HOSPITAL ENCOUNTER (EMERGENCY)
Facility: HOSPITAL | Age: 55
Discharge: HOME | End: 2025-01-29
Attending: STUDENT IN AN ORGANIZED HEALTH CARE EDUCATION/TRAINING PROGRAM
Payer: MEDICAID

## 2025-01-29 VITALS
HEART RATE: 71 BPM | WEIGHT: 271 LBS | BODY MASS INDEX: 49.87 KG/M2 | RESPIRATION RATE: 19 BRPM | DIASTOLIC BLOOD PRESSURE: 83 MMHG | TEMPERATURE: 97.8 F | HEIGHT: 62 IN | SYSTOLIC BLOOD PRESSURE: 147 MMHG | OXYGEN SATURATION: 100 %

## 2025-01-29 DIAGNOSIS — R30.0 DYSURIA: Primary | ICD-10-CM

## 2025-01-29 LAB

## 2025-01-29 PROCEDURE — 81003 URINALYSIS AUTO W/O SCOPE: CPT

## 2025-01-29 PROCEDURE — 99283 EMERGENCY DEPT VISIT LOW MDM: CPT

## 2025-01-29 ASSESSMENT — LIFESTYLE VARIABLES
HOW OFTEN DO YOU HAVE A DRINK CONTAINING ALCOHOL: MONTHLY OR LESS
HOW MANY STANDARD DRINKS CONTAINING ALCOHOL DO YOU HAVE ON A TYPICAL DAY: PATIENT DOES NOT DRINK

## 2025-01-29 ASSESSMENT — PAIN - FUNCTIONAL ASSESSMENT: PAIN_FUNCTIONAL_ASSESSMENT: NONE - DENIES PAIN

## 2025-01-29 NOTE — ED TRIAGE NOTES
Patient presents to the ed stating that she wants to be tested for a uti. Pt complains of dysuria and an odor present with urination. Pt states that this has been going on for 5 days.

## 2025-01-30 NOTE — ED PROVIDER NOTES
Disclaimer     Please note that this dictation was completed with [employer], the computer voice recognition software.  Quite often unanticipated grammatical, syntax, homophones, and other interpretive errors are inadvertently transcribed by the computer software.  Please disregard these errors.  Please excuse any errors that have escaped final proofreading.      Tj Levine Jr., DO  01/29/25 1920

## 2025-04-29 ENCOUNTER — TELEPHONE (OUTPATIENT)
Facility: CLINIC | Age: 55
End: 2025-04-29

## 2025-04-29 RX ORDER — LISINOPRIL AND HYDROCHLOROTHIAZIDE 20; 25 MG/1; MG/1
1 TABLET ORAL DAILY
Qty: 90 TABLET | Refills: 1 | Status: SHIPPED | OUTPATIENT
Start: 2025-04-29

## 2025-04-29 NOTE — TELEPHONE ENCOUNTER
Patient has not received a TB test at our office. Patient has been notified via [facility]. Closing encounter.

## 2025-04-29 NOTE — TELEPHONE ENCOUNTER
Last OV: 01/23/2025 for acute   Last labs:12/11/2024  Next OV and labs: overdue for routine care (HTN and IFG) appointment ticket sent via my-chart. Once appointment made with forward refill to PCP

## 2025-04-29 NOTE — TELEPHONE ENCOUNTER
Pt made an appointment for 5/20/2025 with Dr Chirinos. Pt is almost out of RX   Requested Prescriptions     Pending Prescriptions Disp Refills    lisinopril-hydroCHLOROthiazide (PRINZIDE;ZESTORETIC) 20-25 MG per tablet [Pharmacy Med Name: LISINOPRIL-HCTZ 20/25MG TABLETS] 90 tablet 0     Sig: TAKE 1 TABLET BY MOUTH DAILY

## 2025-04-29 NOTE — TELEPHONE ENCOUNTER
Incoming call received from Ms. Soto wants to know have she received a ''TB'' test in our office?

## 2025-05-20 ENCOUNTER — TELEPHONE (OUTPATIENT)
Facility: CLINIC | Age: 55
End: 2025-05-20

## 2025-05-20 NOTE — TELEPHONE ENCOUNTER
This pharmacy faxed over request for the following prescriptions to be filled:    Medication requested : Fluconazole 150MG  PCP:  Dr. Chirinos   Pharmacy or Print:  [facility]   Mail order or Local pharmacy [facility]     Scheduled appointment if not seen by current providers in office: [facility]  01/23/2025, F/u 05/20/2025

## 2025-05-20 NOTE — TELEPHONE ENCOUNTER
----- Message from Luz GRIFFITHS sent at 5/20/2025 12:08 PM EDT -----  Regarding: [facility] Appointment Request  [facility] Appointment Request    Patient needs appointment for [facility] Appointment Type: Existing Condition Follow Up.    Patient Requested Dates(s): as soon as possible   Patient Requested Time: morning   Provider Name:    Danii Chirinos MD        Reason for Appointment Request: Established Patient - Available appointments did not meet patient need/ medication refills ,follow up want sooner   --------------------------------------------------------------------------------------------------------------------------    Relationship to Patient: Self     Call Back Information: OK to leave message on voicemail  Preferred Call Back Number: Phone [phone]

## 2025-05-20 NOTE — TELEPHONE ENCOUNTER
Rekha Soto canceled appointment that was scheduled for today 05/202/2025 and re-scheduled to June 11, 2025 at 10:45 am

## 2025-06-11 ENCOUNTER — OFFICE VISIT (OUTPATIENT)
Facility: CLINIC | Age: 55
End: 2025-06-11
Payer: MEDICAID

## 2025-06-11 VITALS
DIASTOLIC BLOOD PRESSURE: 70 MMHG | RESPIRATION RATE: 16 BRPM | OXYGEN SATURATION: 96 % | WEIGHT: 262 LBS | BODY MASS INDEX: 48.21 KG/M2 | HEART RATE: 67 BPM | HEIGHT: 62 IN | SYSTOLIC BLOOD PRESSURE: 118 MMHG | TEMPERATURE: 97.2 F

## 2025-06-11 DIAGNOSIS — E04.9 THYROID ENLARGEMENT: ICD-10-CM

## 2025-06-11 DIAGNOSIS — M54.16 LUMBAR NEURITIS: ICD-10-CM

## 2025-06-11 DIAGNOSIS — I10 ESSENTIAL HYPERTENSION: Primary | ICD-10-CM

## 2025-06-11 DIAGNOSIS — M17.11 UNILATERAL PRIMARY OSTEOARTHRITIS, RIGHT KNEE: ICD-10-CM

## 2025-06-11 DIAGNOSIS — Z13.220 SCREENING FOR HYPERLIPIDEMIA: ICD-10-CM

## 2025-06-11 DIAGNOSIS — M54.50 CHRONIC BILATERAL LOW BACK PAIN, UNSPECIFIED WHETHER SCIATICA PRESENT: ICD-10-CM

## 2025-06-11 DIAGNOSIS — Z12.31 ENCOUNTER FOR SCREENING MAMMOGRAM FOR MALIGNANT NEOPLASM OF BREAST: ICD-10-CM

## 2025-06-11 DIAGNOSIS — J30.2 SEASONAL ALLERGIES: ICD-10-CM

## 2025-06-11 DIAGNOSIS — F41.9 ANXIETY: ICD-10-CM

## 2025-06-11 DIAGNOSIS — M17.12 UNILATERAL PRIMARY OSTEOARTHRITIS, LEFT KNEE: ICD-10-CM

## 2025-06-11 DIAGNOSIS — R19.5 POSITIVE FIT (FECAL IMMUNOCHEMICAL TEST): ICD-10-CM

## 2025-06-11 DIAGNOSIS — N89.8 VAGINAL ITCHING: ICD-10-CM

## 2025-06-11 DIAGNOSIS — R73.01 IMPAIRED FASTING BLOOD SUGAR: ICD-10-CM

## 2025-06-11 DIAGNOSIS — G89.29 CHRONIC BILATERAL LOW BACK PAIN, UNSPECIFIED WHETHER SCIATICA PRESENT: ICD-10-CM

## 2025-06-11 DIAGNOSIS — M47.814 THORACIC SPONDYLOSIS: ICD-10-CM

## 2025-06-11 PROCEDURE — 3074F SYST BP LT 130 MM HG: CPT | Performed by: FAMILY MEDICINE

## 2025-06-11 PROCEDURE — 3078F DIAST BP <80 MM HG: CPT | Performed by: FAMILY MEDICINE

## 2025-06-11 PROCEDURE — 99214 OFFICE O/P EST MOD 30 MIN: CPT | Performed by: FAMILY MEDICINE

## 2025-06-11 RX ORDER — METOPROLOL TARTRATE 100 MG/1
100 TABLET ORAL DAILY
Qty: 180 TABLET | Refills: 1 | Status: SHIPPED | OUTPATIENT
Start: 2025-06-11

## 2025-06-11 RX ORDER — IBUPROFEN 400 MG/1
400 TABLET, FILM COATED ORAL 2 TIMES DAILY PRN
Qty: 60 TABLET | Refills: 0 | Status: SHIPPED | OUTPATIENT
Start: 2025-06-11

## 2025-06-11 RX ORDER — FLUCONAZOLE 150 MG/1
150 TABLET ORAL ONCE
Qty: 1 TABLET | Refills: 0 | Status: SHIPPED | OUTPATIENT
Start: 2025-06-11 | End: 2025-06-11

## 2025-06-11 RX ORDER — MONTELUKAST SODIUM 10 MG/1
10 TABLET ORAL DAILY
Qty: 30 TABLET | Refills: 1 | Status: SHIPPED | OUTPATIENT
Start: 2025-06-11

## 2025-06-11 RX ORDER — LISINOPRIL AND HYDROCHLOROTHIAZIDE 20; 25 MG/1; MG/1
1 TABLET ORAL DAILY
Qty: 90 TABLET | Refills: 1 | Status: SHIPPED | OUTPATIENT
Start: 2025-06-11

## 2025-06-11 RX ORDER — BUSPIRONE HYDROCHLORIDE 5 MG/1
5 TABLET ORAL 2 TIMES DAILY PRN
Qty: 60 TABLET | Refills: 0 | Status: SHIPPED | OUTPATIENT
Start: 2025-06-11

## 2025-06-11 RX ORDER — CHOLECALCIFEROL (VITAMIN D3) 25 MCG
1000 TABLET ORAL DAILY
Qty: 90 TABLET | Refills: 5 | Status: SHIPPED | OUTPATIENT
Start: 2025-06-11

## 2025-06-11 RX ORDER — CLOTRIMAZOLE AND BETAMETHASONE DIPROPIONATE 10; .64 MG/G; MG/G
CREAM TOPICAL 2 TIMES DAILY
Qty: 45 G | Refills: 0 | Status: SHIPPED | OUTPATIENT
Start: 2025-06-11

## 2025-06-11 RX ORDER — CETIRIZINE HYDROCHLORIDE 10 MG/1
10 TABLET ORAL DAILY
Qty: 30 TABLET | Refills: 1 | Status: SHIPPED | OUTPATIENT
Start: 2025-06-11

## 2025-06-11 RX ORDER — ALBUTEROL SULFATE 90 UG/1
2 INHALANT RESPIRATORY (INHALATION) EVERY 6 HOURS PRN
Qty: 18 G | Refills: 0 | Status: SHIPPED | OUTPATIENT
Start: 2025-06-11

## 2025-06-11 RX ORDER — FLUTICASONE PROPIONATE 50 MCG
2 SPRAY, SUSPENSION (ML) NASAL DAILY
Qty: 16 G | Refills: 0 | Status: SHIPPED | OUTPATIENT
Start: 2025-06-11

## 2025-06-11 SDOH — ECONOMIC STABILITY: FOOD INSECURITY: WITHIN THE PAST 12 MONTHS, YOU WORRIED THAT YOUR FOOD WOULD RUN OUT BEFORE YOU GOT MONEY TO BUY MORE.: NEVER TRUE

## 2025-06-11 SDOH — ECONOMIC STABILITY: FOOD INSECURITY: WITHIN THE PAST 12 MONTHS, THE FOOD YOU BOUGHT JUST DIDN'T LAST AND YOU DIDN'T HAVE MONEY TO GET MORE.: NEVER TRUE

## 2025-06-11 ASSESSMENT — PATIENT HEALTH QUESTIONNAIRE - PHQ9
SUM OF ALL RESPONSES TO PHQ QUESTIONS 1-9: 0
2. FEELING DOWN, DEPRESSED OR HOPELESS: NOT AT ALL
SUM OF ALL RESPONSES TO PHQ QUESTIONS 1-9: 0
1. LITTLE INTEREST OR PLEASURE IN DOING THINGS: NOT AT ALL

## 2025-06-11 NOTE — PROGRESS NOTES
Have you been to the ER, urgent care clinic since your last visit?  Hospitalized since your last visit?   [facility] LOV: 1/29/25    Have you seen or consulted any other health care providers outside our system since your last visit?   NO    Have you had a mammogram?”   NO    No breast cancer screening on file      “Have you had a pap smear?”    4/01/2022 at [facility], per GYN Note from Fiona Paiz NP.    Date of last Cervical Cancer screen (HPV or PAP): 10/28/2016       “Have you had a colorectal cancer screening such as a colonoscopy/FIT/Cologuard?    NO    No colonoscopy on file  No cologuard on file  Date of last FIT: 5/8/2024   No flexible sigmoidoscopy on file          
thyroid gland  Respiratory: Clear to auscultation, no wheezing, rales or rhonchi  Cardiovascular: Regular rate and rhythm, no murmurs, rubs, or gallops  Gastrointestinal: Soft, no tenderness, no distention, no masses  Extremities: No edema, no cyanosis           The patient (or guardian, if applicable) and other individuals in attendance with the patient were advised that Artificial Intelligence will be utilized during this visit to record, process the conversation to generate a clinical note and to support improvement of the AI technology. The patient (or guardian, if applicable) and other individuals in attendance at the appointment consented to the use of AI, including the recording.      An electronic signature was used to authenticate this note.    --Danii Chirinos MD

## 2025-07-12 ENCOUNTER — HOSPITAL ENCOUNTER (OUTPATIENT)
Facility: HOSPITAL | Age: 55
Discharge: HOME | End: 2025-07-15
Attending: FAMILY MEDICINE
Payer: MEDICAID

## 2025-07-12 VITALS — HEIGHT: 62 IN | BODY MASS INDEX: 49.5 KG/M2 | WEIGHT: 269 LBS

## 2025-07-12 DIAGNOSIS — Z12.31 ENCOUNTER FOR SCREENING MAMMOGRAM FOR MALIGNANT NEOPLASM OF BREAST: ICD-10-CM

## 2025-07-12 PROCEDURE — 77063 BREAST TOMOSYNTHESIS BI: CPT

## 2025-07-25 ENCOUNTER — TELEPHONE (OUTPATIENT)
Age: 55
End: 2025-07-25

## 2025-07-25 RX ORDER — FLUTICASONE PROPIONATE 50 MCG
2 SPRAY, SUSPENSION (ML) NASAL DAILY
Qty: 16 G | Refills: 0 | Status: SHIPPED | OUTPATIENT
Start: 2025-07-25

## 2025-08-13 ENCOUNTER — OFFICE VISIT (OUTPATIENT)
Age: 55
End: 2025-08-13
Payer: MEDICAID

## 2025-08-13 VITALS
TEMPERATURE: 98 F | HEART RATE: 72 BPM | WEIGHT: 269 LBS | BODY MASS INDEX: 49.5 KG/M2 | OXYGEN SATURATION: 98 % | HEIGHT: 62 IN

## 2025-08-13 DIAGNOSIS — M41.25 OTHER IDIOPATHIC SCOLIOSIS, THORACOLUMBAR REGION: ICD-10-CM

## 2025-08-13 DIAGNOSIS — M51.362 DEGENERATION OF INTERVERTEBRAL DISC OF LUMBAR REGION WITH DISCOGENIC BACK PAIN AND LOWER EXTREMITY PAIN: ICD-10-CM

## 2025-08-13 DIAGNOSIS — M54.50 LUMBAR PAIN: Primary | ICD-10-CM

## 2025-08-13 DIAGNOSIS — M43.16 ANTEROLISTHESIS OF LUMBAR SPINE: ICD-10-CM

## 2025-08-13 PROCEDURE — 99214 OFFICE O/P EST MOD 30 MIN: CPT | Performed by: PHYSICAL MEDICINE & REHABILITATION

## 2025-08-13 PROCEDURE — 72110 X-RAY EXAM L-2 SPINE 4/>VWS: CPT | Performed by: PHYSICAL MEDICINE & REHABILITATION

## 2025-08-13 RX ORDER — CYCLOBENZAPRINE HCL 10 MG
10 TABLET ORAL 2 TIMES DAILY PRN
Qty: 40 TABLET | Refills: 0 | Status: SHIPPED | OUTPATIENT
Start: 2025-08-13 | End: 2025-09-02

## 2025-08-13 RX ORDER — IBUPROFEN 800 MG/1
800 TABLET, FILM COATED ORAL 3 TIMES DAILY
Qty: 90 TABLET | Refills: 0 | Status: SHIPPED | OUTPATIENT
Start: 2025-08-13

## 2025-08-13 RX ORDER — ALBUTEROL SULFATE 90 UG/1
INHALANT RESPIRATORY (INHALATION)
Qty: 8.5 G | Refills: 1 | Status: SHIPPED | OUTPATIENT
Start: 2025-08-13

## 2025-08-18 ENCOUNTER — TELEPHONE (OUTPATIENT)
Age: 55
End: 2025-08-18

## 2025-08-25 ENCOUNTER — TELEPHONE (OUTPATIENT)
Age: 55
End: 2025-08-25